# Patient Record
Sex: FEMALE | Race: WHITE | Employment: OTHER | URBAN - METROPOLITAN AREA
[De-identification: names, ages, dates, MRNs, and addresses within clinical notes are randomized per-mention and may not be internally consistent; named-entity substitution may affect disease eponyms.]

---

## 2019-03-21 ENCOUNTER — OFFICE VISIT (OUTPATIENT)
Dept: VASCULAR SURGERY | Age: 64
End: 2019-03-21
Payer: MEDICARE

## 2019-03-21 VITALS — HEART RATE: 88 BPM | SYSTOLIC BLOOD PRESSURE: 122 MMHG | DIASTOLIC BLOOD PRESSURE: 82 MMHG

## 2019-03-21 DIAGNOSIS — R09.89 DECREASED DORSALIS PEDIS PULSE: ICD-10-CM

## 2019-03-21 DIAGNOSIS — M79.89 LEG SWELLING: Primary | ICD-10-CM

## 2019-03-21 DIAGNOSIS — L81.9 DISCOLORATION OF SKIN OF LOWER LEG: ICD-10-CM

## 2019-03-21 DIAGNOSIS — F17.200 TOBACCO DEPENDENCE: ICD-10-CM

## 2019-03-21 DIAGNOSIS — I87.2 CHRONIC VENOUS INSUFFICIENCY: ICD-10-CM

## 2019-03-21 PROCEDURE — 3017F COLORECTAL CA SCREEN DOC REV: CPT | Performed by: SURGERY

## 2019-03-21 PROCEDURE — G8421 BMI NOT CALCULATED: HCPCS | Performed by: SURGERY

## 2019-03-21 PROCEDURE — 99204 OFFICE O/P NEW MOD 45 MIN: CPT | Performed by: SURGERY

## 2019-03-21 PROCEDURE — 4004F PT TOBACCO SCREEN RCVD TLK: CPT | Performed by: SURGERY

## 2019-03-21 PROCEDURE — G8427 DOCREV CUR MEDS BY ELIG CLIN: HCPCS | Performed by: SURGERY

## 2019-03-21 PROCEDURE — G8484 FLU IMMUNIZE NO ADMIN: HCPCS | Performed by: SURGERY

## 2019-03-26 ENCOUNTER — TELEPHONE (OUTPATIENT)
Dept: VASCULAR SURGERY | Age: 64
End: 2019-03-26

## 2019-07-10 ENCOUNTER — HOSPITAL ENCOUNTER (OUTPATIENT)
Dept: GENERAL RADIOLOGY | Age: 64
Discharge: HOME | End: 2019-07-12
Payer: MEDICARE

## 2019-07-10 ENCOUNTER — HOSPITAL ENCOUNTER (OUTPATIENT)
Age: 64
Discharge: HOME | End: 2019-07-12
Payer: MEDICARE

## 2019-07-10 DIAGNOSIS — M25.552 HIP PAIN, CHRONIC, LEFT: ICD-10-CM

## 2019-07-10 DIAGNOSIS — G89.29 HIP PAIN, CHRONIC, LEFT: ICD-10-CM

## 2019-07-10 PROCEDURE — 73502 X-RAY EXAM HIP UNI 2-3 VIEWS: CPT

## 2021-05-27 ENCOUNTER — TELEPHONE (OUTPATIENT)
Dept: CASE MANAGEMENT | Age: 66
End: 2021-05-27

## 2021-05-27 NOTE — TELEPHONE ENCOUNTER
I called the patient and I was unable to leave a message to remind her of the CT lung screening at [facility] on 5/28/2021 at 8:00 am with an 7:30 am arrival.  If unable to keep this appt call the office at [phone] to get rescheduled.           Electronically signed by Santos Starr on 5/27/21 at 11:00 AM EDT

## 2021-05-28 ENCOUNTER — HOSPITAL ENCOUNTER (OUTPATIENT)
Dept: CT IMAGING | Age: 66
Discharge: HOME | End: 2021-05-30
Payer: MEDICARE

## 2021-05-28 ENCOUNTER — HOSPITAL ENCOUNTER (OUTPATIENT)
Age: 66
Discharge: HOME | End: 2021-05-28
Payer: MEDICARE

## 2021-05-28 ENCOUNTER — HOSPITAL ENCOUNTER (OUTPATIENT)
Dept: MAMMOGRAPHY | Age: 66
Discharge: HOME | End: 2021-05-30
Payer: MEDICARE

## 2021-05-28 DIAGNOSIS — Z11.4 ENCOUNTER FOR SCREENING FOR HIV: ICD-10-CM

## 2021-05-28 DIAGNOSIS — E78.5 ELEVATED LIPIDS: ICD-10-CM

## 2021-05-28 DIAGNOSIS — G60.3 IDIOPATHIC PROGRESSIVE NEUROPATHY: ICD-10-CM

## 2021-05-28 DIAGNOSIS — R73.9 ELEVATED BLOOD SUGAR: ICD-10-CM

## 2021-05-28 DIAGNOSIS — Z11.59 ENCOUNTER FOR HEPATITIS C SCREENING TEST FOR LOW RISK PATIENT: ICD-10-CM

## 2021-05-28 DIAGNOSIS — E44.0 MODERATE PROTEIN-CALORIE MALNUTRITION (HCC): ICD-10-CM

## 2021-05-28 DIAGNOSIS — F17.200 TOBACCO DEPENDENCE: ICD-10-CM

## 2021-05-28 DIAGNOSIS — Z12.31 ENCOUNTER FOR SCREENING MAMMOGRAM FOR MALIGNANT NEOPLASM OF BREAST: ICD-10-CM

## 2021-05-28 LAB
ALBUMIN SERPL-MCNC: 4.6 G/DL (ref 3.5–5.2)
ALP BLD-CCNC: 47 U/L (ref 35–104)
ALT SERPL-CCNC: 8 U/L (ref 0–32)
ANION GAP SERPL CALCULATED.3IONS-SCNC: 10 MMOL/L (ref 7–16)
AST SERPL-CCNC: 10 U/L (ref 0–31)
BILIRUB SERPL-MCNC: 0.3 MG/DL (ref 0–1.2)
BUN BLDV-MCNC: 9 MG/DL (ref 6–23)
CALCIUM SERPL-MCNC: 9.6 MG/DL (ref 8.6–10.2)
CHLORIDE BLD-SCNC: 100 MMOL/L (ref 98–107)
CHOLESTEROL, TOTAL: 216 MG/DL (ref 0–199)
CO2: 29 MMOL/L (ref 22–29)
CREAT SERPL-MCNC: 0.6 MG/DL (ref 0.5–1)
FOLATE: 6.3 NG/ML (ref 4.8–24.2)
GFR AFRICAN AMERICAN: >60
GFR NON-AFRICAN AMERICAN: >60 ML/MIN/1.73
GLUCOSE BLD-MCNC: 94 MG/DL (ref 74–99)
HBA1C MFR BLD: 6.5 % (ref 4–5.6)
HCT VFR BLD CALC: 52.3 % (ref 34–48)
HDLC SERPL-MCNC: 36 MG/DL
HEMOGLOBIN: 17.4 G/DL (ref 11.5–15.5)
LDL CHOLESTEROL CALCULATED: 148 MG/DL (ref 0–99)
MCH RBC QN AUTO: 30.2 PG (ref 26–35)
MCHC RBC AUTO-ENTMCNC: 33.3 % (ref 32–34.5)
MCV RBC AUTO: 90.8 FL (ref 80–99.9)
PDW BLD-RTO: 14.5 FL (ref 11.5–15)
PLATELET # BLD: 290 E9/L (ref 130–450)
PMV BLD AUTO: 9.9 FL (ref 7–12)
POTASSIUM SERPL-SCNC: 3.7 MMOL/L (ref 3.5–5)
RBC # BLD: 5.76 E12/L (ref 3.5–5.5)
SODIUM BLD-SCNC: 139 MMOL/L (ref 132–146)
TRIGL SERPL-MCNC: 160 MG/DL (ref 0–149)
TSH SERPL DL<=0.05 MIU/L-ACNC: 1.64 UIU/ML (ref 0.27–4.2)
VITAMIN B-12: 540 PG/ML (ref 211–946)
VLDLC SERPL CALC-MCNC: 32 MG/DL
WBC # BLD: 9.4 E9/L (ref 4.5–11.5)

## 2021-05-28 PROCEDURE — 80053 COMPREHEN METABOLIC PANEL: CPT

## 2021-05-28 PROCEDURE — 87522 HEPATITIS C REVRS TRNSCRPJ: CPT

## 2021-05-28 PROCEDURE — 86703 HIV-1/HIV-2 1 RESULT ANTBDY: CPT

## 2021-05-28 PROCEDURE — 85027 COMPLETE CBC AUTOMATED: CPT

## 2021-05-28 PROCEDURE — 71271 CT THORAX LUNG CANCER SCR C-: CPT

## 2021-05-28 PROCEDURE — 84165 PROTEIN E-PHORESIS SERUM: CPT

## 2021-05-28 PROCEDURE — 83036 HEMOGLOBIN GLYCOSYLATED A1C: CPT

## 2021-05-28 PROCEDURE — 80061 LIPID PANEL: CPT

## 2021-05-28 PROCEDURE — 86803 HEPATITIS C AB TEST: CPT

## 2021-05-28 PROCEDURE — 82607 VITAMIN B-12: CPT

## 2021-05-28 PROCEDURE — 84443 ASSAY THYROID STIM HORMONE: CPT

## 2021-05-28 PROCEDURE — 77067 SCR MAMMO BI INCL CAD: CPT

## 2021-05-28 PROCEDURE — 36415 COLL VENOUS BLD VENIPUNCTURE: CPT

## 2021-05-28 PROCEDURE — 82746 ASSAY OF FOLIC ACID SERUM: CPT

## 2021-06-01 ENCOUNTER — TELEPHONE (OUTPATIENT)
Dept: CASE MANAGEMENT | Age: 66
End: 2021-06-01

## 2021-06-01 LAB
ALBUMIN SERPL-MCNC: 3.6 G/DL (ref 3.5–4.7)
ALPHA-1-GLOBULIN: 0.3 G/DL (ref 0.2–0.4)
ALPHA-2-GLOBULIN: 1 G/FL (ref 0.5–1)
BETA GLOBULIN: 1.1 G/DL (ref 0.8–1.3)
ELECTROPHORESIS: NORMAL
GAMMA GLOBULIN: 1.4 G/DL (ref 0.7–1.6)
HEPATITIS C ANTIBODY INTERPRETATION: NORMAL
HIV-1 AND HIV-2 ANTIBODIES: NORMAL
TOTAL PROTEIN: 7.4 G/DL (ref 6.4–8.3)

## 2021-06-01 NOTE — TELEPHONE ENCOUNTER
No call, encounter opened to process CT Lung Screening. CT Lung Screen: 5/28/2021    Impression   Multiple 2-4 mm bilateral pulmonary nodules predominantly in a subpleural   distribution.       Mosaic attenuation pattern throughout the mid and upper lung zones, a   nonspecific finding generally seen with chronic small vessel or small airways   disease.       2.9 cm fluid density collection in the anterior mediastinum, adjacent to the   upper margin of the pericardial recess.  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  No obvious solid components are appreciated.       Multiple top-normal mediastinal lymph nodes, of uncertain etiology or   clinical significance.       LUNG RADS:   Per ACR Lung-RADS Version 1.1       Category 2S, Benign appearance or behavior.       Management:  Continue annual lung screening with LDCT in 12 months.       Findings:  Modifier-may add on to category 0-4 coding.       Management:  As appropriate to specific finding.       RECOMMENDATIONS:   If you would like to register your patient with the [email or web address], please contact the Nurse Navigator at   [phone]. Pack years: 13    Social History     Tobacco Use  Smoking Status: Current Every Day Smoker    Start Date:    Quit Date:    Types: Cigarettes   Packs/Day: 0.5   Years: 30   Pack Years: 15   Smokeless Tobacco: Never used         Results letter sent to patient via my chart or mailed.      [facility]

## 2021-06-02 LAB
HCV QNT BY NAAT IU/ML: NOT DETECTED IU/ML
HCV QNT BY NAAT LOG IU/ML: NOT DETECTED LOG IU/ML
INTERPRETATION: NOT DETECTED

## 2021-06-07 ENCOUNTER — TELEPHONE (OUTPATIENT)
Dept: GENERAL RADIOLOGY | Age: 66
End: 2021-06-07

## 2021-06-07 NOTE — TELEPHONE ENCOUNTER
Call to patient in reference to her mammogram performed at [facility] on May 28, 2021. Instructed patient that the radiologist has recommended some additional breast imaging, in order to make a final determination/result. Verbalizes understanding and is agreeable to proceed. Patient wanted scheduled in 2 weeks as she is in the process of moving. Patient scheduled 6-21.       Em Blackman RN, BSN, [address]

## 2021-07-19 ENCOUNTER — TELEPHONE (OUTPATIENT)
Dept: GENERAL RADIOLOGY | Age: 66
End: 2021-07-19

## 2021-07-19 NOTE — TELEPHONE ENCOUNTER
Spoke with patient about rescheduling breast imaging appointment. Appointment made for 8-9-21.   Patient states she isn't available until the second week of August.

## 2021-10-05 ENCOUNTER — HOSPITAL ENCOUNTER (OUTPATIENT)
Dept: GENERAL RADIOLOGY | Age: 66
Discharge: HOME | End: 2021-10-07
Payer: MEDICARE

## 2021-10-05 ENCOUNTER — HOSPITAL ENCOUNTER (OUTPATIENT)
Dept: GENERAL RADIOLOGY | Age: 66
End: 2021-10-05
Payer: MEDICARE

## 2021-10-05 DIAGNOSIS — R92.8 ABNORMAL MAMMOGRAM: ICD-10-CM

## 2021-10-05 PROCEDURE — G0279 TOMOSYNTHESIS, MAMMO: HCPCS

## 2021-10-05 PROCEDURE — 76642 ULTRASOUND BREAST LIMITED: CPT

## 2021-10-05 PROCEDURE — 77065 DX MAMMO INCL CAD UNI: CPT

## 2022-03-01 ENCOUNTER — HOSPITAL ENCOUNTER (OUTPATIENT)
Dept: NEUROLOGY | Age: 67
Discharge: HOME | End: 2022-03-01
Payer: MEDICARE

## 2022-03-01 VITALS — BODY MASS INDEX: 18.42 KG/M2 | HEIGHT: 72 IN | WEIGHT: 136 LBS

## 2022-03-01 PROCEDURE — 95886 MUSC TEST DONE W/N TEST COMP: CPT

## 2022-03-01 PROCEDURE — 95911 NRV CNDJ TEST 9-10 STUDIES: CPT

## 2022-03-01 NOTE — PROCEDURES
[address] [facility]  [address] [facility], [address] [facility]  Phone: [phone]  Fax: [phone]      Referring Provider: FREDDY Hall CNP  Primary Care Physician: Amarilis Alvarez DO  Patient Name: Christi Onofre  Patient YOB: 1955  Gender: female  BMI: Body mass index is 18.44 kg/m². Height 6' (1.829 m), weight 136 lb (61.7 kg). 3/1/2022    Reason for referral: EMG    Description of clinical problem:   No chief complaint on file. Pain Yes   ; Numbness/tingling  Yes; Weakness  Yes       Brief physical exam:   Sensory deficit Yes; Weakness Yes; Atrophy  Yes; Reflex abnormality Yes      Study Limitations:  none    Summary of Findings:   Nerve conduction studies:   · The following nerve conduction studies were abnormal:   · Reduced peroneal motor amplitudes and absent sural/superficial sensory responses bilaterally. · All other nerve conduction studies listed in the table above were normal in latency, amplitude and conduction velocity. Lauren  22.  Electrodiagnostic Laboratory  [place]        Full Name: Harry Neal Gender: Female  MRN: [id number] YOB: 1955  Location[de-identified] Outpt. Visit Date: 3/1/2022 10:45  Age: 73 Years 11 Months Old  Examining Physician: Dr. Trav Garcia  Referring Physician: Steve Brenner. Esdras Caicedo NP  Technician: Yolanda Morocho   Height: 6 feet 0 inch  Weight: 136 lbs  Notes: Neuropathy G90.09      Motor NCS      Nerve / Sites Lat. Amplitude Amp. 1-2 Distance Lat Diff Velocity Temp.    ms mV % cm ms m/s °C   L Peroneal - EDB      Ankle 4.32 1.4 100 8   33.6      Pop fossa 15.10 0.9 63.3 39 10.78 36 33.8   R Peroneal - EDB      Ankle 5.21 2.7 100 8   33.5      Pop fossa 15.68 2.6 97.8 39 10.47 37 33.4   R Tibial - AH      Ankle 3.96 13.6 100 8   32      Pop fossa 15.99 9.1 67.3 43 12.03 36 32.6   L Tibial - AH      Ankle 4.69 6.8 100 8   32.5      Pop fossa 16.98 3.6 52.7 43 12.29 35 32.5 Sensory NCS      Nerve / Sites Onset Lat Peak Lat PP Amp Distance Velocity Temp.    ms ms µV cm m/s °C   L Superficial peroneal - Ankle      Lat leg NR NR NR 10 NR 33.6   R Superficial peroneal - Ankle      Lat leg NR NR NR 10 NR 32.7   R Sural - Ankle (Calf)      Calf NR NR NR 14 NR 32.9   L Sural - Ankle (Calf)      Calf NR NR NR 14 NR 32.6       F  Wave      Nerve F Lat M Lat F-M Lat    ms ms ms   L Peroneal - EDB 60.6 5.2 55.4   R Peroneal - EDB 61.4 6.6 54.8   R Tibial - AH 64.9 1.7 63.2   L Tibial - AH 66.6 5.6 61.0       H Reflex      Nerve Lat Hmax    ms   R Tibial - [place]us 34.6   L Tibial - [place]ole[place] 36.6       EMG         EMG Summary Table     Spontaneous MUAP Recruitment   Muscle IA Fib PSW Fasc H.F. Amp Dur. PPP Pattern   L. Extensor digitorum brevis N None 1+ None None N N N N   L. Gastrocnemius (Lateral head) N None None None None N N N N   L. Gastrocnemius (Medial head) N None None None None N N N N   L. Tibialis anterior N None None None None N N N N   L. Vastus lateralis N None None None None N N N N                 ·     Needle EMG:   · Needle EMG was performed using a monopolar needle. · The following abnormalities were seen on needle EMG: none   All other muscles tested, as listed in the table above demonstrated normal amplitude, duration, phases and recruitment and no active denervation signs were seen. 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 Clinical correlation advised. Technologist: Dana Thompson MD    Nerve conduction studies and electromyography were performed according to our laboratory policies and procedures which can be provided upon request. All abnormal values are identified in the table.  Laboratory normal values can also be provided upon request.       Cc: Josh Miller, APRN - CNP  Natalie Rodriguez DO

## 2022-08-03 ENCOUNTER — HOSPITAL ENCOUNTER (INPATIENT)
Age: 67
LOS: 6 days | Discharge: HOME HEALTH | DRG: 504 | End: 2022-08-10
Attending: EMERGENCY MEDICINE | Admitting: FAMILY MEDICINE
Payer: MEDICARE

## 2022-08-03 ENCOUNTER — APPOINTMENT (OUTPATIENT)
Dept: GENERAL RADIOLOGY | Age: 67
DRG: 504 | End: 2022-08-03
Payer: MEDICARE

## 2022-08-03 DIAGNOSIS — M86.172 ACUTE OSTEOMYELITIS OF TOE OF LEFT FOOT (HCC): Primary | ICD-10-CM

## 2022-08-03 DIAGNOSIS — L03.032 CELLULITIS OF LEFT TOE: ICD-10-CM

## 2022-08-03 DIAGNOSIS — M86.9 OSTEOMYELITIS OF LEFT FOOT, UNSPECIFIED TYPE (HCC): ICD-10-CM

## 2022-08-03 LAB
ALBUMIN SERPL-MCNC: 4.1 G/DL (ref 3.5–5.2)
ALP BLD-CCNC: 46 U/L (ref 35–104)
ALT SERPL-CCNC: 7 U/L (ref 0–32)
ANION GAP SERPL CALCULATED.3IONS-SCNC: 14 MMOL/L (ref 7–16)
AST SERPL-CCNC: 11 U/L (ref 0–31)
BASOPHILS ABSOLUTE: 0.06 E9/L (ref 0–0.2)
BASOPHILS RELATIVE PERCENT: 0.6 % (ref 0–2)
BILIRUB SERPL-MCNC: 0.3 MG/DL (ref 0–1.2)
BUN BLDV-MCNC: 9 MG/DL (ref 6–23)
CALCIUM SERPL-MCNC: 9.8 MG/DL (ref 8.6–10.2)
CHLORIDE BLD-SCNC: 98 MMOL/L (ref 98–107)
CO2: 27 MMOL/L (ref 22–29)
CREAT SERPL-MCNC: 0.6 MG/DL (ref 0.5–1)
EOSINOPHILS ABSOLUTE: 0.12 E9/L (ref 0.05–0.5)
EOSINOPHILS RELATIVE PERCENT: 1.1 % (ref 0–6)
GFR AFRICAN AMERICAN: >60
GFR NON-AFRICAN AMERICAN: >60 ML/MIN/1.73
GLUCOSE BLD-MCNC: 105 MG/DL (ref 74–99)
HCT VFR BLD CALC: 47.8 % (ref 34–48)
HEMOGLOBIN: 16.7 G/DL (ref 11.5–15.5)
IMMATURE GRANULOCYTES #: 0.02 E9/L
IMMATURE GRANULOCYTES %: 0.2 % (ref 0–5)
LACTIC ACID: 1.1 MMOL/L (ref 0.5–2.2)
LYMPHOCYTES ABSOLUTE: 3.12 E9/L (ref 1.5–4)
LYMPHOCYTES RELATIVE PERCENT: 28.9 % (ref 20–42)
MCH RBC QN AUTO: 31.2 PG (ref 26–35)
MCHC RBC AUTO-ENTMCNC: 34.9 % (ref 32–34.5)
MCV RBC AUTO: 89.2 FL (ref 80–99.9)
MONOCYTES ABSOLUTE: 0.66 E9/L (ref 0.1–0.95)
MONOCYTES RELATIVE PERCENT: 6.1 % (ref 2–12)
NEUTROPHILS ABSOLUTE: 6.8 E9/L (ref 1.8–7.3)
NEUTROPHILS RELATIVE PERCENT: 63.1 % (ref 43–80)
PDW BLD-RTO: 13 FL (ref 11.5–15)
PLATELET # BLD: 288 E9/L (ref 130–450)
PMV BLD AUTO: 9.9 FL (ref 7–12)
POTASSIUM SERPL-SCNC: 3.4 MMOL/L (ref 3.5–5)
RBC # BLD: 5.36 E12/L (ref 3.5–5.5)
SODIUM BLD-SCNC: 139 MMOL/L (ref 132–146)
TOTAL PROTEIN: 8.3 G/DL (ref 6.4–8.3)
WBC # BLD: 10.8 E9/L (ref 4.5–11.5)

## 2022-08-03 PROCEDURE — 87070 CULTURE OTHR SPECIMN AEROBIC: CPT

## 2022-08-03 PROCEDURE — 99285 EMERGENCY DEPT VISIT HI MDM: CPT

## 2022-08-03 PROCEDURE — 96366 THER/PROPH/DIAG IV INF ADDON: CPT

## 2022-08-03 PROCEDURE — 96375 TX/PRO/DX INJ NEW DRUG ADDON: CPT

## 2022-08-03 PROCEDURE — 6360000002 HC RX W HCPCS: Performed by: EMERGENCY MEDICINE

## 2022-08-03 PROCEDURE — 87186 SC STD MICRODIL/AGAR DIL: CPT

## 2022-08-03 PROCEDURE — 96365 THER/PROPH/DIAG IV INF INIT: CPT

## 2022-08-03 PROCEDURE — 6370000000 HC RX 637 (ALT 250 FOR IP): Performed by: EMERGENCY MEDICINE

## 2022-08-03 PROCEDURE — 73660 X-RAY EXAM OF TOE(S): CPT

## 2022-08-03 PROCEDURE — 36415 COLL VENOUS BLD VENIPUNCTURE: CPT

## 2022-08-03 PROCEDURE — 2580000003 HC RX 258: Performed by: EMERGENCY MEDICINE

## 2022-08-03 PROCEDURE — 85025 COMPLETE CBC W/AUTO DIFF WBC: CPT

## 2022-08-03 PROCEDURE — 87077 CULTURE AEROBIC IDENTIFY: CPT

## 2022-08-03 PROCEDURE — 87040 BLOOD CULTURE FOR BACTERIA: CPT

## 2022-08-03 PROCEDURE — 6370000000 HC RX 637 (ALT 250 FOR IP)

## 2022-08-03 PROCEDURE — 96367 TX/PROPH/DG ADDL SEQ IV INF: CPT

## 2022-08-03 PROCEDURE — 80053 COMPREHEN METABOLIC PANEL: CPT

## 2022-08-03 PROCEDURE — 83605 ASSAY OF LACTIC ACID: CPT

## 2022-08-03 RX ORDER — HYDROCODONE BITARTRATE AND ACETAMINOPHEN 5; 325 MG/1; MG/1
1 TABLET ORAL ONCE
Status: COMPLETED | OUTPATIENT
Start: 2022-08-03 | End: 2022-08-03

## 2022-08-03 RX ORDER — FENTANYL CITRATE 50 UG/ML
50 INJECTION, SOLUTION INTRAMUSCULAR; INTRAVENOUS ONCE
Status: COMPLETED | OUTPATIENT
Start: 2022-08-03 | End: 2022-08-03

## 2022-08-03 RX ORDER — 0.9 % SODIUM CHLORIDE 0.9 %
500 INTRAVENOUS SOLUTION INTRAVENOUS ONCE
Status: COMPLETED | OUTPATIENT
Start: 2022-08-03 | End: 2022-08-03

## 2022-08-03 RX ORDER — DIAPER,BRIEF,INFANT-TODD,DISP
EACH MISCELLANEOUS
Status: COMPLETED
Start: 2022-08-03 | End: 2022-08-03

## 2022-08-03 RX ADMIN — SODIUM CHLORIDE 500 ML: 9 INJECTION, SOLUTION INTRAVENOUS at 19:07

## 2022-08-03 RX ADMIN — VANCOMYCIN HYDROCHLORIDE 1000 MG: 1 INJECTION, POWDER, LYOPHILIZED, FOR SOLUTION INTRAVENOUS at 22:26

## 2022-08-03 RX ADMIN — FENTANYL CITRATE 50 MCG: 50 INJECTION, SOLUTION INTRAMUSCULAR; INTRAVENOUS at 23:04

## 2022-08-03 RX ADMIN — PIPERACILLIN AND TAZOBACTAM 3375 MG: 3; .375 INJECTION, POWDER, FOR SOLUTION INTRAVENOUS at 20:19

## 2022-08-03 RX ADMIN — HYDROCODONE BITARTRATE AND ACETAMINOPHEN 1 TABLET: 5; 325 TABLET ORAL at 18:49

## 2022-08-03 RX ADMIN — BACITRACIN ZINC 1 G: 500 OINTMENT TOPICAL at 22:34

## 2022-08-03 ASSESSMENT — PAIN SCALES - GENERAL
PAINLEVEL_OUTOF10: 8
PAINLEVEL_OUTOF10: 7

## 2022-08-03 ASSESSMENT — PAIN - FUNCTIONAL ASSESSMENT: PAIN_FUNCTIONAL_ASSESSMENT: NONE - DENIES PAIN
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
## 2022-08-03 NOTE — ED NOTES
Pt has skin ulcer the size of quarter and reddend swollen areas right foot and on top and bottom of area     Joyce Rodriguez RN  08/03/22 1925

## 2022-08-04 ENCOUNTER — APPOINTMENT (OUTPATIENT)
Dept: INTERVENTIONAL RADIOLOGY/VASCULAR | Age: 67
DRG: 504 | End: 2022-08-04
Payer: MEDICARE

## 2022-08-04 ENCOUNTER — ANESTHESIA EVENT (OUTPATIENT)
Dept: OPERATING ROOM | Age: 67
DRG: 504 | End: 2022-08-04
Payer: MEDICARE

## 2022-08-04 ENCOUNTER — APPOINTMENT (OUTPATIENT)
Dept: ULTRASOUND IMAGING | Age: 67
DRG: 504 | End: 2022-08-04
Payer: MEDICARE

## 2022-08-04 PROBLEM — M86.9 OSTEOMYELITIS (HCC): Status: ACTIVE | Noted: 2022-08-04

## 2022-08-04 LAB
ALBUMIN SERPL-MCNC: 3.6 G/DL (ref 3.5–5.2)
ALP BLD-CCNC: 42 U/L (ref 35–104)
ALT SERPL-CCNC: 9 U/L (ref 0–32)
ANION GAP SERPL CALCULATED.3IONS-SCNC: 9 MMOL/L (ref 7–16)
AST SERPL-CCNC: 11 U/L (ref 0–31)
BASOPHILS ABSOLUTE: 0.06 E9/L (ref 0–0.2)
BASOPHILS RELATIVE PERCENT: 0.6 % (ref 0–2)
BILIRUB SERPL-MCNC: 0.3 MG/DL (ref 0–1.2)
BUN BLDV-MCNC: 7 MG/DL (ref 6–23)
CALCIUM SERPL-MCNC: 9.3 MG/DL (ref 8.6–10.2)
CHLORIDE BLD-SCNC: 103 MMOL/L (ref 98–107)
CHOLESTEROL, TOTAL: 197 MG/DL (ref 0–199)
CO2: 27 MMOL/L (ref 22–29)
CREAT SERPL-MCNC: 0.5 MG/DL (ref 0.5–1)
EKG ATRIAL RATE: 73 BPM
EKG P AXIS: 83 DEGREES
EKG P-R INTERVAL: 162 MS
EKG Q-T INTERVAL: 420 MS
EKG QRS DURATION: 92 MS
EKG QTC CALCULATION (BAZETT): 462 MS
EKG R AXIS: 77 DEGREES
EKG T AXIS: 41 DEGREES
EKG VENTRICULAR RATE: 73 BPM
EOSINOPHILS ABSOLUTE: 0.08 E9/L (ref 0.05–0.5)
EOSINOPHILS RELATIVE PERCENT: 0.8 % (ref 0–6)
GFR AFRICAN AMERICAN: >60
GFR NON-AFRICAN AMERICAN: >60 ML/MIN/1.73
GLUCOSE BLD-MCNC: 112 MG/DL (ref 74–99)
HBA1C MFR BLD: 5.9 % (ref 4–5.6)
HCT VFR BLD CALC: 46.1 % (ref 34–48)
HDLC SERPL-MCNC: 33 MG/DL
HEMOGLOBIN: 15.5 G/DL (ref 11.5–15.5)
IMMATURE GRANULOCYTES #: 0.04 E9/L
IMMATURE GRANULOCYTES %: 0.4 % (ref 0–5)
LACTIC ACID: 0.9 MMOL/L (ref 0.5–2.2)
LDL CHOLESTEROL CALCULATED: 141 MG/DL (ref 0–99)
LYMPHOCYTES ABSOLUTE: 2.16 E9/L (ref 1.5–4)
LYMPHOCYTES RELATIVE PERCENT: 21.1 % (ref 20–42)
MCH RBC QN AUTO: 30.2 PG (ref 26–35)
MCHC RBC AUTO-ENTMCNC: 33.6 % (ref 32–34.5)
MCV RBC AUTO: 89.7 FL (ref 80–99.9)
MONOCYTES ABSOLUTE: 0.56 E9/L (ref 0.1–0.95)
MONOCYTES RELATIVE PERCENT: 5.5 % (ref 2–12)
NEUTROPHILS ABSOLUTE: 7.35 E9/L (ref 1.8–7.3)
NEUTROPHILS RELATIVE PERCENT: 71.6 % (ref 43–80)
PDW BLD-RTO: 12.9 FL (ref 11.5–15)
PLATELET # BLD: 280 E9/L (ref 130–450)
PMV BLD AUTO: 10.3 FL (ref 7–12)
POTASSIUM REFLEX MAGNESIUM: 3.7 MMOL/L (ref 3.5–5)
RBC # BLD: 5.14 E12/L (ref 3.5–5.5)
SODIUM BLD-SCNC: 139 MMOL/L (ref 132–146)
T4 FREE: 1.11 NG/DL (ref 0.93–1.7)
TOTAL PROTEIN: 7.1 G/DL (ref 6.4–8.3)
TRIGL SERPL-MCNC: 114 MG/DL (ref 0–149)
VLDLC SERPL CALC-MCNC: 23 MG/DL
WBC # BLD: 10.3 E9/L (ref 4.5–11.5)

## 2022-08-04 PROCEDURE — 83605 ASSAY OF LACTIC ACID: CPT

## 2022-08-04 PROCEDURE — 84439 ASSAY OF FREE THYROXINE: CPT

## 2022-08-04 PROCEDURE — 80061 LIPID PANEL: CPT

## 2022-08-04 PROCEDURE — 6360000002 HC RX W HCPCS: Performed by: INTERNAL MEDICINE

## 2022-08-04 PROCEDURE — 2580000003 HC RX 258

## 2022-08-04 PROCEDURE — 80053 COMPREHEN METABOLIC PANEL: CPT

## 2022-08-04 PROCEDURE — 1200000000 HC SEMI PRIVATE

## 2022-08-04 PROCEDURE — 85025 COMPLETE CBC W/AUTO DIFF WBC: CPT

## 2022-08-04 PROCEDURE — 2580000003 HC RX 258: Performed by: INTERNAL MEDICINE

## 2022-08-04 PROCEDURE — 83036 HEMOGLOBIN GLYCOSYLATED A1C: CPT

## 2022-08-04 PROCEDURE — 93923 UPR/LXTR ART STDY 3+ LVLS: CPT

## 2022-08-04 PROCEDURE — 6370000000 HC RX 637 (ALT 250 FOR IP)

## 2022-08-04 PROCEDURE — 93971 EXTREMITY STUDY: CPT | Performed by: RADIOLOGY

## 2022-08-04 PROCEDURE — 93970 EXTREMITY STUDY: CPT

## 2022-08-04 PROCEDURE — 93005 ELECTROCARDIOGRAM TRACING: CPT

## 2022-08-04 PROCEDURE — 36415 COLL VENOUS BLD VENIPUNCTURE: CPT

## 2022-08-04 RX ORDER — ACETAMINOPHEN 325 MG/1
650 TABLET ORAL EVERY 6 HOURS PRN
Status: DISCONTINUED | OUTPATIENT
Start: 2022-08-04 | End: 2022-08-10 | Stop reason: HOSPADM

## 2022-08-04 RX ORDER — POLYETHYLENE GLYCOL 3350 17 G/17G
17 POWDER, FOR SOLUTION ORAL DAILY PRN
Status: DISCONTINUED | OUTPATIENT
Start: 2022-08-04 | End: 2022-08-10 | Stop reason: HOSPADM

## 2022-08-04 RX ORDER — IBUPROFEN 200 MG
800 TABLET ORAL EVERY 8 HOURS PRN
COMMUNITY
End: 2022-08-19

## 2022-08-04 RX ORDER — ONDANSETRON 4 MG/1
4 TABLET, ORALLY DISINTEGRATING ORAL EVERY 8 HOURS PRN
Status: DISCONTINUED | OUTPATIENT
Start: 2022-08-04 | End: 2022-08-10 | Stop reason: HOSPADM

## 2022-08-04 RX ORDER — SODIUM CHLORIDE 0.9 % (FLUSH) 0.9 %
5-40 SYRINGE (ML) INJECTION PRN
Status: DISCONTINUED | OUTPATIENT
Start: 2022-08-04 | End: 2022-08-06 | Stop reason: SDUPTHER

## 2022-08-04 RX ORDER — LANOLIN ALCOHOL/MO/W.PET/CERES
3 CREAM (GRAM) TOPICAL NIGHTLY PRN
Status: DISCONTINUED | OUTPATIENT
Start: 2022-08-04 | End: 2022-08-10 | Stop reason: HOSPADM

## 2022-08-04 RX ORDER — SODIUM CHLORIDE 9 MG/ML
INJECTION, SOLUTION INTRAVENOUS PRN
Status: DISCONTINUED | OUTPATIENT
Start: 2022-08-04 | End: 2022-08-06 | Stop reason: SDUPTHER

## 2022-08-04 RX ORDER — ONDANSETRON 2 MG/ML
4 INJECTION INTRAMUSCULAR; INTRAVENOUS EVERY 6 HOURS PRN
Status: DISCONTINUED | OUTPATIENT
Start: 2022-08-04 | End: 2022-08-10 | Stop reason: HOSPADM

## 2022-08-04 RX ORDER — ENOXAPARIN SODIUM 100 MG/ML
40 INJECTION SUBCUTANEOUS DAILY
Status: DISCONTINUED | OUTPATIENT
Start: 2022-08-04 | End: 2022-08-10 | Stop reason: HOSPADM

## 2022-08-04 RX ORDER — SODIUM CHLORIDE 0.9 % (FLUSH) 0.9 %
5-40 SYRINGE (ML) INJECTION EVERY 12 HOURS SCHEDULED
Status: DISCONTINUED | OUTPATIENT
Start: 2022-08-04 | End: 2022-08-06 | Stop reason: SDUPTHER

## 2022-08-04 RX ORDER — ACETAMINOPHEN 650 MG/1
650 SUPPOSITORY RECTAL EVERY 6 HOURS PRN
Status: DISCONTINUED | OUTPATIENT
Start: 2022-08-04 | End: 2022-08-10 | Stop reason: HOSPADM

## 2022-08-04 RX ORDER — OXYCODONE HYDROCHLORIDE AND ACETAMINOPHEN 5; 325 MG/1; MG/1
1 TABLET ORAL EVERY 4 HOURS PRN
Status: DISCONTINUED | OUTPATIENT
Start: 2022-08-04 | End: 2022-08-10 | Stop reason: HOSPADM

## 2022-08-04 RX ADMIN — OXYCODONE AND ACETAMINOPHEN 1 TABLET: 5; 325 TABLET ORAL at 19:34

## 2022-08-04 RX ADMIN — OXYCODONE AND ACETAMINOPHEN 1 TABLET: 5; 325 TABLET ORAL at 15:29

## 2022-08-04 RX ADMIN — SODIUM CHLORIDE, PRESERVATIVE FREE 10 ML: 5 INJECTION INTRAVENOUS at 09:53

## 2022-08-04 RX ADMIN — OXYCODONE AND ACETAMINOPHEN 1 TABLET: 5; 325 TABLET ORAL at 09:50

## 2022-08-04 RX ADMIN — VANCOMYCIN HYDROCHLORIDE 1000 MG: 1 INJECTION, POWDER, LYOPHILIZED, FOR SOLUTION INTRAVENOUS at 13:19

## 2022-08-04 ASSESSMENT — PAIN DESCRIPTION - ONSET
ONSET: ON-GOING
ONSET: ON-GOING

## 2022-08-04 ASSESSMENT — PAIN - FUNCTIONAL ASSESSMENT: PAIN_FUNCTIONAL_ASSESSMENT: ACTIVITIES ARE NOT PREVENTED

## 2022-08-04 ASSESSMENT — PAIN SCALES - GENERAL
PAINLEVEL_OUTOF10: 3
PAINLEVEL_OUTOF10: 8

## 2022-08-04 ASSESSMENT — PAIN DESCRIPTION - DESCRIPTORS
DESCRIPTORS: PRESSURE;SORE;SHARP
DESCRIPTORS: PRESSURE;SHARP;SORE
DESCRIPTORS: ACHING;POUNDING;JABBING

## 2022-08-04 ASSESSMENT — PAIN DESCRIPTION - LOCATION
LOCATION: FOOT
LOCATION: TOE (COMMENT WHICH ONE)
LOCATION: TOE (COMMENT WHICH ONE)

## 2022-08-04 ASSESSMENT — PAIN DESCRIPTION - ORIENTATION
ORIENTATION: LEFT
ORIENTATION: RIGHT
ORIENTATION: RIGHT

## 2022-08-04 ASSESSMENT — PAIN DESCRIPTION - PAIN TYPE
TYPE: ACUTE PAIN
TYPE: ACUTE PAIN

## 2022-08-04 ASSESSMENT — PAIN DESCRIPTION - FREQUENCY
FREQUENCY: CONTINUOUS
FREQUENCY: CONTINUOUS

## 2022-08-04 ASSESSMENT — LIFESTYLE VARIABLES: SMOKING_STATUS: 1
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
## 2022-08-04 NOTE — ED NOTES
Access line called. I spoke with Ameya Garcia. Dr. Arsalan Henry requested a consult with med-surg. TAKika Santiago  08/03/22 2020

## 2022-08-04 NOTE — CARE COORDINATION
8/4/2022social work transition of care planning  Pt was out of room. Assessment completed with pt's great niece-Tracey. Pt is from home alone and had been independent. Pt reportedly does not have a pcp at this time, but does follow with a pain clinic. Pt pharmacy is [facility] in [place]. Pt has no current reported hx of snf or hhc. Explained sw role in transition of care planning. Pt plan is home. Sw will follow for transition of care needs. Sw left [facility] and hhc list with Lily Harvey. The Plan for Transition of Care is related to the following treatment goals: PT/wound care    The Patient and/or patient representative  was provided with a choice of provider and agrees   with the discharge plan. [x] Yes [] No    Freedom of choice list was provided with basic dialogue that supports the patient's individualized plan of care/goals, treatment preferences and shares the quality data associated with the providers.  [x] Yes [] No  Electronically signed by COLLIN Salinas on 8/4/2022 at 2:23 PM
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
## 2022-08-04 NOTE — ED NOTES
Radiology notified of discrepancy in foot XR.   XR states it is right foot XR when it is actually a left foot XR     Galina Xie RN  08/04/22 0467

## 2022-08-04 NOTE — PROGRESS NOTES
80-year-old female admitted overnight for foot wound. IV antibiotics ordered. Podiatry and ID consulted. Discussed case.
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
## 2022-08-05 ENCOUNTER — ANESTHESIA (OUTPATIENT)
Dept: OPERATING ROOM | Age: 67
DRG: 504 | End: 2022-08-05
Payer: MEDICARE

## 2022-08-05 PROBLEM — I73.9 PAD (PERIPHERAL ARTERY DISEASE) (HCC): Status: ACTIVE | Noted: 2022-08-05

## 2022-08-05 PROBLEM — Z72.0 TOBACCO ABUSE: Status: ACTIVE | Noted: 2022-08-05

## 2022-08-05 PROBLEM — E78.5 HLD (HYPERLIPIDEMIA): Status: ACTIVE | Noted: 2022-08-05

## 2022-08-05 LAB
ALBUMIN SERPL-MCNC: 3.4 G/DL (ref 3.5–5.2)
ALP BLD-CCNC: 37 U/L (ref 35–104)
ALT SERPL-CCNC: 8 U/L (ref 0–32)
ANION GAP SERPL CALCULATED.3IONS-SCNC: 11 MMOL/L (ref 7–16)
AST SERPL-CCNC: 11 U/L (ref 0–31)
BASOPHILS ABSOLUTE: 0.06 E9/L (ref 0–0.2)
BASOPHILS RELATIVE PERCENT: 0.8 % (ref 0–2)
BILIRUB SERPL-MCNC: 0.4 MG/DL (ref 0–1.2)
BUN BLDV-MCNC: 6 MG/DL (ref 6–23)
CALCIUM SERPL-MCNC: 9.2 MG/DL (ref 8.6–10.2)
CHLORIDE BLD-SCNC: 103 MMOL/L (ref 98–107)
CO2: 26 MMOL/L (ref 22–29)
CREAT SERPL-MCNC: 0.5 MG/DL (ref 0.5–1)
EOSINOPHILS ABSOLUTE: 0.11 E9/L (ref 0.05–0.5)
EOSINOPHILS RELATIVE PERCENT: 1.4 % (ref 0–6)
GFR AFRICAN AMERICAN: >60
GFR NON-AFRICAN AMERICAN: >60 ML/MIN/1.73
GLUCOSE BLD-MCNC: 101 MG/DL (ref 74–99)
HCT VFR BLD CALC: 43.7 % (ref 34–48)
HEMOGLOBIN: 14.9 G/DL (ref 11.5–15.5)
IMMATURE GRANULOCYTES #: 0.02 E9/L
IMMATURE GRANULOCYTES %: 0.3 % (ref 0–5)
LYMPHOCYTES ABSOLUTE: 2.7 E9/L (ref 1.5–4)
LYMPHOCYTES RELATIVE PERCENT: 35 % (ref 20–42)
MAGNESIUM: 2.2 MG/DL (ref 1.6–2.6)
MCH RBC QN AUTO: 31 PG (ref 26–35)
MCHC RBC AUTO-ENTMCNC: 34.1 % (ref 32–34.5)
MCV RBC AUTO: 91 FL (ref 80–99.9)
MONOCYTES ABSOLUTE: 0.51 E9/L (ref 0.1–0.95)
MONOCYTES RELATIVE PERCENT: 6.6 % (ref 2–12)
NEUTROPHILS ABSOLUTE: 4.32 E9/L (ref 1.8–7.3)
NEUTROPHILS RELATIVE PERCENT: 55.9 % (ref 43–80)
PDW BLD-RTO: 12.7 FL (ref 11.5–15)
PLATELET # BLD: 267 E9/L (ref 130–450)
PMV BLD AUTO: 10.2 FL (ref 7–12)
POTASSIUM REFLEX MAGNESIUM: 3.4 MMOL/L (ref 3.5–5)
RBC # BLD: 4.8 E12/L (ref 3.5–5.5)
SODIUM BLD-SCNC: 140 MMOL/L (ref 132–146)
TOTAL PROTEIN: 6.8 G/DL (ref 6.4–8.3)
TSH SERPL DL<=0.05 MIU/L-ACNC: 2.1 UIU/ML (ref 0.27–4.2)
WBC # BLD: 7.7 E9/L (ref 4.5–11.5)

## 2022-08-05 PROCEDURE — 80053 COMPREHEN METABOLIC PANEL: CPT

## 2022-08-05 PROCEDURE — 84443 ASSAY THYROID STIM HORMONE: CPT

## 2022-08-05 PROCEDURE — 6370000000 HC RX 637 (ALT 250 FOR IP)

## 2022-08-05 PROCEDURE — 36415 COLL VENOUS BLD VENIPUNCTURE: CPT

## 2022-08-05 PROCEDURE — 2580000003 HC RX 258

## 2022-08-05 PROCEDURE — 1200000000 HC SEMI PRIVATE

## 2022-08-05 PROCEDURE — 83735 ASSAY OF MAGNESIUM: CPT

## 2022-08-05 PROCEDURE — 85025 COMPLETE CBC W/AUTO DIFF WBC: CPT

## 2022-08-05 PROCEDURE — 6370000000 HC RX 637 (ALT 250 FOR IP): Performed by: INTERNAL MEDICINE

## 2022-08-05 RX ORDER — ATORVASTATIN CALCIUM 40 MG/1
40 TABLET, FILM COATED ORAL NIGHTLY
Status: DISCONTINUED | OUTPATIENT
Start: 2022-08-05 | End: 2022-08-10 | Stop reason: HOSPADM

## 2022-08-05 RX ADMIN — OXYCODONE AND ACETAMINOPHEN 1 TABLET: 5; 325 TABLET ORAL at 08:48

## 2022-08-05 RX ADMIN — ATORVASTATIN CALCIUM 40 MG: 40 TABLET, FILM COATED ORAL at 22:18

## 2022-08-05 RX ADMIN — OXYCODONE AND ACETAMINOPHEN 1 TABLET: 5; 325 TABLET ORAL at 12:47

## 2022-08-05 RX ADMIN — OXYCODONE AND ACETAMINOPHEN 1 TABLET: 5; 325 TABLET ORAL at 17:43

## 2022-08-05 RX ADMIN — OXYCODONE AND ACETAMINOPHEN 1 TABLET: 5; 325 TABLET ORAL at 22:18

## 2022-08-05 RX ADMIN — MELATONIN 3 MG ORAL TABLET 3 MG: 3 TABLET ORAL at 22:18

## 2022-08-05 RX ADMIN — SODIUM CHLORIDE, PRESERVATIVE FREE 10 ML: 5 INJECTION INTRAVENOUS at 22:20

## 2022-08-05 RX ADMIN — OXYCODONE AND ACETAMINOPHEN 1 TABLET: 5; 325 TABLET ORAL at 05:02

## 2022-08-05 RX ADMIN — SODIUM CHLORIDE, PRESERVATIVE FREE 10 ML: 5 INJECTION INTRAVENOUS at 08:45

## 2022-08-05 RX ADMIN — OXYCODONE AND ACETAMINOPHEN 1 TABLET: 5; 325 TABLET ORAL at 00:42

## 2022-08-05 RX ADMIN — SODIUM CHLORIDE, PRESERVATIVE FREE 10 ML: 5 INJECTION INTRAVENOUS at 08:50

## 2022-08-05 ASSESSMENT — PAIN DESCRIPTION - ORIENTATION
ORIENTATION: LEFT
ORIENTATION: RIGHT;LEFT
ORIENTATION: LEFT

## 2022-08-05 ASSESSMENT — PAIN - FUNCTIONAL ASSESSMENT: PAIN_FUNCTIONAL_ASSESSMENT: ACTIVITIES ARE NOT PREVENTED

## 2022-08-05 ASSESSMENT — PAIN SCALES - GENERAL
PAINLEVEL_OUTOF10: 9
PAINLEVEL_OUTOF10: 8
PAINLEVEL_OUTOF10: 0
PAINLEVEL_OUTOF10: 6
PAINLEVEL_OUTOF10: 7
PAINLEVEL_OUTOF10: 4
PAINLEVEL_OUTOF10: 3
PAINLEVEL_OUTOF10: 4
PAINLEVEL_OUTOF10: 9

## 2022-08-05 ASSESSMENT — PAIN DESCRIPTION - LOCATION
LOCATION: FOOT
LOCATION: LEG;FOOT
LOCATION: LEG;FOOT
LOCATION: FOOT
LOCATION: FOOT

## 2022-08-05 ASSESSMENT — PAIN DESCRIPTION - DESCRIPTORS
DESCRIPTORS: ACHING;BURNING
DESCRIPTORS: ACHING
DESCRIPTORS: ACHING;BURNING;SHARP
DESCRIPTORS: BURNING;ACHING

## 2022-08-05 ASSESSMENT — PAIN SCALES - WONG BAKER
WONGBAKER_NUMERICALRESPONSE: 0
WONGBAKER_NUMERICALRESPONSE: 2
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
## 2022-08-05 NOTE — PROGRESS NOTES
Infectious Disease  Progress Note  [facility]    Chief Complaint:left great toe pain    Subjective: left great toe amputation    Scheduled Meds:   sodium chloride flush  5-40 mL IntraVENous 2 times per day    enoxaparin  40 mg SubCUTAneous Daily     Continuous Infusions:   sodium chloride       PRN Meds:sodium chloride flush, sodium chloride, ondansetron **OR** ondansetron, polyethylene glycol, acetaminophen **OR** acetaminophen, melatonin, oxyCODONE-acetaminophen    Patient Vitals for the past 24 hrs:   BP Temp Temp src Pulse Resp SpO2   08/05/22 0748 (!) 146/66 97 °F (36.1 °C) Temporal 71 16 94 %   08/05/22 0531 130/68 96.8 °F (36 °C) Temporal 67 16 95 %   08/05/22 0112 -- -- -- -- 16 --   08/04/22 2330 (!) 152/72 97.5 °F (36.4 °C) Temporal 65 16 95 %   08/04/22 2030 -- -- -- -- 17 --   08/04/22 1952 (!) 142/79 (!) 95.2 °F (35.1 °C) Temporal 70 16 93 %   08/04/22 1559 -- -- -- -- 17 --   08/04/22 1529 -- -- -- -- 17 --   08/04/22 1020 -- -- -- -- 16 --       CBC with Differential:    Lab Results   Component Value Date/Time    WBC 7.7 08/05/2022 06:01 AM    RBC 4.80 08/05/2022 06:01 AM    HGB 14.9 08/05/2022 06:01 AM    HCT 43.7 08/05/2022 06:01 AM     08/05/2022 06:01 AM    MCV 91.0 08/05/2022 06:01 AM    MCH 31.0 08/05/2022 06:01 AM    MCHC 34.1 08/05/2022 06:01 AM    RDW 12.7 08/05/2022 06:01 AM    SEGSPCT 68 12/07/2010 02:36 PM    LYMPHOPCT 35.0 08/05/2022 06:01 AM    MONOPCT 6.6 08/05/2022 06:01 AM    BASOPCT 0.8 08/05/2022 06:01 AM    MONOSABS 0.51 08/05/2022 06:01 AM    LYMPHSABS 2.70 08/05/2022 06:01 AM    EOSABS 0.11 08/05/2022 06:01 AM    BASOSABS 0.06 08/05/2022 06:01 AM     CMP:    Lab Results   Component Value Date/Time     08/05/2022 06:01 AM    K 3.4 08/05/2022 06:01 AM     08/05/2022 06:01 AM    CO2 26 08/05/2022 06:01 AM    BUN 6 08/05/2022 06:01 AM    CREATININE 0.5 08/05/2022 06:01 AM    GFRAA >60 08/05/2022 06:01 AM    LABGLOM >60 08/05/2022 06:01 AM    GLUCOSE 101 08/05/2022 06:01 AM    GLUCOSE 105 12/07/2010 02:36 PM    PROT 6.8 08/05/2022 06:01 AM    LABALBU 3.4 08/05/2022 06:01 AM    LABALBU 4.7 12/07/2010 02:36 PM    CALCIUM 9.2 08/05/2022 06:01 AM    BILITOT 0.4 08/05/2022 06:01 AM    ALKPHOS 37 08/05/2022 06:01 AM    AST 11 08/05/2022 06:01 AM    ALT 8 08/05/2022 06:01 AM       BP (!) 146/66   Pulse 71   Temp 97 °F (36.1 °C) (Temporal)   Resp 16   Wt 131 lb (59.4 kg)   SpO2 94%   BMI 17.77 kg/m²     Physical Exam  Const/Neuro- unchanged, no signs of acute distress, Alert  ENMT- Within Normal Limits, Normocephalic, mucous membranes pink/moist, No thrush  Neck: Neck supple  Heart- Regular, Rate, Rhythm- no murmur appreciated. Lungs- clear to ascultation. Respirations even and nonlabored. Abdomen- Soft, bowel sounds positive, non tender  Musculo/Extremities-  Equal and symmetrical, no edema. No tenderness. Skin:  Warm and dry, free from rashes. Cultures reviewed    Radiology reviewed  VL LOWER EXTREMITY ARTERIAL SEGMENTAL PRESSURES W PPG BILATERAL   Final Result      US DUP LOWER EXTREMITIES BILATERAL VENOUS   Final Result   Within the visualized vessels there is no evidence for deep venous   thrombosis               XR TOE RIGHT (MIN 2 VIEWS)   Final Result   Addendum (preliminary) 1 of 1   ADDENDUM:   Correction to the examination type:      3 X-RAY VIEWS OF THE LEFT TOE      Clarification for the         Final   Great toe distal phalangeal tuft osteomyelitis. IMPRESSION:   Left great toe distal phalangeal tuft osteomyelitis.              Assessment    Left great toe osteo     Xray  great toe phalangeal tuft osteomyelitis    Peripheral vascular disease   Plan    Cont vanco and zosyn for now       Pharmacy consult              Check cultures      Baseline ESR, CRP      Monitor labs      Talked to podiatry this morning  left great toe amputation after 4pm today        Electronically signed by Alfredito Morales MD on 8/5/2022 at 8:29 AM

## 2022-08-05 NOTE — CARE COORDINATION
8/5/2022 social work transition of care planning  Sw followed up with pt at bedside to discuss transition of care planning. Pt plan is to return home,pt will call for a ride at discharge. Pt has no preference for [facility]-referral to [facility]. Pt for OR today. Sw will follow.   Electronically signed by COLLIN Smith on 8/5/2022 at 12:15 PM
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
## 2022-08-06 ENCOUNTER — APPOINTMENT (OUTPATIENT)
Dept: GENERAL RADIOLOGY | Age: 67
DRG: 504 | End: 2022-08-06
Payer: MEDICARE

## 2022-08-06 LAB
ALBUMIN SERPL-MCNC: 3.5 G/DL (ref 3.5–5.2)
ALP BLD-CCNC: 35 U/L (ref 35–104)
ALT SERPL-CCNC: <5 U/L (ref 0–32)
ANION GAP SERPL CALCULATED.3IONS-SCNC: 11 MMOL/L (ref 7–16)
AST SERPL-CCNC: 11 U/L (ref 0–31)
BASOPHILS ABSOLUTE: 0.06 E9/L (ref 0–0.2)
BASOPHILS RELATIVE PERCENT: 0.7 % (ref 0–2)
BILIRUB SERPL-MCNC: 0.3 MG/DL (ref 0–1.2)
BUN BLDV-MCNC: 7 MG/DL (ref 6–23)
CALCIUM SERPL-MCNC: 9.6 MG/DL (ref 8.6–10.2)
CHLORIDE BLD-SCNC: 104 MMOL/L (ref 98–107)
CO2: 24 MMOL/L (ref 22–29)
CREAT SERPL-MCNC: 0.5 MG/DL (ref 0.5–1)
EOSINOPHILS ABSOLUTE: 0.07 E9/L (ref 0.05–0.5)
EOSINOPHILS RELATIVE PERCENT: 0.8 % (ref 0–6)
GFR AFRICAN AMERICAN: >60
GFR NON-AFRICAN AMERICAN: >60 ML/MIN/1.73
GLUCOSE BLD-MCNC: 102 MG/DL (ref 74–99)
HCT VFR BLD CALC: 45.1 % (ref 34–48)
HEMOGLOBIN: 15.6 G/DL (ref 11.5–15.5)
IMMATURE GRANULOCYTES #: 0.03 E9/L
IMMATURE GRANULOCYTES %: 0.4 % (ref 0–5)
LYMPHOCYTES ABSOLUTE: 2.6 E9/L (ref 1.5–4)
LYMPHOCYTES RELATIVE PERCENT: 30.9 % (ref 20–42)
MCH RBC QN AUTO: 30.8 PG (ref 26–35)
MCHC RBC AUTO-ENTMCNC: 34.6 % (ref 32–34.5)
MCV RBC AUTO: 89.1 FL (ref 80–99.9)
MONOCYTES ABSOLUTE: 0.46 E9/L (ref 0.1–0.95)
MONOCYTES RELATIVE PERCENT: 5.5 % (ref 2–12)
NEUTROPHILS ABSOLUTE: 5.19 E9/L (ref 1.8–7.3)
NEUTROPHILS RELATIVE PERCENT: 61.7 % (ref 43–80)
ORGANISM: ABNORMAL
PDW BLD-RTO: 12.7 FL (ref 11.5–15)
PLATELET # BLD: 292 E9/L (ref 130–450)
PMV BLD AUTO: 9.8 FL (ref 7–12)
POTASSIUM REFLEX MAGNESIUM: 3.9 MMOL/L (ref 3.5–5)
RBC # BLD: 5.06 E12/L (ref 3.5–5.5)
SODIUM BLD-SCNC: 139 MMOL/L (ref 132–146)
TOTAL PROTEIN: 7 G/DL (ref 6.4–8.3)
WBC # BLD: 8.4 E9/L (ref 4.5–11.5)
WOUND/ABSCESS: ABNORMAL

## 2022-08-06 PROCEDURE — 87077 CULTURE AEROBIC IDENTIFY: CPT

## 2022-08-06 PROCEDURE — 80053 COMPREHEN METABOLIC PANEL: CPT

## 2022-08-06 PROCEDURE — 85025 COMPLETE CBC W/AUTO DIFF WBC: CPT

## 2022-08-06 PROCEDURE — 87075 CULTR BACTERIA EXCEPT BLOOD: CPT

## 2022-08-06 PROCEDURE — 87205 SMEAR GRAM STAIN: CPT

## 2022-08-06 PROCEDURE — 3600000002 HC SURGERY LEVEL 2 BASE: Performed by: PODIATRIST

## 2022-08-06 PROCEDURE — 2580000003 HC RX 258

## 2022-08-06 PROCEDURE — 88311 DECALCIFY TISSUE: CPT

## 2022-08-06 PROCEDURE — 7100000001 HC PACU RECOVERY - ADDTL 15 MIN: Performed by: PODIATRIST

## 2022-08-06 PROCEDURE — 6370000000 HC RX 637 (ALT 250 FOR IP)

## 2022-08-06 PROCEDURE — 2709999900 HC NON-CHARGEABLE SUPPLY: Performed by: PODIATRIST

## 2022-08-06 PROCEDURE — 2580000003 HC RX 258: Performed by: INTERNAL MEDICINE

## 2022-08-06 PROCEDURE — 3700000000 HC ANESTHESIA ATTENDED CARE: Performed by: PODIATRIST

## 2022-08-06 PROCEDURE — 88305 TISSUE EXAM BY PATHOLOGIST: CPT

## 2022-08-06 PROCEDURE — 36415 COLL VENOUS BLD VENIPUNCTURE: CPT

## 2022-08-06 PROCEDURE — 87186 SC STD MICRODIL/AGAR DIL: CPT

## 2022-08-06 PROCEDURE — 6360000002 HC RX W HCPCS

## 2022-08-06 PROCEDURE — 3600000012 HC SURGERY LEVEL 2 ADDTL 15MIN: Performed by: PODIATRIST

## 2022-08-06 PROCEDURE — 2500000003 HC RX 250 WO HCPCS: Performed by: PODIATRIST

## 2022-08-06 PROCEDURE — 73620 X-RAY EXAM OF FOOT: CPT

## 2022-08-06 PROCEDURE — 3700000001 HC ADD 15 MINUTES (ANESTHESIA): Performed by: PODIATRIST

## 2022-08-06 PROCEDURE — 0Y6Q0Z0 DETACHMENT AT LEFT 1ST TOE, COMPLETE, OPEN APPROACH: ICD-10-PCS | Performed by: PODIATRIST

## 2022-08-06 PROCEDURE — 87070 CULTURE OTHR SPECIMN AEROBIC: CPT

## 2022-08-06 PROCEDURE — 7100000000 HC PACU RECOVERY - FIRST 15 MIN: Performed by: PODIATRIST

## 2022-08-06 PROCEDURE — 6360000002 HC RX W HCPCS: Performed by: INTERNAL MEDICINE

## 2022-08-06 PROCEDURE — 1200000000 HC SEMI PRIVATE

## 2022-08-06 RX ORDER — DROPERIDOL 2.5 MG/ML
0.62 INJECTION, SOLUTION INTRAMUSCULAR; INTRAVENOUS
Status: DISCONTINUED | OUTPATIENT
Start: 2022-08-06 | End: 2022-08-06 | Stop reason: HOSPADM

## 2022-08-06 RX ORDER — SODIUM CHLORIDE 0.9 % (FLUSH) 0.9 %
5-40 SYRINGE (ML) INJECTION PRN
Status: DISCONTINUED | OUTPATIENT
Start: 2022-08-06 | End: 2022-08-10 | Stop reason: HOSPADM

## 2022-08-06 RX ORDER — MIDAZOLAM HYDROCHLORIDE 1 MG/ML
INJECTION INTRAMUSCULAR; INTRAVENOUS PRN
Status: DISCONTINUED | OUTPATIENT
Start: 2022-08-06 | End: 2022-08-06 | Stop reason: SDUPTHER

## 2022-08-06 RX ORDER — SODIUM CHLORIDE 0.9 % (FLUSH) 0.9 %
5-40 SYRINGE (ML) INJECTION EVERY 12 HOURS SCHEDULED
Status: DISCONTINUED | OUTPATIENT
Start: 2022-08-06 | End: 2022-08-06 | Stop reason: HOSPADM

## 2022-08-06 RX ORDER — LABETALOL HYDROCHLORIDE 5 MG/ML
5 INJECTION, SOLUTION INTRAVENOUS
Status: DISCONTINUED | OUTPATIENT
Start: 2022-08-06 | End: 2022-08-06 | Stop reason: HOSPADM

## 2022-08-06 RX ORDER — SODIUM CHLORIDE 0.9 % (FLUSH) 0.9 %
5-40 SYRINGE (ML) INJECTION PRN
Status: DISCONTINUED | OUTPATIENT
Start: 2022-08-06 | End: 2022-08-06 | Stop reason: HOSPADM

## 2022-08-06 RX ORDER — ONDANSETRON 2 MG/ML
4 INJECTION INTRAMUSCULAR; INTRAVENOUS
Status: DISCONTINUED | OUTPATIENT
Start: 2022-08-06 | End: 2022-08-06 | Stop reason: HOSPADM

## 2022-08-06 RX ORDER — BUPIVACAINE HYDROCHLORIDE 5 MG/ML
INJECTION, SOLUTION EPIDURAL; INTRACAUDAL PRN
Status: DISCONTINUED | OUTPATIENT
Start: 2022-08-06 | End: 2022-08-06 | Stop reason: ALTCHOICE

## 2022-08-06 RX ORDER — PROPOFOL 10 MG/ML
INJECTION, EMULSION INTRAVENOUS CONTINUOUS PRN
Status: DISCONTINUED | OUTPATIENT
Start: 2022-08-06 | End: 2022-08-06 | Stop reason: SDUPTHER

## 2022-08-06 RX ORDER — ACETAMINOPHEN 325 MG/1
650 TABLET ORAL
Status: DISCONTINUED | OUTPATIENT
Start: 2022-08-06 | End: 2022-08-06 | Stop reason: HOSPADM

## 2022-08-06 RX ORDER — IPRATROPIUM BROMIDE AND ALBUTEROL SULFATE 2.5; .5 MG/3ML; MG/3ML
1 SOLUTION RESPIRATORY (INHALATION)
Status: DISCONTINUED | OUTPATIENT
Start: 2022-08-06 | End: 2022-08-06 | Stop reason: HOSPADM

## 2022-08-06 RX ORDER — SODIUM CHLORIDE 9 MG/ML
25 INJECTION, SOLUTION INTRAVENOUS PRN
Status: DISCONTINUED | OUTPATIENT
Start: 2022-08-06 | End: 2022-08-06 | Stop reason: HOSPADM

## 2022-08-06 RX ORDER — SODIUM CHLORIDE 0.9 % (FLUSH) 0.9 %
5-40 SYRINGE (ML) INJECTION EVERY 12 HOURS SCHEDULED
Status: DISCONTINUED | OUTPATIENT
Start: 2022-08-06 | End: 2022-08-10 | Stop reason: HOSPADM

## 2022-08-06 RX ORDER — FENTANYL CITRATE 50 UG/ML
INJECTION, SOLUTION INTRAMUSCULAR; INTRAVENOUS PRN
Status: DISCONTINUED | OUTPATIENT
Start: 2022-08-06 | End: 2022-08-06 | Stop reason: SDUPTHER

## 2022-08-06 RX ORDER — DIPHENHYDRAMINE HYDROCHLORIDE 50 MG/ML
12.5 INJECTION INTRAMUSCULAR; INTRAVENOUS
Status: DISCONTINUED | OUTPATIENT
Start: 2022-08-06 | End: 2022-08-06 | Stop reason: HOSPADM

## 2022-08-06 RX ORDER — MIDAZOLAM HYDROCHLORIDE 2 MG/2ML
2 INJECTION, SOLUTION INTRAMUSCULAR; INTRAVENOUS
Status: DISCONTINUED | OUTPATIENT
Start: 2022-08-06 | End: 2022-08-06 | Stop reason: HOSPADM

## 2022-08-06 RX ORDER — SODIUM CHLORIDE 9 MG/ML
INJECTION, SOLUTION INTRAVENOUS CONTINUOUS PRN
Status: DISCONTINUED | OUTPATIENT
Start: 2022-08-06 | End: 2022-08-06 | Stop reason: SDUPTHER

## 2022-08-06 RX ORDER — SODIUM CHLORIDE 9 MG/ML
INJECTION, SOLUTION INTRAVENOUS PRN
Status: DISCONTINUED | OUTPATIENT
Start: 2022-08-06 | End: 2022-08-10 | Stop reason: HOSPADM

## 2022-08-06 RX ORDER — HYDRALAZINE HYDROCHLORIDE 20 MG/ML
5 INJECTION INTRAMUSCULAR; INTRAVENOUS
Status: DISCONTINUED | OUTPATIENT
Start: 2022-08-06 | End: 2022-08-06 | Stop reason: HOSPADM

## 2022-08-06 RX ADMIN — OXYCODONE AND ACETAMINOPHEN 1 TABLET: 5; 325 TABLET ORAL at 20:36

## 2022-08-06 RX ADMIN — ATORVASTATIN CALCIUM 40 MG: 40 TABLET, FILM COATED ORAL at 20:36

## 2022-08-06 RX ADMIN — OXYCODONE AND ACETAMINOPHEN 1 TABLET: 5; 325 TABLET ORAL at 04:48

## 2022-08-06 RX ADMIN — MIDAZOLAM 2 MG: 1 INJECTION INTRAMUSCULAR; INTRAVENOUS at 11:06

## 2022-08-06 RX ADMIN — SODIUM CHLORIDE: 9 INJECTION, SOLUTION INTRAVENOUS at 11:05

## 2022-08-06 RX ADMIN — OXYCODONE AND ACETAMINOPHEN 1 TABLET: 5; 325 TABLET ORAL at 15:36

## 2022-08-06 RX ADMIN — SODIUM CHLORIDE, PRESERVATIVE FREE 10 ML: 5 INJECTION INTRAVENOUS at 20:37

## 2022-08-06 RX ADMIN — PROPOFOL 75 MCG/KG/MIN: 10 INJECTION, EMULSION INTRAVENOUS at 11:09

## 2022-08-06 RX ADMIN — SODIUM CHLORIDE, PRESERVATIVE FREE 10 ML: 5 INJECTION INTRAVENOUS at 08:47

## 2022-08-06 RX ADMIN — MELATONIN 3 MG ORAL TABLET 3 MG: 3 TABLET ORAL at 20:36

## 2022-08-06 RX ADMIN — FENTANYL CITRATE 50 MCG: 50 INJECTION, SOLUTION INTRAMUSCULAR; INTRAVENOUS at 11:10

## 2022-08-06 RX ADMIN — VANCOMYCIN HYDROCHLORIDE 1000 MG: 1 INJECTION, POWDER, LYOPHILIZED, FOR SOLUTION INTRAVENOUS at 15:38

## 2022-08-06 RX ADMIN — OXYCODONE AND ACETAMINOPHEN 1 TABLET: 5; 325 TABLET ORAL at 08:47

## 2022-08-06 RX ADMIN — PIPERACILLIN AND TAZOBACTAM 3375 MG: 3; .375 INJECTION, POWDER, LYOPHILIZED, FOR SOLUTION INTRAVENOUS at 16:47

## 2022-08-06 RX ADMIN — FENTANYL CITRATE 50 MCG: 50 INJECTION, SOLUTION INTRAMUSCULAR; INTRAVENOUS at 11:15

## 2022-08-06 ASSESSMENT — PAIN DESCRIPTION - LOCATION
LOCATION: FOOT

## 2022-08-06 ASSESSMENT — PAIN - FUNCTIONAL ASSESSMENT
PAIN_FUNCTIONAL_ASSESSMENT: ACTIVITIES ARE NOT PREVENTED
PAIN_FUNCTIONAL_ASSESSMENT: ACTIVITIES ARE NOT PREVENTED

## 2022-08-06 ASSESSMENT — PAIN DESCRIPTION - ONSET
ONSET: ON-GOING
ONSET: ON-GOING

## 2022-08-06 ASSESSMENT — PAIN DESCRIPTION - FREQUENCY: FREQUENCY: CONTINUOUS

## 2022-08-06 ASSESSMENT — PAIN DESCRIPTION - DESCRIPTORS
DESCRIPTORS: ACHING;DULL;DISCOMFORT
DESCRIPTORS: ACHING;DULL;DISCOMFORT
DESCRIPTORS: BURNING;DISCOMFORT;TINGLING
DESCRIPTORS: ACHING;DISCOMFORT;BURNING

## 2022-08-06 ASSESSMENT — PAIN DESCRIPTION - PAIN TYPE
TYPE: ACUTE PAIN
TYPE: ACUTE PAIN

## 2022-08-06 ASSESSMENT — PAIN SCALES - GENERAL
PAINLEVEL_OUTOF10: 9
PAINLEVEL_OUTOF10: 8
PAINLEVEL_OUTOF10: 7
PAINLEVEL_OUTOF10: 5
PAINLEVEL_OUTOF10: 0
PAINLEVEL_OUTOF10: 0
PAINLEVEL_OUTOF10: 6
PAINLEVEL_OUTOF10: 8

## 2022-08-06 ASSESSMENT — PAIN DESCRIPTION - ORIENTATION
ORIENTATION: LEFT
ORIENTATION: LEFT
ORIENTATION: LEFT;RIGHT
ORIENTATION: LEFT

## 2022-08-06 NOTE — PROGRESS NOTES
Infectious Disease  Progress Note  [facility]    Chief Complaint:left great toe pain    Subjective: left great toe amputation    Scheduled Meds:   atorvastatin  40 mg Oral Nightly    sodium chloride flush  5-40 mL IntraVENous 2 times per day    enoxaparin  40 mg SubCUTAneous Daily     Continuous Infusions:   sodium chloride       PRN Meds:sodium chloride flush, sodium chloride, ondansetron **OR** ondansetron, polyethylene glycol, acetaminophen **OR** acetaminophen, melatonin, oxyCODONE-acetaminophen    Patient Vitals for the past 24 hrs:   BP Temp Temp src Pulse Resp SpO2   08/06/22 0815 (!) 165/80 98.1 °F (36.7 °C) Temporal 65 18 94 %   08/06/22 0448 -- -- -- -- 18 --   08/05/22 2248 -- -- -- -- 16 --   08/05/22 2218 -- -- -- -- 16 --   08/05/22 1945 (!) 145/72 98.1 °F (36.7 °C) Temporal 67 16 97 %   08/05/22 1813 -- -- -- -- 16 --   08/05/22 1317 -- -- -- -- 16 --   08/05/22 1247 -- -- -- -- 16 --       CBC with Differential:    Lab Results   Component Value Date/Time    WBC 8.4 08/06/2022 06:37 AM    RBC 5.06 08/06/2022 06:37 AM    HGB 15.6 08/06/2022 06:37 AM    HCT 45.1 08/06/2022 06:37 AM     08/06/2022 06:37 AM    MCV 89.1 08/06/2022 06:37 AM    MCH 30.8 08/06/2022 06:37 AM    MCHC 34.6 08/06/2022 06:37 AM    RDW 12.7 08/06/2022 06:37 AM    SEGSPCT 68 12/07/2010 02:36 PM    LYMPHOPCT 30.9 08/06/2022 06:37 AM    MONOPCT 5.5 08/06/2022 06:37 AM    BASOPCT 0.7 08/06/2022 06:37 AM    MONOSABS 0.46 08/06/2022 06:37 AM    LYMPHSABS 2.60 08/06/2022 06:37 AM    EOSABS 0.07 08/06/2022 06:37 AM    BASOSABS 0.06 08/06/2022 06:37 AM     CMP:    Lab Results   Component Value Date/Time     08/06/2022 06:37 AM    K 3.9 08/06/2022 06:37 AM     08/06/2022 06:37 AM    CO2 24 08/06/2022 06:37 AM    BUN 7 08/06/2022 06:37 AM    CREATININE 0.5 08/06/2022 06:37 AM    GFRAA >60 08/06/2022 06:37 AM    LABGLOM >60 08/06/2022 06:37 AM    GLUCOSE 102 08/06/2022 06:37 AM    GLUCOSE 105 12/07/2010 02:36 PM    PROT 7.0 08/06/2022 06:37 AM    LABALBU 3.5 08/06/2022 06:37 AM    LABALBU 4.7 12/07/2010 02:36 PM    CALCIUM 9.6 08/06/2022 06:37 AM    BILITOT 0.3 08/06/2022 06:37 AM    ALKPHOS 35 08/06/2022 06:37 AM    AST 11 08/06/2022 06:37 AM    ALT <5 08/06/2022 06:37 AM       BP (!) 165/80   Pulse 65   Temp 98.1 °F (36.7 °C) (Temporal)   Resp 18   Wt 131 lb (59.4 kg)   SpO2 94%   BMI 17.77 kg/m²     Physical Exam  Const/Neuro- unchanged, no signs of acute distress, Alert  ENMT- Within Normal Limits, Normocephalic, mucous membranes pink/moist, No thrush  Neck: Neck supple  Heart- Regular, Rate, Rhythm- no murmur appreciated. Lungs- clear to ascultation. Respirations even and nonlabored. Abdomen- Soft, bowel sounds positive, non tender  Musculo/Extremities-  Equal and symmetrical, no edema. No tenderness. Skin:  Warm and dry, free from rashes. Cultures reviewed    Radiology reviewed  VL LOWER EXTREMITY ARTERIAL SEGMENTAL PRESSURES W PPG BILATERAL   Final Result      US DUP LOWER EXTREMITIES BILATERAL VENOUS   Final Result   Within the visualized vessels there is no evidence for deep venous   thrombosis               XR TOE RIGHT (MIN 2 VIEWS)   Final Result   Addendum (preliminary) 1 of 1   ADDENDUM:   Correction to the examination type:      3 X-RAY VIEWS OF THE LEFT TOE      Clarification for the         Final   Great toe distal phalangeal tuft osteomyelitis. IMPRESSION:   Left great toe distal phalangeal tuft osteomyelitis.              Assessment    Left great toe osteo     Xray  great toe phalangeal tuft osteomyelitis    Peripheral vascular disease   Plan    Cont vanco and zosyn for now       Pharmacy consult              Check cultures      Baseline ESR, CRP      Monitor labs    OR AMPUTATION   of first digit today        Electronically signed by Alfredito Morales MD on 8/6/2022 at 11:17 AM
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
## 2022-08-06 NOTE — BRIEF OP NOTE
Brief Postoperative Note      Patient: Galileo Roth  YOB: 1955  MRN: [id number]    Date of Procedure: 8/6/2022    Pre-Op Diagnosis: Left great toe acute OM    Post-Op Diagnosis: Same       Procedure(s):  1ST DIGIT LEFT FOOT AMPUTATION    Surgeon(s):  Ramy Marylee Bow, DPM    Assistant:  Resident: Damaris Nye DPM    Anesthesia: Monitor Anesthesia Care    Estimated Blood Loss (mL): Minimal    Complications: None    Specimens:   ID Type Source Tests Collected by Time Destination   1 : first toe - left Tissue Tissue CULTURE, ANAEROBIC, CULTURE, FUNGUS, GRAM STAIN, CULTURE, SURGICAL, CULTURE WITH SMEAR, ACID FAST BACILLIUS Tal Fournier DPM 8/6/2022 1119    A : first toe - left Tissue Tissue SURGICAL PATHOLOGY Tal Fournier DPM 8/6/2022 1113        Implants:  * No implants in log *      Drains: * No LDAs found *    Findings: Necrosis of soft tissue and bone left great toe.     Electronically signed by Marisela Rm DPM on 8/6/2022 at 11:26 AM

## 2022-08-06 NOTE — OP NOTE
[address]                  [address] [facility], [address] [place] [address]                                OPERATIVE REPORT    PATIENT NAME: Fermin Woods                    :        1955  MED REC NO:   [id number]                            ROOM:  ACCOUNT NO:   [de-identified]                           ADMIT DATE: 2022  PROVIDER:     Emiliano Bernardo DPM    DATE OF PROCEDURE:  2022    SURGEON:  Emiliano Bernardo DPM    ASSISTANT:  Lanny Messina, PGY-2    PREOPERATIVE DIAGNOSES:  Left great toe acute osteomyelitis. POSTOPERATIVE DIAGNOSES:  Left great toe acute osteomyelitis. PROCEDURES:  Amputation, left great toe, at the level of  metatarsophalangeal joint. ANESTHESIA:  Monitored anesthesia care. INJECTABLES:  20 mL of 0.5% Marcaine plain. ESTIMATED BLOOD LOSS:  Minimal.    HEMOSTASIS:  On the field. COMPLICATIONS:  None. SPECIMEN OBTAINED:  Left great toe for pathological examination and for  microbiology. INTRAOPERATIVE FINDINGS:  Necrosis, soft tissue and bone of the left  great toe. MATERIALS USED:  3-0 nylon suture. INDICATIONS:  This is a pleasant 72-year-old female presents today for  left great toe amputation. Counseled the patient nature of the problem,  proposed course of procedure, potential benefits, risks, complications  and convalescence in detail. All questions answered to the patient's  apparent satisfaction. No guarantees have been given as to the outcome  of procedure. The patient is anxious to go forward with proposed  surgical plan. I did  her on the nature of the problem, proposed  course of the procedure, potential benefits, risks, complications, and  convalescence in detail. DESCRIPTION OF PROCEDURE:  Under mild sedation, the patient was  transferred to the operative room and placed on the operating table in  supine position. The left foot was scrubbed, prepped and draped in the  usual sterile fashion. At this time, using #10 blade, I performed a  linear incision dorsal aspect of the left great toe. Incision was taken  to the subcutaneous tissue. Bleeders were ligated as appropriate. Next, medial and lateral incisions were then made across the toe,  connecting all the way to the plantar aspect of the digit and extending  the incision over 2 cm proximal.  At this point, using sharp dissection,  was able to then disarticulate the toe from the metatarsophalangeal  joint area. I was able to disarticulate the toe from the  metatarsophalangeal joint. The toe was sent off for pathologic  examination and microscopic examination. Next,  I irrigated the area  using copious amounts of normal saline. Post irrigation, closed that  incision using 3-0 nylon suture. Excellent closure was noted without  any tension on the flap. Capillary refill was brisk to the flap. Postoperative bandage was then applied. The patient tolerated the  procedure and anesthesia well in apparent satisfactory addition. Vital  signs stable, vascular status intact to left lower extremity. The  patient was transferred to PACU for further monitoring prior to  readmission to the nursing floor.         Katie Means DPM    D: 08/06/2022 11:30:25       T: 08/06/2022 11:32:45     [email or web address]  Job#: [id number]     Doc#: [id number]    CC:

## 2022-08-07 LAB — GRAM STAIN ORDERABLE: NORMAL

## 2022-08-07 PROCEDURE — 6360000002 HC RX W HCPCS: Performed by: INTERNAL MEDICINE

## 2022-08-07 PROCEDURE — 1200000000 HC SEMI PRIVATE

## 2022-08-07 PROCEDURE — 6360000002 HC RX W HCPCS

## 2022-08-07 PROCEDURE — 6370000000 HC RX 637 (ALT 250 FOR IP)

## 2022-08-07 PROCEDURE — 2580000003 HC RX 258: Performed by: INTERNAL MEDICINE

## 2022-08-07 PROCEDURE — 97161 PT EVAL LOW COMPLEX 20 MIN: CPT

## 2022-08-07 PROCEDURE — 97530 THERAPEUTIC ACTIVITIES: CPT

## 2022-08-07 PROCEDURE — 97535 SELF CARE MNGMENT TRAINING: CPT

## 2022-08-07 PROCEDURE — 97165 OT EVAL LOW COMPLEX 30 MIN: CPT

## 2022-08-07 PROCEDURE — 2580000003 HC RX 258

## 2022-08-07 RX ADMIN — OXYCODONE AND ACETAMINOPHEN 1 TABLET: 5; 325 TABLET ORAL at 02:06

## 2022-08-07 RX ADMIN — PIPERACILLIN AND TAZOBACTAM 3375 MG: 3; .375 INJECTION, POWDER, LYOPHILIZED, FOR SOLUTION INTRAVENOUS at 08:39

## 2022-08-07 RX ADMIN — ATORVASTATIN CALCIUM 40 MG: 40 TABLET, FILM COATED ORAL at 19:40

## 2022-08-07 RX ADMIN — OXYCODONE AND ACETAMINOPHEN 1 TABLET: 5; 325 TABLET ORAL at 19:40

## 2022-08-07 RX ADMIN — VANCOMYCIN HYDROCHLORIDE 1000 MG: 1 INJECTION, POWDER, LYOPHILIZED, FOR SOLUTION INTRAVENOUS at 02:42

## 2022-08-07 RX ADMIN — OXYCODONE AND ACETAMINOPHEN 1 TABLET: 5; 325 TABLET ORAL at 11:36

## 2022-08-07 RX ADMIN — SODIUM CHLORIDE, PRESERVATIVE FREE 10 ML: 5 INJECTION INTRAVENOUS at 02:07

## 2022-08-07 RX ADMIN — ENOXAPARIN SODIUM 40 MG: 100 INJECTION SUBCUTANEOUS at 08:37

## 2022-08-07 RX ADMIN — MELATONIN 3 MG ORAL TABLET 3 MG: 3 TABLET ORAL at 19:50

## 2022-08-07 RX ADMIN — OXYCODONE AND ACETAMINOPHEN 1 TABLET: 5; 325 TABLET ORAL at 07:02

## 2022-08-07 RX ADMIN — VANCOMYCIN HYDROCHLORIDE 1000 MG: 1 INJECTION, POWDER, LYOPHILIZED, FOR SOLUTION INTRAVENOUS at 15:38

## 2022-08-07 RX ADMIN — OXYCODONE AND ACETAMINOPHEN 1 TABLET: 5; 325 TABLET ORAL at 15:38

## 2022-08-07 RX ADMIN — PIPERACILLIN AND TAZOBACTAM 3375 MG: 3; .375 INJECTION, POWDER, LYOPHILIZED, FOR SOLUTION INTRAVENOUS at 02:07

## 2022-08-07 RX ADMIN — PIPERACILLIN AND TAZOBACTAM 3375 MG: 3; .375 INJECTION, POWDER, LYOPHILIZED, FOR SOLUTION INTRAVENOUS at 16:54

## 2022-08-07 RX ADMIN — SODIUM CHLORIDE, PRESERVATIVE FREE 10 ML: 5 INJECTION INTRAVENOUS at 08:37

## 2022-08-07 RX ADMIN — SODIUM CHLORIDE, PRESERVATIVE FREE 10 ML: 5 INJECTION INTRAVENOUS at 19:40

## 2022-08-07 ASSESSMENT — PAIN SCALES - GENERAL
PAINLEVEL_OUTOF10: 8
PAINLEVEL_OUTOF10: 8
PAINLEVEL_OUTOF10: 0
PAINLEVEL_OUTOF10: 5
PAINLEVEL_OUTOF10: 0
PAINLEVEL_OUTOF10: 0
PAINLEVEL_OUTOF10: 8
PAINLEVEL_OUTOF10: 9
PAINLEVEL_OUTOF10: 7

## 2022-08-07 ASSESSMENT — PAIN DESCRIPTION - ORIENTATION
ORIENTATION: LEFT

## 2022-08-07 ASSESSMENT — PAIN DESCRIPTION - PAIN TYPE
TYPE: SURGICAL PAIN

## 2022-08-07 ASSESSMENT — PAIN DESCRIPTION - DESCRIPTORS
DESCRIPTORS: ACHING;DISCOMFORT;THROBBING
DESCRIPTORS: ACHING;DULL;DISCOMFORT
DESCRIPTORS: ACHING;DISCOMFORT;THROBBING
DESCRIPTORS: ACHING;DULL;DISCOMFORT
DESCRIPTORS: ACHING;DISCOMFORT;THROBBING

## 2022-08-07 ASSESSMENT — PAIN DESCRIPTION - ONSET
ONSET: ON-GOING

## 2022-08-07 ASSESSMENT — PAIN DESCRIPTION - FREQUENCY
FREQUENCY: CONTINUOUS

## 2022-08-07 ASSESSMENT — PAIN DESCRIPTION - LOCATION
LOCATION: FOOT
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
## 2022-08-07 NOTE — PROGRESS NOTES
Infectious Disease  Progress Note  [facility]    Chief Complaint:left great toe pain    Subjective: left great toe amputation    Scheduled Meds:   sodium chloride flush  5-40 mL IntraVENous 2 times per day    vancomycin  1,000 mg IntraVENous Q12H    piperacillin-tazobactam  3,375 mg IntraVENous Q8H    atorvastatin  40 mg Oral Nightly    enoxaparin  40 mg SubCUTAneous Daily     Continuous Infusions:   sodium chloride       PRN Meds:sodium chloride flush, sodium chloride, ondansetron **OR** ondansetron, polyethylene glycol, acetaminophen **OR** acetaminophen, melatonin, oxyCODONE-acetaminophen    Patient Vitals for the past 24 hrs:   BP Temp Temp src Pulse Resp SpO2 Weight   08/07/22 0714 (!) 166/61 97.9 °F (36.6 °C) Temporal 63 16 92 % --   08/07/22 0456 -- -- -- -- -- -- 131 lb (59.4 kg)   08/06/22 2106 -- -- -- -- 18 -- --   08/06/22 2036 -- -- -- -- 18 -- --   08/06/22 1945 (!) 165/73 98 °F (36.7 °C) Temporal 67 18 95 % --   08/06/22 1536 (!) 159/71 97 °F (36.1 °C) Temporal 68 17 95 % --   08/06/22 1249 (!) 169/99 97 °F (36.1 °C) Temporal 72 18 91 % --   08/06/22 1200 (!) 144/70 97.6 °F (36.4 °C) Temporal 66 18 95 % --   08/06/22 1145 130/80 -- -- 74 21 94 % --   08/06/22 1140 116/70 -- -- 80 18 94 % --   08/06/22 1136 119/63 97.5 °F (36.4 °C) Temporal 77 16 94 % --       CBC with Differential:    Lab Results   Component Value Date/Time    WBC 8.4 08/06/2022 06:37 AM    RBC 5.06 08/06/2022 06:37 AM    HGB 15.6 08/06/2022 06:37 AM    HCT 45.1 08/06/2022 06:37 AM     08/06/2022 06:37 AM    MCV 89.1 08/06/2022 06:37 AM    MCH 30.8 08/06/2022 06:37 AM    MCHC 34.6 08/06/2022 06:37 AM    RDW 12.7 08/06/2022 06:37 AM    SEGSPCT 68 12/07/2010 02:36 PM    LYMPHOPCT 30.9 08/06/2022 06:37 AM    MONOPCT 5.5 08/06/2022 06:37 AM    BASOPCT 0.7 08/06/2022 06:37 AM    MONOSABS 0.46 08/06/2022 06:37 AM    LYMPHSABS 2.60 08/06/2022 06:37 AM    EOSABS 0.07 08/06/2022 06:37 AM    BASOSABS 0.06 08/06/2022 06:37 AM     CMP:    Lab Results   Component Value Date/Time     08/06/2022 06:37 AM    K 3.9 08/06/2022 06:37 AM     08/06/2022 06:37 AM    CO2 24 08/06/2022 06:37 AM    BUN 7 08/06/2022 06:37 AM    CREATININE 0.5 08/06/2022 06:37 AM    GFRAA >60 08/06/2022 06:37 AM    LABGLOM >60 08/06/2022 06:37 AM    GLUCOSE 102 08/06/2022 06:37 AM    GLUCOSE 105 12/07/2010 02:36 PM    PROT 7.0 08/06/2022 06:37 AM    LABALBU 3.5 08/06/2022 06:37 AM    LABALBU 4.7 12/07/2010 02:36 PM    CALCIUM 9.6 08/06/2022 06:37 AM    BILITOT 0.3 08/06/2022 06:37 AM    ALKPHOS 35 08/06/2022 06:37 AM    AST 11 08/06/2022 06:37 AM    ALT <5 08/06/2022 06:37 AM       BP (!) 166/61   Pulse 63   Temp 97.9 °F (36.6 °C) (Temporal)   Resp 16   Wt 131 lb (59.4 kg)   SpO2 92%   BMI 17.77 kg/m²     Physical Exam  Const/Neuro- unchanged, no signs of acute distress, Alert  ENMT- Within Normal Limits, Normocephalic, mucous membranes pink/moist, No thrush  Neck: Neck supple  Heart- Regular, Rate, Rhythm- no murmur appreciated. Lungs- clear to ascultation. Respirations even and nonlabored. Abdomen- Soft, bowel sounds positive, non tender  Musculo/Extremities-  Equal and symmetrical, no edema. No tenderness. Skin:  Warm and dry, free from rashes. Cultures reviewed    Radiology reviewed  XR FOOT LEFT (2 VIEWS)   Final Result   Postsurgical changes related to amputation of the proximal and distal 1st   phalanges. VL LOWER EXTREMITY ARTERIAL SEGMENTAL PRESSURES W PPG BILATERAL   Final Result      US DUP LOWER EXTREMITIES BILATERAL VENOUS   Final Result   Within the visualized vessels there is no evidence for deep venous   thrombosis               XR TOE RIGHT (MIN 2 VIEWS)   Final Result   Addendum (preliminary) 1 of 1   ADDENDUM:   Correction to the examination type:      3 X-RAY VIEWS OF THE LEFT TOE      Clarification for the         Final   Great toe distal phalangeal tuft osteomyelitis.          IMPRESSION:   Left great toe distal phalangeal tuft osteomyelitis.              Assessment    Left great toe osteo     Xray  great toe phalangeal tuft osteomyelitis    Peripheral vascular disease   Plan    Cont vanco and zosyn for now       Pharmacy consult              Check cultures      Baseline ESR, CRP      Monitor labs    OR AMPUTATION   of first digit yesterday  Hopefully can narrow antibiotics          Electronically signed by Amina Knapp MD on 8/7/2022 at 9:54 AM

## 2022-08-07 NOTE — PROGRESS NOTES
Occupational Therapy  Facility/Department: [facility]  Occupational Therapy Initial Assessment    Name: Galileo Roth  : 1955  MRN: [id number]  Date of Service: 2022  Room: 5413A    Evaluating OT: Didier Lux OTR/L [id number]  Referring Provider:MD Mo  Specific Provider Orders: 22  Recommended Adaptive Equipment: tub seat      Diagnosis: osteomyelitis L great toe- s/p great toe amputation  Pertinent Medical History: HLD, PAD, peripheral neuropathy & eczema  Precautions:  Fall Risk, 50% weight bearing LLE with surgical shoe on    Assessment of current deficits   [x] Functional mobility  [x]ADLs  [] Strength               []Cognition   [x] Functional transfers   [x] IADLs         [] Safety Awareness   [x]Endurance   [] Fine Coordination              [x] Balance      [] Vision/perception   []Sensation    []Gross Motor Coordination  [] ROM  [] Delirium                   [] Motor Control     OT PLAN OF CARE   OT POC based on physician orders, patient diagnosis and results of clinical assessment    Frequency/Duration: 1-3 days/wk for 2 weeks PRN   Specific OT Treatment to include:   * Instruction/training on adapted ADL techniques and AE recommendations to increase functional independence within precautions       * Training on energy conservation strategies, correct breathing pattern and techniques to improve independence/tolerance for self-care routine  * Functional transfer/mobility training/DME recommendations for increased independence, safety, and fall prevention  * Patient/Family education to increase follow through with safety techniques and functional independence  * Recommendation of environmental modifications for increased safety with functional transfers/mobility and ADLs  * Therapeutic exercise to improve motor endurance, ROM, and functional strength for ADLs/functional transfers  * Therapeutic activities to facilitate/challenge dynamic balance, stand tolerance for increased safety and independence with ADLs    Modified Carla Scale (MRS)  Score     Description  0             No symptoms  1             No significant disability despite symptoms  2             Slight disability; able to look after own affairs  3             Moderate disability; able to ambulate without assist/ requires assist with ADLs  4             Moderate/Severe disability;requires assist to ambulate/assist with ADLs  5             Severe disability;bedridden/incontinent   6               Score:2    Home Living: Pt lives alone with her cat Vira Buitrago the man) in a 5th floor apartment that has a lsvel entry & elevator assess & laundry on  & uses elevator to assess  Bathroom setup: tub-curtain  Equipment owned: none  Prior Level of Function: independent with ADLs , independent with IADLs; using no device for functional mobility   Driving: yes  Occupation: retired on disability  Comments: Pt daughter & niece live close by     Pain Level: Pt did not c/o pain   Cognition: A&O: 4/4; Follows 2-3 step directions    Memory:  good-    Sequencing:  good -   Problem solving:  good-   Judgement/safety:  good-    Functional Assessment:  AM-PAC Daily Activity Raw Score:    Initial Eval Status  Date: 22 Treatment Status  Date: STGs=LTGs  Time Frame: 3 days   Feeding independent     Grooming SBA to stand & wash her hands  independent   UB Dressing S/u  independent   LB Dressing Min A- assist to don surgical shoe  Mod I    Bathing SBA-CGA- simulated  Mod I    Toileting Sup  Mod I    Bed Mobility  Supine <> sit: SBA      Supine <> sit: independent      Functional Transfers Sit <> stand:SBA & vc's for hand placement     Stand pivot: SBA- rw &vc's for 50% weight bearing   Commode: SBA ambulating with a rw to a standard commode & min vc's for weight bearing restrictions & walker management   Mod I    Functional Mobility SBA functional mobility in her room & vc's for sequencing & weight bearing restrictions  Mod I    Balance Sitting:     Static:  G-    Dynamic:F+  Standing: G-   Sitting:  WFL- static & dynamic   Standing;  Static G  Dynamic G   Activity Tolerance F+  Pt demo G- endurance for a 20 minute functional activity   Visual/  Perceptual WFL   pt able to read clock on wall    visual tracking appears WFL              Hand dominance: R  UE ROM: RUE: WFL  LUE: WFL   Strength: RUE: grossly 4/5 LUE: grossly 4/5   Strength: B WFL  Fine Motor Coordination:  WFL finger to nose assessment appears WFL    Hearing: WFL  Sensation:  No c/o numbness/tingling light touch assessment appears WFL  Tone:  WFL  Edema: none noted                            Comments:Cleared by RN to see pt. Upon arrival, patient and agreeable to OT session. At end of session, patient supine in bed & her brother was present with call light and phone within reach, all lines and tubes intact. Pt pleasant & cooperative throughout the OT session & responded well to education but needed reinforcement of the education throughout the OT session. Pt would benefit from continued OT to increase functional independence and quality of life. Treatment:  Pt required vc's for proper technique/safety with hand placement/body mechanics/posture for bed mobility/ADLs/functional tranfers/mobility/ww management. Pt required vc's for sequencing/initiation of ADLs/functional transfers. Pt able to sit EOB  ~5 mins to increase core strength/balance/activity tolerance for ease with ADLs & stood @ sink 3 minutes. Pt educated on safety, energy conservation strategies & dressing strategies to promote pt functional independence. Pt required increased time to complete ADLs/functional transfers. Pt required skilled monitoring of SpO2 during session and education on energy conservation techniques. Pt required rest breaks during session and educated on pursed lip breathing.  Pt appeared to have tolerated session well and appears motivated to engage in OT to improve pt functional abilities to return home. Pt instructed on use of call light for assistance and fall prevention. Pt demo'ing good understanding of education provided. Continue to educate. Eval Complexity: Low  History: Expanded chart review of medical records and additional review of physical, cognitive, or psychosocial history related to current functional performance  Exam: 3+ performance deficits  Assistance/Modification: Min/mod assistance or modifications required to perform tasks. May have comorbidities that affect occupational performance. Rehab Potential: Good for established goals, pt. assisted in establishment of goals. Pt goal- \"To do everything- wash clothes, run the sweeper, clean palomo litter. \"    LTG: maximize independence with ADLs to return to PLOF    Patient and/or family were instructed on diagnosis, prognosis/goals and plan of care. Demonstrated Fair understanding. [] Malnutrition indicators have been identified and nursing has been notified to ensure a dietitian consult is ordered. Evaluation time includes thorough review of current medical information, gathering information on past medical & social history & PLOF, completion of standardized testing, informal observation of tasks, consultation with other medical professions/disciplines, assessment of data & development of POC/goals.      Time In:940       Time Out: 1015     Total treatment time: 20 Min      Treatment Charges: Mins Units   OT Eval Low 75232 15    OT Eval Medium 07646     OT Eval High 62199     OT Re-Eval 16646     Ther Ex  92821       Manual Therapy 56801       Thera Activities 99892  10 1   ADL/Home Mgt 11462 10 1   Neuro Re-ed 57467       Group Therapy        Orthotic manage/training  12225       Non-Billable Time           Gogo Gurpreet OTR/L [id number]

## 2022-08-08 LAB — ANAEROBIC CULTURE: NORMAL

## 2022-08-08 PROCEDURE — 97535 SELF CARE MNGMENT TRAINING: CPT

## 2022-08-08 PROCEDURE — 6360000002 HC RX W HCPCS: Performed by: INTERNAL MEDICINE

## 2022-08-08 PROCEDURE — 6370000000 HC RX 637 (ALT 250 FOR IP)

## 2022-08-08 PROCEDURE — 2580000003 HC RX 258

## 2022-08-08 PROCEDURE — 1200000000 HC SEMI PRIVATE

## 2022-08-08 PROCEDURE — 6360000002 HC RX W HCPCS

## 2022-08-08 PROCEDURE — 2580000003 HC RX 258: Performed by: INTERNAL MEDICINE

## 2022-08-08 RX ADMIN — VANCOMYCIN HYDROCHLORIDE 1000 MG: 1 INJECTION, POWDER, LYOPHILIZED, FOR SOLUTION INTRAVENOUS at 02:04

## 2022-08-08 RX ADMIN — SODIUM CHLORIDE, PRESERVATIVE FREE 10 ML: 5 INJECTION INTRAVENOUS at 08:05

## 2022-08-08 RX ADMIN — PIPERACILLIN AND TAZOBACTAM 3375 MG: 3; .375 INJECTION, POWDER, LYOPHILIZED, FOR SOLUTION INTRAVENOUS at 09:01

## 2022-08-08 RX ADMIN — ATORVASTATIN CALCIUM 40 MG: 40 TABLET, FILM COATED ORAL at 21:42

## 2022-08-08 RX ADMIN — OXYCODONE AND ACETAMINOPHEN 1 TABLET: 5; 325 TABLET ORAL at 21:42

## 2022-08-08 RX ADMIN — SODIUM CHLORIDE, PRESERVATIVE FREE 10 ML: 5 INJECTION INTRAVENOUS at 21:42

## 2022-08-08 RX ADMIN — OXYCODONE AND ACETAMINOPHEN 1 TABLET: 5; 325 TABLET ORAL at 17:37

## 2022-08-08 RX ADMIN — OXYCODONE AND ACETAMINOPHEN 1 TABLET: 5; 325 TABLET ORAL at 04:45

## 2022-08-08 RX ADMIN — OXYCODONE AND ACETAMINOPHEN 1 TABLET: 5; 325 TABLET ORAL at 09:04

## 2022-08-08 RX ADMIN — OXYCODONE AND ACETAMINOPHEN 1 TABLET: 5; 325 TABLET ORAL at 00:16

## 2022-08-08 RX ADMIN — PIPERACILLIN AND TAZOBACTAM 3375 MG: 3; .375 INJECTION, POWDER, LYOPHILIZED, FOR SOLUTION INTRAVENOUS at 01:36

## 2022-08-08 RX ADMIN — OXYCODONE AND ACETAMINOPHEN 1 TABLET: 5; 325 TABLET ORAL at 13:27

## 2022-08-08 RX ADMIN — ENOXAPARIN SODIUM 40 MG: 100 INJECTION SUBCUTANEOUS at 08:05

## 2022-08-08 RX ADMIN — SODIUM CHLORIDE, PRESERVATIVE FREE 10 ML: 5 INJECTION INTRAVENOUS at 02:05

## 2022-08-08 RX ADMIN — VANCOMYCIN HYDROCHLORIDE 1000 MG: 1 INJECTION, POWDER, LYOPHILIZED, FOR SOLUTION INTRAVENOUS at 15:31

## 2022-08-08 RX ADMIN — SODIUM CHLORIDE, PRESERVATIVE FREE 10 ML: 5 INJECTION INTRAVENOUS at 01:37

## 2022-08-08 ASSESSMENT — PAIN DESCRIPTION - ORIENTATION
ORIENTATION: LEFT

## 2022-08-08 ASSESSMENT — PAIN SCALES - GENERAL
PAINLEVEL_OUTOF10: 7
PAINLEVEL_OUTOF10: 8
PAINLEVEL_OUTOF10: 7
PAINLEVEL_OUTOF10: 8
PAINLEVEL_OUTOF10: 0
PAINLEVEL_OUTOF10: 8
PAINLEVEL_OUTOF10: 8
PAINLEVEL_OUTOF10: 0
PAINLEVEL_OUTOF10: 8

## 2022-08-08 ASSESSMENT — PAIN DESCRIPTION - PAIN TYPE
TYPE: SURGICAL PAIN

## 2022-08-08 ASSESSMENT — PAIN DESCRIPTION - LOCATION
LOCATION: FOOT

## 2022-08-08 ASSESSMENT — PAIN DESCRIPTION - FREQUENCY
FREQUENCY: CONTINUOUS
FREQUENCY: CONTINUOUS
FREQUENCY: INTERMITTENT

## 2022-08-08 ASSESSMENT — PAIN DESCRIPTION - DESCRIPTORS
DESCRIPTORS: ACHING
DESCRIPTORS: THROBBING;SHOOTING
DESCRIPTORS: ACHING;DISCOMFORT;THROBBING
DESCRIPTORS: ACHING;DISCOMFORT;THROBBING
DESCRIPTORS: ACHING;THROBBING;TENDER
DESCRIPTORS: BURNING;DISCOMFORT;SORE;THROBBING

## 2022-08-08 ASSESSMENT — PAIN DESCRIPTION - ONSET
ONSET: GRADUAL
ONSET: ON-GOING
ONSET: ON-GOING

## 2022-08-08 ASSESSMENT — PAIN - FUNCTIONAL ASSESSMENT: PAIN_FUNCTIONAL_ASSESSMENT: ACTIVITIES ARE NOT PREVENTED

## 2022-08-08 NOTE — CARE COORDINATION
8/8/2022 social work transition of care planning  Sw followed up with pt at bedside. Plan remains for home. Pt will call for a ride at discharge. [facility] following.     Electronically signed by COLLIN Alejandro on 8/9/2022 at 9:33 AM

## 2022-08-08 NOTE — PROGRESS NOTES
Infectious Disease  Progress Note  [facility]    Chief Complaint:left great toe pain    Subjective: left great toe amputation    Scheduled Meds:   sodium chloride flush  5-40 mL IntraVENous 2 times per day    vancomycin  1,000 mg IntraVENous Q12H    atorvastatin  40 mg Oral Nightly    enoxaparin  40 mg SubCUTAneous Daily     Continuous Infusions:   sodium chloride       PRN Meds:sodium chloride flush, sodium chloride, ondansetron **OR** ondansetron, polyethylene glycol, acetaminophen **OR** acetaminophen, melatonin, oxyCODONE-acetaminophen    Patient Vitals for the past 24 hrs:   BP Temp Temp src Pulse Resp SpO2 Weight   08/08/22 0940 127/64 97.6 °F (36.4 °C) Temporal 62 16 96 % --   08/08/22 0313 -- -- -- -- -- -- 131 lb (59.4 kg)   08/07/22 1952 (!) 146/65 (!) 95.6 °F (35.3 °C) Temporal 64 16 95 % --   08/07/22 1521 (!) 153/70 98.2 °F (36.8 °C) Temporal 70 17 92 % --       CBC with Differential:    Lab Results   Component Value Date/Time    WBC 8.4 08/06/2022 06:37 AM    RBC 5.06 08/06/2022 06:37 AM    HGB 15.6 08/06/2022 06:37 AM    HCT 45.1 08/06/2022 06:37 AM     08/06/2022 06:37 AM    MCV 89.1 08/06/2022 06:37 AM    MCH 30.8 08/06/2022 06:37 AM    MCHC 34.6 08/06/2022 06:37 AM    RDW 12.7 08/06/2022 06:37 AM    SEGSPCT 68 12/07/2010 02:36 PM    LYMPHOPCT 30.9 08/06/2022 06:37 AM    MONOPCT 5.5 08/06/2022 06:37 AM    BASOPCT 0.7 08/06/2022 06:37 AM    MONOSABS 0.46 08/06/2022 06:37 AM    LYMPHSABS 2.60 08/06/2022 06:37 AM    EOSABS 0.07 08/06/2022 06:37 AM    BASOSABS 0.06 08/06/2022 06:37 AM     CMP:    Lab Results   Component Value Date/Time     08/06/2022 06:37 AM    K 3.9 08/06/2022 06:37 AM     08/06/2022 06:37 AM    CO2 24 08/06/2022 06:37 AM    BUN 7 08/06/2022 06:37 AM    CREATININE 0.5 08/06/2022 06:37 AM    GFRAA >60 08/06/2022 06:37 AM    LABGLOM >60 08/06/2022 06:37 AM    GLUCOSE 102 08/06/2022 06:37 AM    GLUCOSE 105 12/07/2010 02:36 PM    PROT 7.0 08/06/2022 06:37 AM    LABALBU 3.5 08/06/2022 06:37 AM    LABALBU 4.7 12/07/2010 02:36 PM    CALCIUM 9.6 08/06/2022 06:37 AM    BILITOT 0.3 08/06/2022 06:37 AM    ALKPHOS 35 08/06/2022 06:37 AM    AST 11 08/06/2022 06:37 AM    ALT <5 08/06/2022 06:37 AM       /64   Pulse 62   Temp 97.6 °F (36.4 °C) (Temporal)   Resp 16   Wt 131 lb (59.4 kg)   SpO2 96%   BMI 17.77 kg/m²     Physical Exam  Const/Neuro- unchanged, no signs of acute distress, Alert  ENMT- Within Normal Limits, Normocephalic, mucous membranes pink/moist, No thrush  Neck: Neck supple  Heart- Regular, Rate, Rhythm- no murmur appreciated. Lungs- clear to ascultation. Respirations even and nonlabored. Abdomen- Soft, bowel sounds positive, non tender  Musculo/Extremities-  Equal and symmetrical, no edema. No tenderness. Skin:  Warm and dry, free from rashes. Cultures reviewed    Radiology reviewed  XR FOOT LEFT (2 VIEWS)   Final Result   Postsurgical changes related to amputation of the proximal and distal 1st   phalanges. VL LOWER EXTREMITY ARTERIAL SEGMENTAL PRESSURES W PPG BILATERAL   Final Result      US DUP LOWER EXTREMITIES BILATERAL VENOUS   Final Result   Within the visualized vessels there is no evidence for deep venous   thrombosis               XR TOE RIGHT (MIN 2 VIEWS)   Final Result   Addendum (preliminary) 1 of 1   ADDENDUM:   Correction to the examination type:      3 X-RAY VIEWS OF THE LEFT TOE      Clarification for the         Final   Great toe distal phalangeal tuft osteomyelitis. IMPRESSION:   Left great toe distal phalangeal tuft osteomyelitis.              Assessment    Left great toe osteo     Xray  great toe phalangeal tuft osteomyelitis    Peripheral vascular disease   Plan            Baseline ESR, CRP      Monitor labs    OR AMPUTATION   of first digit  saturday  Stop zosyn   heavy staph aureus  need sensitivities  Talked to          Electronically signed by Adithya Snyder MD on 8/8/2022 at 11:44 AM
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
## 2022-08-08 NOTE — PROGRESS NOTES
Occupational Therapy  OT BEDSIDE TREATMENT NOTE   [address] [facility] Radha Raienysherman [address]  [address] [address] [address]   Patient Name: Reena Mata  MRN: [id number]  : 1955  Room: [address] [place] [address]     Evaluating OT: Lucius Santillan OTR/L [id number]  Referring Provider:MD Mo  Specific Provider Orders: 22  Recommended Adaptive Equipment: tub seat       Diagnosis: osteomyelitis L great toe- s/p great toe amputation  Pertinent Medical History: HLD, PAD, peripheral neuropathy & eczema  Precautions:  Fall Risk, 50% weight bearing LLE with surgical shoe on     Assessment of current deficits  [x] Functional mobility           [x]ADLs           [] Strength                  []Cognition  [x] Functional transfers         [x] IADLs         [] Safety Awareness   [x]Endurance  [] Fine Coordination                         [x] Balance      [] Vision/perception   []Sensation     []Gross Motor Coordination             [] ROM           [] Delirium                   [] Motor Control     OT PLAN OF CARE   OT POC based on physician orders, patient diagnosis and results of clinical assessment     Frequency/Duration: 1-3 days/wk for 2 weeks PRN  Specific OT Treatment to include:   * Instruction/training on adapted ADL techniques and AE recommendations to increase functional independence within precautions       * Training on energy conservation strategies, correct breathing pattern and techniques to improve independence/tolerance for self-care routine  * Functional transfer/mobility training/DME recommendations for increased independence, safety, and fall prevention  * Patient/Family education to increase follow through with safety techniques and functional independence  * Recommendation of environmental modifications for increased safety with functional transfers/mobility and ADLs  * Therapeutic exercise to improve motor endurance, ROM, and functional strength for ADLs/functional transfers  * Therapeutic activities to facilitate/challenge dynamic balance, stand tolerance for increased safety and independence with ADLs     Modified [place] Scale (MRS)  Score     Description  0             No symptoms  1             No significant disability despite symptoms  2             Slight disability; able to look after own affairs  3             Moderate disability; able to ambulate without assist/ requires assist with ADLs  4             Moderate/Severe disability;requires assist to ambulate/assist with ADLs  5             Severe disability;bedridden/incontinent  6               Score:2     Home Living: Pt lives alone with her cat Dena Sample the man) in a 5th floor apartment that has a lsvel entry & elevator assess & laundry on  & uses elevator to assess  Bathroom setup: tub-curtain  Equipment owned: none  Prior Level of Function: independent with ADLs , independent with IADLs; using no device for functional mobility  Driving: yes  Occupation: retired on disability  Comments: Pt daughter & niece live close by      Pain Level: Pt did not c/o pain  Cognition: A&O: 4/4; Follows 2-3 step directions             Memory:  good-             Sequencing:  good -             Problem solving:  good-             Judgement/safety:  good-     Functional Assessment:  AM-PAC Daily Activity Raw Score:     Initial Eval Status  Date: 22 Treatment Status  Date: 22 STGs=LTGs  Time Frame: 3 days   Feeding independent  Ind     Grooming SBA to stand & wash her hands Sup  To wash hands standing at sink  independent   UB Dressing S/u Set up  independent   LB Dressing Min A- assist to don surgical shoe SBA  To don underwear seated EOB and standing to pull over hips. SBA to don surgical shoe with instructions on proper positioning.   Mod I   Bathing SBA-CGA- simulated SBA  Simulated seated and standing for posterior  Mod I   Toileting Sup Sup- clothing management  Mod I    Bed Mobility Supine <> sit: SBA Mod I- supine<->sit     Supine <> sit: independent      Functional Transfers Sit <> stand:SBA & vc's for hand placement     Stand pivot: SBA- rw &vc's for 50% weight bearing   Commode: SBA ambulating with a rw to a standard commode & min vc's for weight bearing restrictions & walker management Sup- sit<->stand  Cuing for hand placement  Sup- toilet transfer using grab bar  Mod I    Functional Mobility SBA functional mobility in her room & vc's for sequencing & weight bearing restrictions Sup  To and from bathroom using w/w  Mod I    Balance Sitting:    Static:  G-    Dynamic:F+  Standing: G- Sitting:    Static:  Ind    Dynamic: Ind  Standing: Sup  Sitting:  WFL- static & dynamic  Standing;  Static G  Dynamic G   Activity Tolerance F+ Good-  Pt demo G- endurance for a 20 minute functional activity   Visual/  Perceptual WFL   pt able to read clock on wall     visual tracking appears WFL                     Comments: Upon arrival pt supine in bed. Pt educated on techniques to increase independence and safety during ADL's, and functional transfers. Discussed home set up with pt, giving suggestions to increase safety at discharge. At end of session pt left seated EOB, call light within reach. Pt has made good progress towards set goals.      Continue with current plan of care    Treatment Time In: 11:07            Treatment Time Out: 11:18             Treatment Charges: Mins Units   Ther Ex  10179     Manual Therapy [id number]     Thera Activities [id number]     ADL/Home Mgt 83707 11 1   Neuro Re-ed [id number]     Group Therapy      Orthotic manage/training  [id number]     Non-Billable Time     Total Timed Treatment 11 1     Nilsa 162, Radha 86
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
## 2022-08-09 LAB
BLOOD CULTURE, ROUTINE: NORMAL
CULTURE SURGICAL: ABNORMAL
CULTURE SURGICAL: ABNORMAL
CULTURE, BLOOD 2: NORMAL
ORGANISM: ABNORMAL
ORGANISM: ABNORMAL

## 2022-08-09 PROCEDURE — 2580000003 HC RX 258

## 2022-08-09 PROCEDURE — 6370000000 HC RX 637 (ALT 250 FOR IP)

## 2022-08-09 PROCEDURE — 6360000002 HC RX W HCPCS: Performed by: INTERNAL MEDICINE

## 2022-08-09 PROCEDURE — 6360000002 HC RX W HCPCS

## 2022-08-09 PROCEDURE — 97530 THERAPEUTIC ACTIVITIES: CPT

## 2022-08-09 PROCEDURE — 1200000000 HC SEMI PRIVATE

## 2022-08-09 PROCEDURE — 2580000003 HC RX 258: Performed by: INTERNAL MEDICINE

## 2022-08-09 RX ORDER — SODIUM CHLORIDE 9 MG/ML
INJECTION, SOLUTION INTRAVENOUS PRN
Status: DISCONTINUED | OUTPATIENT
Start: 2022-08-09 | End: 2022-08-10 | Stop reason: HOSPADM

## 2022-08-09 RX ORDER — SODIUM CHLORIDE 0.9 % (FLUSH) 0.9 %
5-40 SYRINGE (ML) INJECTION EVERY 12 HOURS SCHEDULED
Status: DISCONTINUED | OUTPATIENT
Start: 2022-08-09 | End: 2022-08-10 | Stop reason: HOSPADM

## 2022-08-09 RX ORDER — HEPARIN SODIUM (PORCINE) LOCK FLUSH IV SOLN 100 UNIT/ML 100 UNIT/ML
3 SOLUTION INTRAVENOUS EVERY 12 HOURS SCHEDULED
Status: DISCONTINUED | OUTPATIENT
Start: 2022-08-09 | End: 2022-08-10 | Stop reason: HOSPADM

## 2022-08-09 RX ORDER — CEFAZOLIN SODIUM 1 G/3ML
1000 INJECTION, POWDER, FOR SOLUTION INTRAMUSCULAR; INTRAVENOUS EVERY 8 HOURS
Qty: 63 EACH | Refills: 0 | Status: SHIPPED | OUTPATIENT
Start: 2022-08-09 | End: 2022-08-19 | Stop reason: SDUPTHER

## 2022-08-09 RX ORDER — SODIUM CHLORIDE 0.9 % (FLUSH) 0.9 %
5-40 SYRINGE (ML) INJECTION PRN
Status: DISCONTINUED | OUTPATIENT
Start: 2022-08-09 | End: 2022-08-10 | Stop reason: HOSPADM

## 2022-08-09 RX ORDER — HEPARIN SODIUM (PORCINE) LOCK FLUSH IV SOLN 100 UNIT/ML 100 UNIT/ML
3 SOLUTION INTRAVENOUS PRN
Status: DISCONTINUED | OUTPATIENT
Start: 2022-08-09 | End: 2022-08-10 | Stop reason: HOSPADM

## 2022-08-09 RX ORDER — CEFAZOLIN SODIUM 1 G/3ML
1000 INJECTION, POWDER, FOR SOLUTION INTRAMUSCULAR; INTRAVENOUS EVERY 8 HOURS
Qty: 21 EACH | Refills: 0 | Status: SHIPPED | OUTPATIENT
Start: 2022-08-09 | End: 2022-08-30

## 2022-08-09 RX ORDER — LIDOCAINE HYDROCHLORIDE 10 MG/ML
5 INJECTION, SOLUTION EPIDURAL; INFILTRATION; INTRACAUDAL; PERINEURAL ONCE
Status: DISCONTINUED | OUTPATIENT
Start: 2022-08-09 | End: 2022-08-10 | Stop reason: HOSPADM

## 2022-08-09 RX ADMIN — ATORVASTATIN CALCIUM 40 MG: 40 TABLET, FILM COATED ORAL at 20:59

## 2022-08-09 RX ADMIN — OXYCODONE AND ACETAMINOPHEN 1 TABLET: 5; 325 TABLET ORAL at 13:18

## 2022-08-09 RX ADMIN — OXYCODONE AND ACETAMINOPHEN 1 TABLET: 5; 325 TABLET ORAL at 08:51

## 2022-08-09 RX ADMIN — CEFAZOLIN 1000 MG: 1 INJECTION, POWDER, FOR SOLUTION INTRAMUSCULAR; INTRAVENOUS at 14:48

## 2022-08-09 RX ADMIN — SODIUM CHLORIDE, PRESERVATIVE FREE 10 ML: 5 INJECTION INTRAVENOUS at 07:59

## 2022-08-09 RX ADMIN — ENOXAPARIN SODIUM 40 MG: 100 INJECTION SUBCUTANEOUS at 07:59

## 2022-08-09 RX ADMIN — OXYCODONE AND ACETAMINOPHEN 1 TABLET: 5; 325 TABLET ORAL at 17:12

## 2022-08-09 RX ADMIN — OXYCODONE AND ACETAMINOPHEN 1 TABLET: 5; 325 TABLET ORAL at 20:59

## 2022-08-09 RX ADMIN — VANCOMYCIN HYDROCHLORIDE 1000 MG: 1 INJECTION, POWDER, LYOPHILIZED, FOR SOLUTION INTRAVENOUS at 04:08

## 2022-08-09 RX ADMIN — MELATONIN 3 MG ORAL TABLET 3 MG: 3 TABLET ORAL at 20:58

## 2022-08-09 RX ADMIN — SODIUM CHLORIDE, PRESERVATIVE FREE 10 ML: 5 INJECTION INTRAVENOUS at 20:59

## 2022-08-09 RX ADMIN — CEFAZOLIN 1000 MG: 1 INJECTION, POWDER, FOR SOLUTION INTRAMUSCULAR; INTRAVENOUS at 23:59

## 2022-08-09 RX ADMIN — OXYCODONE AND ACETAMINOPHEN 1 TABLET: 5; 325 TABLET ORAL at 02:35

## 2022-08-09 RX ADMIN — SODIUM CHLORIDE, PRESERVATIVE FREE 10 ML: 5 INJECTION INTRAVENOUS at 00:00

## 2022-08-09 ASSESSMENT — PAIN DESCRIPTION - FREQUENCY
FREQUENCY: INTERMITTENT

## 2022-08-09 ASSESSMENT — PAIN DESCRIPTION - DESCRIPTORS
DESCRIPTORS: ACHING;DISCOMFORT;THROBBING
DESCRIPTORS: SHOOTING;THROBBING;STABBING
DESCRIPTORS: ACHING;DISCOMFORT;THROBBING
DESCRIPTORS: ACHING;DISCOMFORT;SORE

## 2022-08-09 ASSESSMENT — PAIN DESCRIPTION - ONSET
ONSET: GRADUAL
ONSET: GRADUAL
ONSET: ON-GOING

## 2022-08-09 ASSESSMENT — PAIN SCALES - GENERAL
PAINLEVEL_OUTOF10: 5
PAINLEVEL_OUTOF10: 8
PAINLEVEL_OUTOF10: 7
PAINLEVEL_OUTOF10: 8
PAINLEVEL_OUTOF10: 6
PAINLEVEL_OUTOF10: 4

## 2022-08-09 ASSESSMENT — PAIN DESCRIPTION - ORIENTATION
ORIENTATION: LEFT

## 2022-08-09 ASSESSMENT — PAIN DESCRIPTION - LOCATION
LOCATION: FOOT

## 2022-08-09 ASSESSMENT — PAIN DESCRIPTION - PAIN TYPE
TYPE: SURGICAL PAIN

## 2022-08-09 NOTE — DISCHARGE INSTRUCTIONS
[address] [place] [address] [facility]  ([address])  [address] [facility] [address]  [place], [address]  Phone [phone]   Fax [phone]        Jeison Ortez. Star Broderick MD, MD Betty Boo MD Oda Nice, MD José Joaquin, MD Florence Santana, RN, CNP      Mikayla Ga, RN, CNS      MD Agnieszka Antonio MD     STANDING ORDERS (ID Protocol)     Visiting nurses are to write the Primary Care Physician and their own call back number on all laboratory requisition forms. Abnormal lab values are called to the physician by the nurse and NOT by the laboratory. Fax all labs to the office in a timely manner, during office hours. All faxes should include nurses name and call back number. Vascular Access Devices or VADs (TLC, PICC, Midline, etc) will be replaced as necessary. Draw all blood work from VADs, except for drug levels. If unable to access a VAD, insert a peripheral catheter temporarily. Contact the Primary Care Physician or [facility] office for surgical referral.  Use tPA (Cayetano Shelton) as per agency protocol to restore patency of VAD. Remove VAD upon completion of IV antibiotics, unless otherwise specified by the ordering physician. If VAD cannot be removed, schedule appointment at office for removal.  Notify ordering physician or office if patient requires admission to the hospital with reason for admission. Discontinue all blood work upon completion of IV antibiotics, unless otherwise specified by ordering physician. Notify ordering physician if the patient does not receive the scheduled antibiotic for 24 hours or more. The Pharmacy and [address] [place] [address] may adjust the timing of the infusion and blood work to accommodate the patients home care conditions. When PICC or VAD is to be removed, documentation of length of inserted PICC.  PICC or VAD length is to correlate with inserted length and sent to physician at the time of removal.  Give the patient a list of antibiotics being administered with:  Drug name  Route  Frequency  Start/Stop date      ROUTINE LABS TO BE DRAWN/ORDERED:  Twice weekly (preferably every Monday & Thursday):  BUN Creatinine and liver panel  Complete Blood Count with differential (CBC with dif)  Once weekly:  C-Reactive Protein (not high sensitivity CRP)  Erythrocyte/Westergren Sedimentation Rate (WSR or ESR)  Total CPK for patients on Daptomycin (Cubicin®). Obtain CPK more often if the patient is experiencing muscle weakness or myalgias. Clinical Pharmacist is to adjust Vancomycin and Aminoglycosides unless otherwise indicated. Draw Vancomycin trough 30 minutes before the third dose  After starting drug, or   After the dosing or interval is changed. If the trough level is between 5 and 20 continue dose as ordered. Draw troughs twice weekly thereafter until troughs are stable (i.e. until trough is between 5 and 20 mcg/ml for two consecutive laboratory values). Once stable check troughs once weekly or every third dose. Please do not call physician unless the trough is < 5 or >20. If the trough is <20 continue dosing as ordered. If the trough is >20 call the office for further orders. Do not hold the dose while waiting for the trough result. Amingoglycosides (e.g. Gentamicin, Tobramycin and Amikacin) peaks and troughs should be drawn twice weekly (preferably on Mondays and Thursdays) or every third dose. Aminoglycoside peaks are not to be drawn if patient on Once-Daily Dosing (ODD). Call physician or office if the trough is:  >1 for gentamicin,   >2 for tobramycin, or   >5 for amikacin  When clinically indicated obtain:  Urine culture. If the patient has a fever with purulent drainage from Oglesby or suprapubic catheter, or foul smelling urine. Do not irrigate a clogged Oglesby catheter. Replace it.   Blood cultures and Wound Gram stain with culture & sensitivity. If the patient has a fever or increasing drainage or foul odor from a wound. Notify the treating physician in a timely manner  Stool specimen. If diarrhea occurs while on antibiotics, send stools for C. difficile and WBCs. When a drug is discontinued due to a low white blood cell count (WBCs) draw two consecutive CBC with differential and CMP. ALLERGIC OR ADVERSE REACTIONS TO MEDICATIONS  Mild reaction: (itching, with or without rash):  Administer Benadryl 50mg po x 1, then 25mg po q6h prn. Notify office or physician in a timely matter. Moderate reaction (itching with or without rash and/or wheezing, dyspnea, itchy throat):  Administer Benadryl 50 mg IV push x 1. Notify office or physician in a timely manner. Severe reaction i.e. Anaphylaxis (wheezing or stidor, sudden rash, lightheadedness, hypotension):  Administer epinephrine subcutaneous 0.3mg (1:1000) x 1 dose. May repeat twice every 5 minutes if needed. Call EMS and notify office or physician immediately. For all above reactions: administer Solu-Cortef 100mg slow IV push x 1. VASCULAR ACCESS DRESSING CHANGE PROTOCOL  Cleanse insertion site with Shur-Clens® or equivalent three times. Secure catheter with Steri-Strips®, Bone®, or equivalent securing device. Apply Opsite® 3000 or equivalent transparent dressing. Change dressing twice weekly, maintaining sterile technique. If there is a BioPatch®, SilverSite® or equivalent, change once weekly only or as needed. FOLLOW-UP VISITS  Nursing staff should call the office during business hours to schedule a follow-up appointment once the patient is admitted to the service or facility. Every effort should be made to have patient follow-up within 2 weeks of discharge. Continue IV antibiotic therapy until patient is seen in the office or unless specific stop date is noted on the original order or unless otherwise ordered by physician.   Call office to ensure stop date is correct before stopping antibiotics. LEFT FOOT DRESSING CHANGE. APPLY ADAPITC 4X4 KERLIX THEN WRAP WITH ACE DRESSING CHANGE EVERY OTHER DAY    Tess Galvan. MD Chaz Velasquez MD Walden Molt, MD Evelyn Boos, MD Munir P. Page Bump, MD  Two weeks to see ID
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
## 2022-08-09 NOTE — PROGRESS NOTES
4.7 12/07/2010 02:36 PM    CALCIUM 9.6 08/06/2022 06:37 AM    BILITOT 0.3 08/06/2022 06:37 AM    ALKPHOS 35 08/06/2022 06:37 AM    AST 11 08/06/2022 06:37 AM    ALT <5 08/06/2022 06:37 AM       BP (!) 156/75   Pulse 63   Temp 97.2 °F (36.2 °C) (Temporal)   Resp 16   Wt 131 lb (59.4 kg)   SpO2 98%   BMI 17.77 kg/m²     Physical Exam  Const/Neuro- unchanged, no signs of acute distress, Alert  ENMT- Within Normal Limits, Normocephalic, mucous membranes pink/moist, No thrush  Neck: Neck supple  Heart- Regular, Rate, Rhythm- no murmur appreciated. Lungs- clear to ascultation. Respirations even and nonlabored. Abdomen- Soft, bowel sounds positive, non tender  Musculo/Extremities-  Equal and symmetrical, no edema. No tenderness. Skin:  Warm and dry, free from rashes. Cultures reviewed    Radiology reviewed  XR FOOT LEFT (2 VIEWS)   Final Result   Postsurgical changes related to amputation of the proximal and distal 1st   phalanges. VL LOWER EXTREMITY ARTERIAL SEGMENTAL PRESSURES W PPG BILATERAL   Final Result      US DUP LOWER EXTREMITIES BILATERAL VENOUS   Final Result   Within the visualized vessels there is no evidence for deep venous   thrombosis               XR TOE RIGHT (MIN 2 VIEWS)   Final Result   Addendum (preliminary) 1 of 1   ADDENDUM:   Correction to the examination type:      3 X-RAY VIEWS OF THE LEFT TOE      Clarification for the         Final   Great toe distal phalangeal tuft osteomyelitis. IMPRESSION:   Left great toe distal phalangeal tuft osteomyelitis.              Assessment    Left great toe osteo     Xray  great toe phalangeal tuft osteomyelitis    Peripheral vascular disease   Plan            Baseline ESR, CRP      Monitor labs    OR AMPUTATION   of first digit  saturday  Talk[facility]    [facility]    followup with ID  Cultures staph aureus MSSA   alpha hemolytic strep      Electronically signed by Fran Salmon MD on 8/9/2022 at 12:19 PM
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
## 2022-08-09 NOTE — PROGRESS NOTES
Occupational Therapy  OT BEDSIDE TREATMENT NOTE   [address] [address] [facility]  [address] [place] [address]  Patient Name: Tanika Dalton  MRN: [id number]  : 1955  Room: A     Evaluating OT: Tony Castano OTR/L [id number]  Referring Provider:MD Mo  Specific Provider Orders: 22  Recommended Adaptive Equipment: tub seat       Diagnosis: osteomyelitis L great toe- s/p great toe amputation  Pertinent Medical History: HLD, PAD, peripheral neuropathy & eczema  Precautions:  Fall Risk, 50% weight bearing LLE with surgical shoe on     Assessment of current deficits  [x] Functional mobility           [x]ADLs           [] Strength                  []Cognition  [x] Functional transfers         [x] IADLs         [] Safety Awareness   [x]Endurance  [] Fine Coordination                         [x] Balance      [] Vision/perception   []Sensation     []Gross Motor Coordination             [] ROM           [] Delirium                   [] Motor Control     OT PLAN OF CARE   OT POC based on physician orders, patient diagnosis and results of clinical assessment     Frequency/Duration: 1-3 days/wk for 2 weeks PRN  Specific OT Treatment to include:   * Instruction/training on adapted ADL techniques and AE recommendations to increase functional independence within precautions       * Training on energy conservation strategies, correct breathing pattern and techniques to improve independence/tolerance for self-care routine  * Functional transfer/mobility training/DME recommendations for increased independence, safety, and fall prevention  * Patient/Family education to increase follow through with safety techniques and functional independence  * Recommendation of environmental modifications for increased safety with functional transfers/mobility and ADLs  * Therapeutic exercise to improve motor endurance, ROM, and functional strength for ADLs/functional transfers  * Therapeutic activities to facilitate/challenge dynamic balance, stand tolerance for increased safety and independence with ADLs     Modified Cochran Scale (MRS)  Score     Description  0             No symptoms  1             No significant disability despite symptoms  2             Slight disability; able to look after own affairs  3             Moderate disability; able to ambulate without assist/ requires assist with ADLs  4             Moderate/Severe disability;requires assist to ambulate/assist with ADLs  5             Severe disability;bedridden/incontinent  6               Score:2     Home Living: Pt lives alone with her cat Aristeo Bhandari the man) in a 5th floor apartment that has a lsvel entry & elevator assess & laundry on  & uses elevator to assess  Bathroom setup: tub-curtain  Equipment owned: none  Prior Level of Function: independent with ADLs , independent with IADLs; using no device for functional mobility  Driving: yes  Occupation: retired on disability  Comments: Pt daughter & niece live close by      Pain Level: Pt did not c/o pain  Cognition: A&O: 4/4; Follows 2-3 step directions             Memory:  good             Sequencing:  good              Problem solving:  good             Judgement/safety:  good     Functional Assessment:  AM-PAC Daily Activity Raw Score: 23/24    Initial Eval Status  Date: 22 Treatment Status  Date: 22 STGs=LTGs  Time Frame: 3 days   Feeding independent  Ind     Grooming SBA to stand & wash her hands Ind  To wash hands standing at sink  independent   UB Dressing S/u Set up  independent   LB Dressing Min A- assist to don surgical shoe Mod I   to don surgical shoe seated EOB  Mod I   Bathing SBA-CGA- simulated Sup  Simulated seated and standing for posterior  Mod I   Toileting Sup Mod I- clothing management  Mod I    Bed Mobility Supine <> sit: SBA    Mod I- supine<->sit     Supine <> sit: independent      Functional Transfers Sit <> stand:SBA & vc's for hand placement     Stand pivot: SBA- rw &vc's for 50% weight bearing   Commode: SBA ambulating with a rw to a standard commode & min vc's for weight bearing restrictions & walker management Mod I- sit<->stand  Cuing for hand placement  Mod I- toilet transfer using grab bar  Mod I    Functional Mobility SBA functional mobility in her room & vc's for sequencing & weight bearing restrictions Sup  To and from bathroom using w/w  Mod I    Balance Sitting:    Static:  G-    Dynamic:F+  Standing: G- Sitting:    Static:  Ind    Dynamic: Ind  Standing: Ind Sitting:  WFL- static & dynamic  Standing;  Static G  Dynamic G   Activity Tolerance F+ Good Pt demo G- endurance for a 20 minute functional activity   Visual/  Perceptual WFL   pt able to read clock on wall     visual tracking appears WFL                     Comments: Upon arrival pt supine in bed. Pt educated on techniques to increase independence and safety during ADL's. Discussed home set up with pt, giving suggestions to increase safety at discharge. At end of session pt left seated EOB, call light within reach. Pt has made good progress towards set goals.      Continue with current plan of care    Treatment Time In: 10:41            Treatment Time Out: 10:51             Treatment Charges: Mins Units   Ther Ex  58635     Manual Therapy 45475     Thera Activities 60687     ADL/Home Mgt 67849 10 1   Neuro Re-ed 91812     Group Therapy      Orthotic manage/training  76322     Non-Billable Time     Total Timed Treatment 10 [address] [address]

## 2022-08-09 NOTE — CARE COORDINATION
8/9/2022 social work transition of care planning  Pt plan is home with [employer]. Awaiting ID final plan for iv atb. Electronically signed by COLLIN Coulter on 8/9/2022 at 9:34 AM    Addendum: Awaiting updated rx to fax to [facility] [phone] for pricing. [facility] can see tomorrow afternoon. Pt will need morning dose(if pt agrees to pricing for iv atb). Pt will need picc line. Sw will update pt's niece via phone.   Electronically signed by COLLIN Coulter on 8/9/2022 at 3:06 PM
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
## 2022-08-10 ENCOUNTER — APPOINTMENT (OUTPATIENT)
Dept: GENERAL RADIOLOGY | Age: 67
DRG: 504 | End: 2022-08-10
Payer: MEDICARE

## 2022-08-10 VITALS
HEART RATE: 66 BPM | RESPIRATION RATE: 18 BRPM | DIASTOLIC BLOOD PRESSURE: 75 MMHG | TEMPERATURE: 97.4 F | WEIGHT: 131 LBS | OXYGEN SATURATION: 99 % | SYSTOLIC BLOOD PRESSURE: 138 MMHG | BODY MASS INDEX: 17.77 KG/M2

## 2022-08-10 PROCEDURE — 6360000002 HC RX W HCPCS: Performed by: INTERNAL MEDICINE

## 2022-08-10 PROCEDURE — 71045 X-RAY EXAM CHEST 1 VIEW: CPT

## 2022-08-10 PROCEDURE — 2580000003 HC RX 258: Performed by: INTERNAL MEDICINE

## 2022-08-10 PROCEDURE — C1751 CATH, INF, PER/CENT/MIDLINE: HCPCS

## 2022-08-10 PROCEDURE — 6370000000 HC RX 637 (ALT 250 FOR IP)

## 2022-08-10 PROCEDURE — 02HV33Z INSERTION OF INFUSION DEVICE INTO SUPERIOR VENA CAVA, PERCUTANEOUS APPROACH: ICD-10-PCS | Performed by: FAMILY MEDICINE

## 2022-08-10 PROCEDURE — 36569 INSJ PICC 5 YR+ W/O IMAGING: CPT

## 2022-08-10 PROCEDURE — 76937 US GUIDE VASCULAR ACCESS: CPT

## 2022-08-10 PROCEDURE — 6360000002 HC RX W HCPCS

## 2022-08-10 RX ORDER — ATORVASTATIN CALCIUM 40 MG/1
40 TABLET, FILM COATED ORAL NIGHTLY
Qty: 30 TABLET | Refills: 3 | Status: SHIPPED | OUTPATIENT
Start: 2022-08-10 | End: 2022-08-19 | Stop reason: SDUPTHER

## 2022-08-10 RX ORDER — OXYCODONE HYDROCHLORIDE AND ACETAMINOPHEN 5; 325 MG/1; MG/1
1 TABLET ORAL EVERY 4 HOURS PRN
Qty: 15 TABLET | Refills: 0 | Status: SHIPPED | OUTPATIENT
Start: 2022-08-10 | End: 2022-08-13

## 2022-08-10 RX ORDER — POLYETHYLENE GLYCOL 3350 17 G/17G
17 POWDER, FOR SOLUTION ORAL DAILY PRN
Qty: 30 EACH | Refills: 0 | Status: SHIPPED | OUTPATIENT
Start: 2022-08-10 | End: 2022-08-19

## 2022-08-10 RX ADMIN — OXYCODONE AND ACETAMINOPHEN 1 TABLET: 5; 325 TABLET ORAL at 13:34

## 2022-08-10 RX ADMIN — CEFAZOLIN 1000 MG: 1 INJECTION, POWDER, FOR SOLUTION INTRAMUSCULAR; INTRAVENOUS at 08:07

## 2022-08-10 RX ADMIN — OXYCODONE AND ACETAMINOPHEN 1 TABLET: 5; 325 TABLET ORAL at 09:51

## 2022-08-10 RX ADMIN — ENOXAPARIN SODIUM 40 MG: 100 INJECTION SUBCUTANEOUS at 08:07

## 2022-08-10 RX ADMIN — HEPARIN 300 UNITS: 100 SYRINGE at 13:34

## 2022-08-10 RX ADMIN — SODIUM CHLORIDE, PRESERVATIVE FREE 10 ML: 5 INJECTION INTRAVENOUS at 00:12

## 2022-08-10 RX ADMIN — OXYCODONE AND ACETAMINOPHEN 1 TABLET: 5; 325 TABLET ORAL at 01:21

## 2022-08-10 RX ADMIN — SODIUM CHLORIDE, PRESERVATIVE FREE 10 ML: 5 INJECTION INTRAVENOUS at 08:07

## 2022-08-10 RX ADMIN — CEFAZOLIN 1000 MG: 1 INJECTION, POWDER, FOR SOLUTION INTRAMUSCULAR; INTRAVENOUS at 16:14

## 2022-08-10 RX ADMIN — OXYCODONE AND ACETAMINOPHEN 1 TABLET: 5; 325 TABLET ORAL at 05:34

## 2022-08-10 ASSESSMENT — PAIN DESCRIPTION - LOCATION
LOCATION: FOOT

## 2022-08-10 ASSESSMENT — PAIN DESCRIPTION - DESCRIPTORS
DESCRIPTORS: ACHING;DISCOMFORT;SORE
DESCRIPTORS: ACHING;DISCOMFORT;SHARP
DESCRIPTORS: ACHING;DISCOMFORT;SHOOTING;THROBBING
DESCRIPTORS: ACHING;DISCOMFORT;SORE

## 2022-08-10 ASSESSMENT — PAIN DESCRIPTION - ONSET
ONSET: GRADUAL
ONSET: GRADUAL
ONSET: ON-GOING
ONSET: ON-GOING

## 2022-08-10 ASSESSMENT — PAIN SCALES - GENERAL
PAINLEVEL_OUTOF10: 6
PAINLEVEL_OUTOF10: 7
PAINLEVEL_OUTOF10: 7
PAINLEVEL_OUTOF10: 4
PAINLEVEL_OUTOF10: 6
PAINLEVEL_OUTOF10: 3
PAINLEVEL_OUTOF10: 5

## 2022-08-10 ASSESSMENT — PAIN DESCRIPTION - PAIN TYPE
TYPE: SURGICAL PAIN

## 2022-08-10 ASSESSMENT — PAIN DESCRIPTION - FREQUENCY
FREQUENCY: INTERMITTENT

## 2022-08-10 ASSESSMENT — PAIN DESCRIPTION - ORIENTATION
ORIENTATION: LEFT
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
## 2022-08-10 NOTE — CARE COORDINATION
8/10/2022 social work transition of care planning  Pt plan remains for home, family will transport. [place] [facility] to see for afternoon dose. Awaiting picc line. Sw updated option care.   Electronically signed by COLLIN Bradley on 8/10/2022 at 10:12 AM

## 2022-08-10 NOTE — PROGRESS NOTES
CLINICAL PHARMACY NOTE: MEDS TO BEDS    Total # of Prescriptions Filled: 3   The following medications were delivered to the patient:  Miralax 3350 powder  Lipitor 40mg  Percocet 5-325mg    Additional Documentation:  Delivered to patient 8/10/22 @2:35pm

## 2022-08-10 NOTE — PROGRESS NOTES
Infectious Disease  Progress Note  [facility]    Chief Complaint:left great toe pain    Subjective: left great toe amputation    Scheduled Meds:   ceFAZolin  1,000 mg IntraVENous Q8H    lidocaine PF  5 mL IntraDERmal Once    sodium chloride flush  5-40 mL IntraVENous 2 times per day    heparin flush  3 mL IntraVENous 2 times per day    sodium chloride flush  5-40 mL IntraVENous 2 times per day    atorvastatin  40 mg Oral Nightly    enoxaparin  40 mg SubCUTAneous Daily     Continuous Infusions:   sodium chloride      sodium chloride       PRN Meds:sodium chloride flush, sodium chloride, heparin flush, sodium chloride flush, sodium chloride, ondansetron **OR** ondansetron, polyethylene glycol, acetaminophen **OR** acetaminophen, melatonin, oxyCODONE-acetaminophen    Patient Vitals for the past 24 hrs:   BP Temp Temp src Pulse Resp SpO2   08/10/22 0753 138/75 97.4 °F (36.3 °C) Temporal 66 18 99 %   08/10/22 0534 -- -- -- -- 16 --   08/10/22 0121 -- -- -- -- 15 --   08/09/22 2059 -- -- -- -- 16 --   08/09/22 2037 (!) 168/80 98 °F (36.7 °C) Temporal 63 17 94 %   08/09/22 1712 -- -- -- -- 16 --   08/09/22 1600 (!) 150/72 97.5 °F (36.4 °C) Temporal 60 17 98 %       CBC with Differential:    Lab Results   Component Value Date/Time    WBC 8.4 08/06/2022 06:37 AM    RBC 5.06 08/06/2022 06:37 AM    HGB 15.6 08/06/2022 06:37 AM    HCT 45.1 08/06/2022 06:37 AM     08/06/2022 06:37 AM    MCV 89.1 08/06/2022 06:37 AM    MCH 30.8 08/06/2022 06:37 AM    MCHC 34.6 08/06/2022 06:37 AM    RDW 12.7 08/06/2022 06:37 AM    SEGSPCT 68 12/07/2010 02:36 PM    LYMPHOPCT 30.9 08/06/2022 06:37 AM    MONOPCT 5.5 08/06/2022 06:37 AM    BASOPCT 0.7 08/06/2022 06:37 AM    MONOSABS 0.46 08/06/2022 06:37 AM    LYMPHSABS 2.60 08/06/2022 06:37 AM    EOSABS 0.07 08/06/2022 06:37 AM    BASOSABS 0.06 08/06/2022 06:37 AM     CMP:    Lab Results   Component Value Date/Time     08/06/2022 06:37 AM    K 3.9 08/06/2022 06:37 AM     08/06/2022 06:37 AM    CO2 24 08/06/2022 06:37 AM    BUN 7 08/06/2022 06:37 AM    CREATININE 0.5 08/06/2022 06:37 AM    GFRAA >60 08/06/2022 06:37 AM    LABGLOM >60 08/06/2022 06:37 AM    GLUCOSE 102 08/06/2022 06:37 AM    GLUCOSE 105 12/07/2010 02:36 PM    PROT 7.0 08/06/2022 06:37 AM    LABALBU 3.5 08/06/2022 06:37 AM    LABALBU 4.7 12/07/2010 02:36 PM    CALCIUM 9.6 08/06/2022 06:37 AM    BILITOT 0.3 08/06/2022 06:37 AM    ALKPHOS 35 08/06/2022 06:37 AM    AST 11 08/06/2022 06:37 AM    ALT <5 08/06/2022 06:37 AM       /75   Pulse 66   Temp 97.4 °F (36.3 °C) (Temporal)   Resp 18   Wt 131 lb (59.4 kg)   SpO2 99%   BMI 17.77 kg/m²     Physical Exam  Const/Neuro- unchanged, no signs of acute distress, Alert  ENMT- Within Normal Limits, Normocephalic, mucous membranes pink/moist, No thrush  Neck: Neck supple  Heart- Regular, Rate, Rhythm- no murmur appreciated. Lungs- clear to ascultation. Respirations even and nonlabored. Abdomen- Soft, bowel sounds positive, non tender  Musculo/Extremities-  Equal and symmetrical, no edema. No tenderness. Skin:  Warm and dry, free from rashes. Cultures reviewed    Radiology reviewed  XR FOOT LEFT (2 VIEWS)   Final Result   Postsurgical changes related to amputation of the proximal and distal 1st   phalanges. VL LOWER EXTREMITY ARTERIAL SEGMENTAL PRESSURES W PPG BILATERAL   Final Result      US DUP LOWER EXTREMITIES BILATERAL VENOUS   Final Result   Within the visualized vessels there is no evidence for deep venous   thrombosis               XR TOE RIGHT (MIN 2 VIEWS)   Final Result   Addendum (preliminary) 1 of 1   ADDENDUM:   Correction to the examination type:      3 X-RAY VIEWS OF THE LEFT TOE      Clarification for the         Final   Great toe distal phalangeal tuft osteomyelitis. IMPRESSION:   Left great toe distal phalangeal tuft osteomyelitis.          XR CHEST PORTABLE    (Results Pending)       Assessment    Left great toe osteo     Xray  great toe phalangeal tuft osteomyelitis    Peripheral vascular disease   Plan            Baseline ESR, CRP      Monitor labs    OR AMPUTATION   of first digit  saturday  Talked to    Home on 21 days of ancef    followup with ID  Cultures staph aureus MSSA   alpha hemolytic strep  Hopefully home today on ancef   I have written the scripts twice to the dissatisfaction of home health  they will call the office      Electronically signed by Isaias Martinez MD on 8/10/2022 at 10:44 AM

## 2022-08-10 NOTE — PROGRESS NOTES
Power picc line  Placement 8/10/2022    Product number: [id number]   Lot Number: [id number]      Ultrasound: yes   Left Brachial vein:                Upper Arm Circumference: 24cm    Size: 5.5frdl    Exposed Length: 1cm    Internal Length: 37cm   Cut: 17cm   Vein Measurement: 0.58cm    Crow Gomez RN  8/10/2022  10:32 AM      Power picc line placed with vps tip conformation.  Tip in lower 1/3 svc/caj
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
## 2022-08-19 PROBLEM — I87.2 CHRONIC VENOUS INSUFFICIENCY: Status: RESOLVED | Noted: 2019-03-21 | Resolved: 2022-08-19

## 2022-08-19 PROBLEM — M43.06 LUMBAR SPONDYLOLYSIS: Status: ACTIVE | Noted: 2022-08-19

## 2022-08-19 PROBLEM — E44.0 MODERATE PROTEIN-CALORIE MALNUTRITION (HCC): Status: ACTIVE | Noted: 2022-08-19

## 2022-08-19 PROBLEM — S98.112A AMPUTATION OF LEFT GREAT TOE (HCC): Status: ACTIVE | Noted: 2022-08-19

## 2022-08-19 PROBLEM — F11.90 CHRONIC, CONTINUOUS USE OF OPIOIDS: Status: ACTIVE | Noted: 2022-08-19

## 2022-08-19 PROBLEM — R09.89 DECREASED DORSALIS PEDIS PULSE: Status: RESOLVED | Noted: 2019-03-21 | Resolved: 2022-08-19

## 2022-08-19 PROBLEM — M86.172 OSTEOMYELITIS OF ANKLE OR FOOT, ACUTE, LEFT (HCC): Status: ACTIVE | Noted: 2022-08-19

## 2022-11-15 ENCOUNTER — OFFICE VISIT (OUTPATIENT)
Dept: PODIATRY | Age: 67
End: 2022-11-15
Payer: MEDICARE

## 2022-11-15 ENCOUNTER — TELEPHONE (OUTPATIENT)
Dept: PODIATRY | Age: 67
End: 2022-11-15

## 2022-11-15 VITALS
WEIGHT: 133 LBS | SYSTOLIC BLOOD PRESSURE: 118 MMHG | DIASTOLIC BLOOD PRESSURE: 78 MMHG | TEMPERATURE: 97.4 F | BODY MASS INDEX: 18.04 KG/M2

## 2022-11-15 DIAGNOSIS — M79.672 PAIN IN LEFT FOOT: ICD-10-CM

## 2022-11-15 DIAGNOSIS — L40.9 PSORIASIS (A TYPE OF SKIN INFLAMMATION): ICD-10-CM

## 2022-11-15 DIAGNOSIS — E11.42 DIABETIC POLYNEUROPATHY ASSOCIATED WITH TYPE 2 DIABETES MELLITUS (HCC): ICD-10-CM

## 2022-11-15 DIAGNOSIS — S98.112A AMPUTATION OF LEFT GREAT TOE (HCC): ICD-10-CM

## 2022-11-15 DIAGNOSIS — M86.072 ACUTE HEMATOGENOUS OSTEOMYELITIS OF LEFT FOOT (HCC): ICD-10-CM

## 2022-11-15 DIAGNOSIS — M86.172 OSTEOMYELITIS OF ANKLE OR FOOT, ACUTE, LEFT (HCC): ICD-10-CM

## 2022-11-15 DIAGNOSIS — M86.072 ACUTE HEMATOGENOUS OSTEOMYELITIS OF LEFT FOOT (HCC): Primary | ICD-10-CM

## 2022-11-15 LAB
ANION GAP SERPL CALCULATED.3IONS-SCNC: 14 MMOL/L (ref 7–16)
BASOPHILS ABSOLUTE: 0.07 E9/L (ref 0–0.2)
BASOPHILS RELATIVE PERCENT: 0.5 % (ref 0–2)
BUN BLDV-MCNC: 7 MG/DL (ref 6–23)
CALCIUM SERPL-MCNC: 10.3 MG/DL (ref 8.6–10.2)
CHLORIDE BLD-SCNC: 103 MMOL/L (ref 98–107)
CO2: 27 MMOL/L (ref 22–29)
CREAT SERPL-MCNC: 0.5 MG/DL (ref 0.5–1)
EOSINOPHILS ABSOLUTE: 0.08 E9/L (ref 0.05–0.5)
EOSINOPHILS RELATIVE PERCENT: 0.6 % (ref 0–6)
GFR SERPL CREATININE-BSD FRML MDRD: >60 ML/MIN/1.73
GLUCOSE BLD-MCNC: 93 MG/DL (ref 74–99)
HCT VFR BLD CALC: 48.7 % (ref 34–48)
HEMOGLOBIN: 16.2 G/DL (ref 11.5–15.5)
IMMATURE GRANULOCYTES #: 0.04 E9/L
IMMATURE GRANULOCYTES %: 0.3 % (ref 0–5)
LYMPHOCYTES ABSOLUTE: 3.95 E9/L (ref 1.5–4)
LYMPHOCYTES RELATIVE PERCENT: 28.9 % (ref 20–42)
MCH RBC QN AUTO: 29.9 PG (ref 26–35)
MCHC RBC AUTO-ENTMCNC: 33.3 % (ref 32–34.5)
MCV RBC AUTO: 90 FL (ref 80–99.9)
MONOCYTES ABSOLUTE: 0.78 E9/L (ref 0.1–0.95)
MONOCYTES RELATIVE PERCENT: 5.7 % (ref 2–12)
NEUTROPHILS ABSOLUTE: 8.73 E9/L (ref 1.8–7.3)
NEUTROPHILS RELATIVE PERCENT: 64 % (ref 43–80)
PDW BLD-RTO: 14.4 FL (ref 11.5–15)
PLATELET # BLD: 347 E9/L (ref 130–450)
PMV BLD AUTO: 10.8 FL (ref 7–12)
POTASSIUM SERPL-SCNC: 3.5 MMOL/L (ref 3.5–5)
RBC # BLD: 5.41 E12/L (ref 3.5–5.5)
SEDIMENTATION RATE, ERYTHROCYTE: 19 MM/HR (ref 0–20)
SODIUM BLD-SCNC: 144 MMOL/L (ref 132–146)
WBC # BLD: 13.7 E9/L (ref 4.5–11.5)

## 2022-11-15 PROCEDURE — 3044F HG A1C LEVEL LT 7.0%: CPT | Performed by: PODIATRIST

## 2022-11-15 PROCEDURE — 1123F ACP DISCUSS/DSCN MKR DOCD: CPT | Performed by: PODIATRIST

## 2022-11-15 PROCEDURE — 99204 OFFICE O/P NEW MOD 45 MIN: CPT | Performed by: PODIATRIST

## 2022-11-15 RX ORDER — PREGABALIN 75 MG/1
75 CAPSULE ORAL 3 TIMES DAILY
Qty: 180 CAPSULE | Refills: 0 | Status: SHIPPED | OUTPATIENT
Start: 2022-11-15 | End: 2023-01-14

## 2022-11-15 RX ORDER — DULOXETIN HYDROCHLORIDE 30 MG/1
30 CAPSULE, DELAYED RELEASE ORAL DAILY
Qty: 30 CAPSULE | Refills: 3 | Status: SHIPPED | OUTPATIENT
Start: 2022-11-15

## 2022-11-15 RX ORDER — AMOXICILLIN AND CLAVULANATE POTASSIUM 500; 125 MG/1; MG/1
1 TABLET, FILM COATED ORAL 2 TIMES DAILY
Qty: 28 TABLET | Refills: 0 | Status: ON HOLD
Start: 2022-11-15 | End: 2022-11-18 | Stop reason: HOSPADM
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
## 2022-11-15 NOTE — PATIENT INSTRUCTIONS
[facility] 11/25/22 at [facility] [address] [place] [place] [address]   They will contact you to give you all your preop instructions  Do not eat or drink anything after midnight on 11/24/2022 nothing morning of surgery  Call Mago Salgado if any issues [phone]  Call Dr. Landon Han and set up a preop clearance history and physical appointment ASAP  He needs to see you and clear you in his office note no special paperwork needs filled out  If EKG and labs are done we leave that up to Dr. Landon Han

## 2022-11-15 NOTE — TELEPHONE ENCOUNTER
Prior Authorization Form:      DEMOGRAPHICS:                     Patient Name:  Alejandro Gee  Patient :  1955            Insurance:  Payor: Valentin Anchors / Plan: Chelsey Breaker / Product Type: *No Product type* /   Insurance ID Number:    Payer/Plan Subscr  Sex Relation Sub. Ins. ID Effective Group Num   1. [id number] 1955 Female Self [id number] [facility]/15 [facility]                                    [id number]   2.  [place] -* Cynthia [facility] 1955 Female Self [id number] 22                                    [address]         DIAGNOSIS & PROCEDURE:                       Procedure/Operation: amputation second digit left foot          CPT Code:99484    Diagnosis:  osteomyelitis second digit left foot     ICD10 Code: m86.072    Location:  second digit left foot    Surgeon:  dr Marianna zamudio    SCHEDULING INFORMATION:                          Date: 22  Time: 1pm             Anesthesia:  lmac                                                       Status:  Outpatient        Special Comments:      Electronically signed by Kathie Hamm LPN on  at 3:46 PM

## 2022-11-16 ENCOUNTER — PREP FOR PROCEDURE (OUTPATIENT)
Dept: PODIATRY | Age: 67
End: 2022-11-16

## 2022-11-16 ENCOUNTER — HOSPITAL ENCOUNTER (INPATIENT)
Age: 67
LOS: 2 days | Discharge: HOME | DRG: 617 | End: 2022-11-18
Attending: EMERGENCY MEDICINE | Admitting: INTERNAL MEDICINE
Payer: MEDICARE

## 2022-11-16 ENCOUNTER — ANESTHESIA EVENT (OUTPATIENT)
Dept: OPERATING ROOM | Age: 67
DRG: 617 | End: 2022-11-16
Payer: MEDICARE

## 2022-11-16 DIAGNOSIS — R52 PAIN: ICD-10-CM

## 2022-11-16 DIAGNOSIS — S98.132A: ICD-10-CM

## 2022-11-16 DIAGNOSIS — L08.9 LEFT FOOT INFECTION: Primary | ICD-10-CM

## 2022-11-16 PROBLEM — E11.628 DIABETIC FOOT INFECTION (HCC): Status: ACTIVE | Noted: 2022-11-16

## 2022-11-16 LAB
ALBUMIN SERPL-MCNC: 3.6 G/DL (ref 3.5–5.2)
ALP BLD-CCNC: 42 U/L (ref 35–104)
ALT SERPL-CCNC: 5 U/L (ref 0–32)
ANION GAP SERPL CALCULATED.3IONS-SCNC: 11 MMOL/L (ref 7–16)
AST SERPL-CCNC: 10 U/L (ref 0–31)
BASOPHILS ABSOLUTE: 0.07 E9/L (ref 0–0.2)
BASOPHILS RELATIVE PERCENT: 0.7 % (ref 0–2)
BILIRUB SERPL-MCNC: 0.4 MG/DL (ref 0–1.2)
BUN BLDV-MCNC: 8 MG/DL (ref 6–23)
C-REACTIVE PROTEIN: 3.6 MG/DL (ref 0–0.4)
CALCIUM SERPL-MCNC: 9.4 MG/DL (ref 8.6–10.2)
CHLORIDE BLD-SCNC: 103 MMOL/L (ref 98–107)
CO2: 27 MMOL/L (ref 22–29)
CREAT SERPL-MCNC: 0.5 MG/DL (ref 0.5–1)
EOSINOPHILS ABSOLUTE: 0.06 E9/L (ref 0.05–0.5)
EOSINOPHILS RELATIVE PERCENT: 0.6 % (ref 0–6)
GFR SERPL CREATININE-BSD FRML MDRD: >60 ML/MIN/1.73
GLUCOSE BLD-MCNC: 111 MG/DL (ref 74–99)
HBA1C MFR BLD: 6 % (ref 4–5.6)
HCT VFR BLD CALC: 46.6 % (ref 34–48)
HEMOGLOBIN: 15.3 G/DL (ref 11.5–15.5)
IMMATURE GRANULOCYTES #: 0.03 E9/L
IMMATURE GRANULOCYTES %: 0.3 % (ref 0–5)
LYMPHOCYTES ABSOLUTE: 2.07 E9/L (ref 1.5–4)
LYMPHOCYTES RELATIVE PERCENT: 20.1 % (ref 20–42)
MAGNESIUM: 1.9 MG/DL (ref 1.6–2.6)
MCH RBC QN AUTO: 28.8 PG (ref 26–35)
MCHC RBC AUTO-ENTMCNC: 32.8 % (ref 32–34.5)
MCV RBC AUTO: 87.6 FL (ref 80–99.9)
MONOCYTES ABSOLUTE: 0.66 E9/L (ref 0.1–0.95)
MONOCYTES RELATIVE PERCENT: 6.4 % (ref 2–12)
NEUTROPHILS ABSOLUTE: 7.42 E9/L (ref 1.8–7.3)
NEUTROPHILS RELATIVE PERCENT: 71.9 % (ref 43–80)
PDW BLD-RTO: 14.3 FL (ref 11.5–15)
PLATELET # BLD: 333 E9/L (ref 130–450)
PMV BLD AUTO: 10 FL (ref 7–12)
POTASSIUM REFLEX MAGNESIUM: 3.4 MMOL/L (ref 3.5–5)
RBC # BLD: 5.32 E12/L (ref 3.5–5.5)
SEDIMENTATION RATE, ERYTHROCYTE: 12 MM/HR (ref 0–20)
SODIUM BLD-SCNC: 141 MMOL/L (ref 132–146)
TOTAL PROTEIN: 7.1 G/DL (ref 6.4–8.3)
WBC # BLD: 10.3 E9/L (ref 4.5–11.5)

## 2022-11-16 PROCEDURE — 87040 BLOOD CULTURE FOR BACTERIA: CPT

## 2022-11-16 PROCEDURE — 80053 COMPREHEN METABOLIC PANEL: CPT

## 2022-11-16 PROCEDURE — 6370000000 HC RX 637 (ALT 250 FOR IP): Performed by: NURSE PRACTITIONER

## 2022-11-16 PROCEDURE — 2580000003 HC RX 258: Performed by: PODIATRIST

## 2022-11-16 PROCEDURE — 83036 HEMOGLOBIN GLYCOSYLATED A1C: CPT

## 2022-11-16 PROCEDURE — 86140 C-REACTIVE PROTEIN: CPT

## 2022-11-16 PROCEDURE — 1200000000 HC SEMI PRIVATE

## 2022-11-16 PROCEDURE — 87075 CULTR BACTERIA EXCEPT BLOOD: CPT

## 2022-11-16 PROCEDURE — 99285 EMERGENCY DEPT VISIT HI MDM: CPT

## 2022-11-16 PROCEDURE — 83735 ASSAY OF MAGNESIUM: CPT

## 2022-11-16 PROCEDURE — 85651 RBC SED RATE NONAUTOMATED: CPT

## 2022-11-16 PROCEDURE — 87186 SC STD MICRODIL/AGAR DIL: CPT

## 2022-11-16 PROCEDURE — 87070 CULTURE OTHR SPECIMN AEROBIC: CPT

## 2022-11-16 PROCEDURE — 87077 CULTURE AEROBIC IDENTIFY: CPT

## 2022-11-16 PROCEDURE — 36415 COLL VENOUS BLD VENIPUNCTURE: CPT

## 2022-11-16 PROCEDURE — 85025 COMPLETE CBC W/AUTO DIFF WBC: CPT

## 2022-11-16 RX ORDER — SODIUM CHLORIDE 0.9 % (FLUSH) 0.9 %
5-40 SYRINGE (ML) INJECTION EVERY 12 HOURS SCHEDULED
Status: CANCELLED | OUTPATIENT
Start: 2022-11-16

## 2022-11-16 RX ORDER — ACETAMINOPHEN 325 MG/1
650 TABLET ORAL EVERY 6 HOURS PRN
Status: DISCONTINUED | OUTPATIENT
Start: 2022-11-16 | End: 2022-11-18 | Stop reason: HOSPADM

## 2022-11-16 RX ORDER — PREGABALIN 75 MG/1
75 CAPSULE ORAL 3 TIMES DAILY
Status: DISCONTINUED | OUTPATIENT
Start: 2022-11-16 | End: 2022-11-18 | Stop reason: HOSPADM

## 2022-11-16 RX ORDER — SODIUM CHLORIDE 9 MG/ML
INJECTION, SOLUTION INTRAVENOUS PRN
Status: CANCELLED | OUTPATIENT
Start: 2022-11-16

## 2022-11-16 RX ORDER — ATORVASTATIN CALCIUM 40 MG/1
40 TABLET, FILM COATED ORAL NIGHTLY
Status: DISCONTINUED | OUTPATIENT
Start: 2022-11-16 | End: 2022-11-18 | Stop reason: HOSPADM

## 2022-11-16 RX ORDER — ONDANSETRON 2 MG/ML
4 INJECTION INTRAMUSCULAR; INTRAVENOUS EVERY 6 HOURS PRN
Status: DISCONTINUED | OUTPATIENT
Start: 2022-11-16 | End: 2022-11-18 | Stop reason: HOSPADM

## 2022-11-16 RX ORDER — SODIUM CHLORIDE 0.9 % (FLUSH) 0.9 %
5-40 SYRINGE (ML) INJECTION EVERY 12 HOURS SCHEDULED
Status: DISCONTINUED | OUTPATIENT
Start: 2022-11-16 | End: 2022-11-17 | Stop reason: HOSPADM

## 2022-11-16 RX ORDER — DIPHENHYDRAMINE HCL 25 MG
25 TABLET ORAL EVERY 6 HOURS PRN
COMMUNITY

## 2022-11-16 RX ORDER — SODIUM CHLORIDE 0.9 % (FLUSH) 0.9 %
10 SYRINGE (ML) INJECTION PRN
Status: DISCONTINUED | OUTPATIENT
Start: 2022-11-16 | End: 2022-11-18 | Stop reason: HOSPADM

## 2022-11-16 RX ORDER — SODIUM CHLORIDE 9 MG/ML
INJECTION, SOLUTION INTRAVENOUS PRN
Status: DISCONTINUED | OUTPATIENT
Start: 2022-11-16 | End: 2022-11-17 | Stop reason: HOSPADM

## 2022-11-16 RX ORDER — ACETAMINOPHEN 650 MG/1
650 SUPPOSITORY RECTAL EVERY 6 HOURS PRN
Status: DISCONTINUED | OUTPATIENT
Start: 2022-11-16 | End: 2022-11-18 | Stop reason: HOSPADM

## 2022-11-16 RX ORDER — POLYETHYLENE GLYCOL 3350 17 G/17G
17 POWDER, FOR SOLUTION ORAL DAILY PRN
Status: DISCONTINUED | OUTPATIENT
Start: 2022-11-16 | End: 2022-11-18 | Stop reason: HOSPADM

## 2022-11-16 RX ORDER — SODIUM CHLORIDE 0.9 % (FLUSH) 0.9 %
5-40 SYRINGE (ML) INJECTION EVERY 12 HOURS SCHEDULED
Status: DISCONTINUED | OUTPATIENT
Start: 2022-11-16 | End: 2022-11-18 | Stop reason: HOSPADM

## 2022-11-16 RX ORDER — ONDANSETRON 4 MG/1
4 TABLET, ORALLY DISINTEGRATING ORAL EVERY 8 HOURS PRN
Status: DISCONTINUED | OUTPATIENT
Start: 2022-11-16 | End: 2022-11-18 | Stop reason: HOSPADM

## 2022-11-16 RX ORDER — SODIUM CHLORIDE 0.9 % (FLUSH) 0.9 %
5-40 SYRINGE (ML) INJECTION PRN
Status: DISCONTINUED | OUTPATIENT
Start: 2022-11-16 | End: 2022-11-17 | Stop reason: HOSPADM

## 2022-11-16 RX ORDER — SODIUM CHLORIDE 9 MG/ML
INJECTION, SOLUTION INTRAVENOUS PRN
Status: DISCONTINUED | OUTPATIENT
Start: 2022-11-16 | End: 2022-11-18 | Stop reason: HOSPADM

## 2022-11-16 RX ORDER — HYDROCODONE BITARTRATE AND ACETAMINOPHEN 5; 325 MG/1; MG/1
1 TABLET ORAL EVERY 6 HOURS PRN
Status: DISCONTINUED | OUTPATIENT
Start: 2022-11-16 | End: 2022-11-18 | Stop reason: HOSPADM

## 2022-11-16 RX ORDER — SODIUM CHLORIDE 0.9 % (FLUSH) 0.9 %
5-40 SYRINGE (ML) INJECTION PRN
Status: CANCELLED | OUTPATIENT
Start: 2022-11-16

## 2022-11-16 RX ADMIN — ATORVASTATIN CALCIUM 40 MG: 40 TABLET, FILM COATED ORAL at 19:50

## 2022-11-16 RX ADMIN — SODIUM CHLORIDE, PRESERVATIVE FREE 10 ML: 5 INJECTION INTRAVENOUS at 19:50

## 2022-11-16 RX ADMIN — HYDROCODONE BITARTRATE AND ACETAMINOPHEN 1 TABLET: 5; 325 TABLET ORAL at 18:21

## 2022-11-16 RX ADMIN — PREGABALIN 75 MG: 75 CAPSULE ORAL at 14:38

## 2022-11-16 RX ADMIN — ACETAMINOPHEN 650 MG: 325 TABLET ORAL at 14:38

## 2022-11-16 RX ADMIN — PREGABALIN 75 MG: 75 CAPSULE ORAL at 19:50

## 2022-11-16 ASSESSMENT — PAIN DESCRIPTION - LOCATION
LOCATION: FOOT
LOCATION: FOOT

## 2022-11-16 ASSESSMENT — PAIN DESCRIPTION - ORIENTATION
ORIENTATION: LEFT
ORIENTATION: RIGHT;LEFT

## 2022-11-16 ASSESSMENT — ENCOUNTER SYMPTOMS
VOMITING: 0
DIARRHEA: 0
ABDOMINAL PAIN: 0
COLOR CHANGE: 0
BACK PAIN: 0
NAUSEA: 0
COUGH: 0
SHORTNESS OF BREATH: 0

## 2022-11-16 ASSESSMENT — PAIN DESCRIPTION - DESCRIPTORS: DESCRIPTORS: ACHING;SORE

## 2022-11-16 ASSESSMENT — PAIN SCALES - GENERAL
PAINLEVEL_OUTOF10: 8
PAINLEVEL_OUTOF10: 6
PAINLEVEL_OUTOF10: 6

## 2022-11-16 ASSESSMENT — LIFESTYLE VARIABLES: SMOKING_STATUS: 1

## 2022-11-16 NOTE — PROGRESS NOTES
Database initiated pharmacy and medications verified with the patient. She is A&O from home alone. States she has a walker but doesn't need it.
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
## 2022-11-16 NOTE — PROGRESS NOTES
Pt requested to not have dressing on her left foot since she is having surgery tomorrow.        Electronically signed by Lucretia Bee RN on 11/16/2022 at 3:44 PM

## 2022-11-17 ENCOUNTER — ANESTHESIA (OUTPATIENT)
Dept: OPERATING ROOM | Age: 67
DRG: 617 | End: 2022-11-17
Payer: MEDICARE

## 2022-11-17 LAB
ANION GAP SERPL CALCULATED.3IONS-SCNC: 8 MMOL/L (ref 7–16)
BASOPHILS ABSOLUTE: 0.05 E9/L (ref 0–0.2)
BASOPHILS RELATIVE PERCENT: 0.6 % (ref 0–2)
BUN BLDV-MCNC: 9 MG/DL (ref 6–23)
CALCIUM SERPL-MCNC: 9.2 MG/DL (ref 8.6–10.2)
CHLORIDE BLD-SCNC: 102 MMOL/L (ref 98–107)
CO2: 29 MMOL/L (ref 22–29)
CREAT SERPL-MCNC: 0.5 MG/DL (ref 0.5–1)
EOSINOPHILS ABSOLUTE: 0.12 E9/L (ref 0.05–0.5)
EOSINOPHILS RELATIVE PERCENT: 1.3 % (ref 0–6)
GFR SERPL CREATININE-BSD FRML MDRD: >60 ML/MIN/1.73
GLUCOSE BLD-MCNC: 137 MG/DL (ref 74–99)
HCT VFR BLD CALC: 44.4 % (ref 34–48)
HEMOGLOBIN: 14.6 G/DL (ref 11.5–15.5)
IMMATURE GRANULOCYTES #: 0.02 E9/L
IMMATURE GRANULOCYTES %: 0.2 % (ref 0–5)
LYMPHOCYTES ABSOLUTE: 3.11 E9/L (ref 1.5–4)
LYMPHOCYTES RELATIVE PERCENT: 34.7 % (ref 20–42)
MAGNESIUM: 2.1 MG/DL (ref 1.6–2.6)
MCH RBC QN AUTO: 29.2 PG (ref 26–35)
MCHC RBC AUTO-ENTMCNC: 32.9 % (ref 32–34.5)
MCV RBC AUTO: 88.8 FL (ref 80–99.9)
MONOCYTES ABSOLUTE: 0.69 E9/L (ref 0.1–0.95)
MONOCYTES RELATIVE PERCENT: 7.7 % (ref 2–12)
NEUTROPHILS ABSOLUTE: 4.97 E9/L (ref 1.8–7.3)
NEUTROPHILS RELATIVE PERCENT: 55.5 % (ref 43–80)
PDW BLD-RTO: 14.6 FL (ref 11.5–15)
PLATELET # BLD: 300 E9/L (ref 130–450)
PMV BLD AUTO: 9.6 FL (ref 7–12)
POTASSIUM REFLEX MAGNESIUM: 3.5 MMOL/L (ref 3.5–5)
RBC # BLD: 5 E12/L (ref 3.5–5.5)
SODIUM BLD-SCNC: 139 MMOL/L (ref 132–146)
WBC # BLD: 9 E9/L (ref 4.5–11.5)

## 2022-11-17 PROCEDURE — 87116 MYCOBACTERIA CULTURE: CPT

## 2022-11-17 PROCEDURE — 3600000012 HC SURGERY LEVEL 2 ADDTL 15MIN: Performed by: PODIATRIST

## 2022-11-17 PROCEDURE — 85025 COMPLETE CBC W/AUTO DIFF WBC: CPT

## 2022-11-17 PROCEDURE — 3600000002 HC SURGERY LEVEL 2 BASE: Performed by: PODIATRIST

## 2022-11-17 PROCEDURE — 2500000003 HC RX 250 WO HCPCS: Performed by: PODIATRIST

## 2022-11-17 PROCEDURE — 87070 CULTURE OTHR SPECIMN AEROBIC: CPT

## 2022-11-17 PROCEDURE — 3700000000 HC ANESTHESIA ATTENDED CARE: Performed by: PODIATRIST

## 2022-11-17 PROCEDURE — 6370000000 HC RX 637 (ALT 250 FOR IP): Performed by: NURSE PRACTITIONER

## 2022-11-17 PROCEDURE — 88305 TISSUE EXAM BY PATHOLOGIST: CPT

## 2022-11-17 PROCEDURE — 36415 COLL VENOUS BLD VENIPUNCTURE: CPT

## 2022-11-17 PROCEDURE — 2580000003 HC RX 258: Performed by: NURSE ANESTHETIST, CERTIFIED REGISTERED

## 2022-11-17 PROCEDURE — 87206 SMEAR FLUORESCENT/ACID STAI: CPT

## 2022-11-17 PROCEDURE — 7100000010 HC PHASE II RECOVERY - FIRST 15 MIN: Performed by: PODIATRIST

## 2022-11-17 PROCEDURE — 87075 CULTR BACTERIA EXCEPT BLOOD: CPT

## 2022-11-17 PROCEDURE — 2709999900 HC NON-CHARGEABLE SUPPLY: Performed by: PODIATRIST

## 2022-11-17 PROCEDURE — 7100000011 HC PHASE II RECOVERY - ADDTL 15 MIN: Performed by: PODIATRIST

## 2022-11-17 PROCEDURE — 87205 SMEAR GRAM STAIN: CPT

## 2022-11-17 PROCEDURE — 6360000002 HC RX W HCPCS: Performed by: NURSE ANESTHETIST, CERTIFIED REGISTERED

## 2022-11-17 PROCEDURE — 1200000000 HC SEMI PRIVATE

## 2022-11-17 PROCEDURE — 2580000003 HC RX 258: Performed by: NURSE PRACTITIONER

## 2022-11-17 PROCEDURE — 80048 BASIC METABOLIC PNL TOTAL CA: CPT

## 2022-11-17 PROCEDURE — 3700000001 HC ADD 15 MINUTES (ANESTHESIA): Performed by: PODIATRIST

## 2022-11-17 PROCEDURE — 88311 DECALCIFY TISSUE: CPT

## 2022-11-17 PROCEDURE — 83735 ASSAY OF MAGNESIUM: CPT

## 2022-11-17 PROCEDURE — 0Y6S0Z0 DETACHMENT AT LEFT 2ND TOE, COMPLETE, OPEN APPROACH: ICD-10-PCS

## 2022-11-17 PROCEDURE — 97161 PT EVAL LOW COMPLEX 20 MIN: CPT

## 2022-11-17 PROCEDURE — 6370000000 HC RX 637 (ALT 250 FOR IP): Performed by: PODIATRIST

## 2022-11-17 PROCEDURE — 87102 FUNGUS ISOLATION CULTURE: CPT

## 2022-11-17 RX ORDER — BUPIVACAINE HYDROCHLORIDE 5 MG/ML
INJECTION, SOLUTION EPIDURAL; INTRACAUDAL PRN
Status: DISCONTINUED | OUTPATIENT
Start: 2022-11-17 | End: 2022-11-17 | Stop reason: ALTCHOICE

## 2022-11-17 RX ORDER — MEPERIDINE HYDROCHLORIDE 25 MG/ML
12.5 INJECTION INTRAMUSCULAR; INTRAVENOUS; SUBCUTANEOUS ONCE
Status: DISCONTINUED | OUTPATIENT
Start: 2022-11-17 | End: 2022-11-17 | Stop reason: HOSPADM

## 2022-11-17 RX ORDER — CEFAZOLIN SODIUM 1 G/3ML
INJECTION, POWDER, FOR SOLUTION INTRAMUSCULAR; INTRAVENOUS PRN
Status: DISCONTINUED | OUTPATIENT
Start: 2022-11-17 | End: 2022-11-17 | Stop reason: SDUPTHER

## 2022-11-17 RX ORDER — SODIUM CHLORIDE 9 MG/ML
INJECTION, SOLUTION INTRAVENOUS PRN
Status: DISCONTINUED | OUTPATIENT
Start: 2022-11-17 | End: 2022-11-17 | Stop reason: HOSPADM

## 2022-11-17 RX ORDER — PROCHLORPERAZINE EDISYLATE 5 MG/ML
5 INJECTION INTRAMUSCULAR; INTRAVENOUS
Status: DISCONTINUED | OUTPATIENT
Start: 2022-11-17 | End: 2022-11-17 | Stop reason: HOSPADM

## 2022-11-17 RX ORDER — DIPHENHYDRAMINE HYDROCHLORIDE 50 MG/ML
12.5 INJECTION INTRAMUSCULAR; INTRAVENOUS
Status: DISCONTINUED | OUTPATIENT
Start: 2022-11-17 | End: 2022-11-17 | Stop reason: HOSPADM

## 2022-11-17 RX ORDER — PROPOFOL 10 MG/ML
INJECTION, EMULSION INTRAVENOUS PRN
Status: DISCONTINUED | OUTPATIENT
Start: 2022-11-17 | End: 2022-11-17 | Stop reason: SDUPTHER

## 2022-11-17 RX ORDER — FENTANYL CITRATE 50 UG/ML
50 INJECTION, SOLUTION INTRAMUSCULAR; INTRAVENOUS EVERY 5 MIN PRN
Status: DISCONTINUED | OUTPATIENT
Start: 2022-11-17 | End: 2022-11-17 | Stop reason: HOSPADM

## 2022-11-17 RX ORDER — SODIUM CHLORIDE 0.9 % (FLUSH) 0.9 %
5-40 SYRINGE (ML) INJECTION EVERY 12 HOURS SCHEDULED
Status: DISCONTINUED | OUTPATIENT
Start: 2022-11-17 | End: 2022-11-17 | Stop reason: HOSPADM

## 2022-11-17 RX ORDER — SODIUM CHLORIDE 0.9 % (FLUSH) 0.9 %
5-40 SYRINGE (ML) INJECTION PRN
Status: DISCONTINUED | OUTPATIENT
Start: 2022-11-17 | End: 2022-11-17 | Stop reason: HOSPADM

## 2022-11-17 RX ORDER — SODIUM CHLORIDE 9 MG/ML
INJECTION, SOLUTION INTRAVENOUS CONTINUOUS PRN
Status: DISCONTINUED | OUTPATIENT
Start: 2022-11-17 | End: 2022-11-17 | Stop reason: SDUPTHER

## 2022-11-17 RX ADMIN — HYDROCODONE BITARTRATE AND ACETAMINOPHEN 1 TABLET: 5; 325 TABLET ORAL at 14:02

## 2022-11-17 RX ADMIN — PROPOFOL 30 MG: 10 INJECTION, EMULSION INTRAVENOUS at 17:34

## 2022-11-17 RX ADMIN — PROPOFOL 15 MG: 10 INJECTION, EMULSION INTRAVENOUS at 17:35

## 2022-11-17 RX ADMIN — PROPOFOL 125 MCG/KG/MIN: 10 INJECTION, EMULSION INTRAVENOUS at 17:33

## 2022-11-17 RX ADMIN — PREGABALIN 75 MG: 75 CAPSULE ORAL at 12:57

## 2022-11-17 RX ADMIN — HYDROCODONE BITARTRATE AND ACETAMINOPHEN 1 TABLET: 5; 325 TABLET ORAL at 08:01

## 2022-11-17 RX ADMIN — CEFAZOLIN 2 G: 1 INJECTION, POWDER, FOR SOLUTION INTRAMUSCULAR; INTRAVENOUS at 17:48

## 2022-11-17 RX ADMIN — HYDROCODONE BITARTRATE AND ACETAMINOPHEN 1 TABLET: 5; 325 TABLET ORAL at 20:17

## 2022-11-17 RX ADMIN — PREGABALIN 75 MG: 75 CAPSULE ORAL at 20:17

## 2022-11-17 RX ADMIN — SODIUM CHLORIDE, PRESERVATIVE FREE 10 ML: 5 INJECTION INTRAVENOUS at 08:02

## 2022-11-17 RX ADMIN — ATORVASTATIN CALCIUM 40 MG: 40 TABLET, FILM COATED ORAL at 20:17

## 2022-11-17 RX ADMIN — SODIUM CHLORIDE: 9 INJECTION, SOLUTION INTRAVENOUS at 17:25

## 2022-11-17 ASSESSMENT — PAIN DESCRIPTION - LOCATION
LOCATION: FOOT

## 2022-11-17 ASSESSMENT — PAIN DESCRIPTION - ORIENTATION
ORIENTATION: RIGHT;LEFT

## 2022-11-17 ASSESSMENT — PAIN SCALES - GENERAL
PAINLEVEL_OUTOF10: 8
PAINLEVEL_OUTOF10: 9
PAINLEVEL_OUTOF10: 9
PAINLEVEL_OUTOF10: 0
PAINLEVEL_OUTOF10: 5

## 2022-11-17 ASSESSMENT — PAIN - FUNCTIONAL ASSESSMENT: PAIN_FUNCTIONAL_ASSESSMENT: ACTIVITIES ARE NOT PREVENTED

## 2022-11-17 ASSESSMENT — PAIN DESCRIPTION - DESCRIPTORS
DESCRIPTORS: ACHING;BURNING;CRUSHING
DESCRIPTORS: ACHING

## 2022-11-17 ASSESSMENT — PAIN SCALES - WONG BAKER: WONGBAKER_NUMERICALRESPONSE: 2

## 2022-11-17 NOTE — CARE COORDINATION
Introduced my self and provided explanation of CM role to patient. Patient is awake, alert, and aware of current diagnosis and treatment plan including podiatry consult, plans for OR - Left 2nd toe amp  She voices she resides at home alone and completes her adl's with independence. She uses a walker as necessary. She is exploring purchase of a knee scooter. She has nieces and sisters who assist her with transport, etc.  Patient is established with a pcp and denies any issue with retail pharmaceutical coverage. She has [facility] but indicates she completed care with them. She acknowledged [facility] will most likely again be indicated. She defers review of provider list and asks for resumption of care with [facility]. A call is placed to Juhi with [facility] and referral initiated. Will follow along with  and assist with discharge planning as necessary. Explained ELOS of 48-72 hours; patient voiced understanding and agreement. The Plan for Transition of Care is related to the following treatment goals: Anne Chris    The Patient was provided with a choice of provider and agrees   with the discharge plan. [x] Yes [] No    Freedom of choice list was provided with basic dialogue that supports the patient's individualized plan of care/goals, treatment preferences and shares the quality data associated with the providers. [] Yes [x] No - patient deferred, spontaneously named Juan Leon, MSN RN  [facility] Case Management  [phone]
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
## 2022-11-17 NOTE — PATIENT CARE CONFERENCE
P Quality Flow/Interdisciplinary Rounds Progress Note        Quality Flow Rounds held on November 17, 2022    Disciplines Attending:  Bedside Nurse, , , and Nursing Unit Leadership    Sindy Gao was admitted on 11/16/2022 10:39 AM    Anticipated Discharge Date:       Disposition:    Andrea Score:  Andrea Scale Score: 19    Readmission Risk              Risk of Unplanned Readmission:  13           Discussed patient goal for the day, patient clinical progression, and barriers to discharge.   The following Goal(s) of the Day/Commitment(s) have been identified:  Diagnostics - Report Results      Doretha Kumar RN  November 17, 2022
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
## 2022-11-17 NOTE — PROGRESS NOTES
Physical Therapy  Facility/Department: [facility] SURG  Physical Therapy Initial Assessment    Name: Saul Cedeno  : 1955  MRN: [id number]  Date of Service: 2022    Attending Provider:  Jhon Dawn MD    Evaluating PT:  Marianna Clark, P.T. Room #:  4569/3229-U  Diagnosis:  Diabetic foot infection ([facility] [address] [E11.628, L08.9]  Left foot infection [L08.9]  Pertinent PMHx/PSHx:  L great toe amp 22  Procedure/Surgery:  pt is expected to have surgery today 22 for amputation of L 2nd toe  Precautions:  none    SUBJECTIVE:    Pt lives alone in an apt with no stairs to enter. Pt ambulated with no AD PTA and states she has a ww if needed. OBJECTIVE:   Initial Evaluation  Date: 22 Treatment Short Term/ Long Term   Goals   Was pt agreeable to Eval/treatment? yes     Does pt have pain? C/o pain L foot     Bed Mobility  Rolling: Independent  Supine to sit: Independent  Sit to supine: Independent  Scooting: Independent  Independent   Transfers Sit to stand: Independent  Stand to sit: Independent  Stand pivot: Independent  Independent   Ambulation   50 feet with no AD Independent   150 feet with AAD if needed after surgery Independent    Stair negotiation: ascended and descended NA  NA   AM-PAC 6 Clicks 14/75       BLE ROM is WFL. BLE strength is grossly WFL. Sensation:  Pt c/o numbness and tingling to B feet  Balance: sitting is Independent and standing with no AD is Independent   Endurance: fair+    ASSESSMENT:  Comments:  Pt is Independent with functional mobility at this time, but is expected to have L 2nd toe amp later today. Will keep pt on PT list and re-assess pt post-op to ensure she is still able to function Independently. Pt was left in bed with call light left by patient. Pt's/ family goals   1. To go home. Patient and or family understand(s) diagnosis, prognosis, and plan of care.     PHYSICAL THERAPY PLAN OF CARE:    PT POC is established based on physician order and patient diagnosis     Referring provider/PT Order:  PT eval and treat  Diagnosis:  Diabetic foot infection ([facility] [address] [Y78.333, L08.9]  Left foot infection [L08.9]  Specific instructions for next treatment:  re-assess pt post-op     Current Treatment Recommendations will re-assess the following post-op:     [x] Strengthening to improve independence with functional mobility   [] ROM to improve ROM and decrease spasm and pain which will help promote independence with functional mobility   [x] Balance Training to improve static/dynamic balance and to reduce fall risk  [x] Endurance Training to improve activity tolerance during functional mobility   [x] Transfer Training to improve safety and independence with all functional transfers   [x] Gait Training to improve gait mechanics, endurance and assess need for appropriate assistive device  [] Stair Training in preparation for safe discharge home and/or into the community   [] Positioning to prevent skin breakdown and contractures  [] Safety and Education Training   [x] Patient/Caregiver Education   [] HEP  [] Other     PT long term treatment goals are located in above grid    Frequency of treatments: 2-5x/week x 1-2 weeks. Time in  12:45  Time out  12:55    Evaluation Time includes thorough review of current medical information, gathering information on past medical history/social history and prior level of function, completion of standardized testing/informal observation of tasks, assessment of data and education on plan of care and goals. CPT codes:  [x] Low Complexity PT evaluation 41575  [] Moderate Complexity PT evaluation 53590  [] High Complexity PT evaluation 14394  [] PT Re-evaluation 96997  [] Gait training 69575 ** minutes  [] Manual therapy 09268 ** minutes  [] Therapeutic activities 42707 ** minutes  [] Therapeutic exercises 45169 ** minutes  [] Neuromuscular reeducation 29213 ** minutes     Jake Price., P.T.   License Number: [id number]

## 2022-11-17 NOTE — BRIEF OP NOTE
Brief Postoperative Note      Patient: Rachel Glynn  YOB: 1955  MRN: [id number]    Date of Procedure: 11/17/2022    Pre-Op Diagnosis: Osteomyelitis 2nd digit, gangrene 2nd digit left foot     Post-Op Diagnosis: Same       Procedure(s):  AMPUTATION 2ND DIGIT LEFT FOOT    Surgeon(s):  Dakotah Morrell DPM    Assistant:  Resident: ANAYELI Dean [address] [place] [address] PGY-1    Anesthesia: General    Estimated Blood Loss (mL): Minimal, less than 5cc    Complications: None    Specimens:   ID Type Source Tests Collected by Time Destination   1 : LEFT FOOT SECOND DIGIT  Bone Bone CULTURE, ANAEROBIC, CULTURE, FUNGUS, GRAM STAIN, CULTURE, SURGICAL, CULTURE WITH SMEAR, ACID FAST BACILLIUS Dakotah Morrell DPM 11/17/2022 1742    A : LEFT FOOT SECOND DIGIT Specimen Toe SURGICAL PATHOLOGY Dakotah Morrell DPM 11/17/2022 1740        Implants:  * No implants in log *      Drains: * No LDAs found *    Findings: Consistent with diagnosis     Electronically signed by Suly Gudino DPM on 11/17/2022 at 6:06 PM
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
## 2022-11-17 NOTE — OP NOTE
Operative Note      Patient: Candido Martin  YOB: 1955  MRN: [id number]    Date of Procedure: 11/17/2022    Pre-Op Diagnosis: Osteomyelitis 2nd digit, gangrene 2nd digit left foot      Post-Op Diagnosis: Same       Procedure(s):  AMPUTATION 2ND DIGIT LEFT FOOT    Surgeon(s):  Hamzah Lala DPM    Assistant:   Resident: ANAYELI Barba [address] [facility] PGY-1   Anesthesia: General    Estimated Blood Loss (mL): Minimal, less than 5cc    Complications: None    Specimens:   ID Type Source Tests Collected by Time Destination   1 : LEFT FOOT SECOND DIGIT  Bone Bone CULTURE, ANAEROBIC, CULTURE, FUNGUS, GRAM STAIN, CULTURE, SURGICAL, CULTURE WITH SMEAR, ACID FAST BACILLIUS Hamzah Lala DPM 11/17/2022 1742    A : LEFT FOOT SECOND DIGIT Specimen Toe SURGICAL PATHOLOGY Hamzah Lala DPM 11/17/2022 1740        Implants:  * No implants in log *      Drains: * No LDAs found *    Findings: Consistent with diagnosis     Detailed Description of Procedure: On 11/17/22 Ms. Carrington Zhu was transported to the OR in patient's cart and in supine position. Following induction of intravenous sedation and local regional anesthesia, local anesthesia was given in a digital fashion, which consisted of 0.5% marcaine plain for a total of 10 cc. The pneumatic ankle tourniquet was applied to a well-padded site just proximal to the malleoli. The left foot was then prepped and draped in the usually sterile fashion. The pneumatic ankle was rapidly inflated to 225mmHg. The timeout was performed and the following procedure was performed. Attention was then directed towards the patient's second digit of the left foot where an ulceration was noted dorsally and osteomyelitis had previously been diagnosed on Xray. An approximately 3-cm curvilinear incision was made both dorsally and plantarly, center over the proximal phalanx, forming a fishmouth-type incision. This incision was deepened down to the level of the deep tissues. Dissection was performed along the periosteum of the proximal phalanx. The digit was freed from all remaining soft tissue. The left second digit was excised and sent to pathology. A wound culture was then obtained and sent to the lab. The incision was irrigated with copious amount of saline using the bulb. Final inspection by manually re-approximating medial and lateral skin edges revealed adequate soft tissue coverage. At this time, attention was directed towards closure. The incision site was re-approximated and maintained utilizing 3-0 vicryl and 3-0 nylon using a combination of simple and vertical type sutures. Skin edges were well approximated and no excess skin tension was noted to the incision line. The foot was was cleansed with normal saline and patted dry. Being satisfied with this, attention was then directed towards bandaging. Utilizing xeroform, 4x4s, ABDs, Kerlix and ace, the left foot was then dressed in a slightly compressive type fashion. The patient tolerated the procedure and anesthesia well. All specimens removed during surgery were sent to Pathology for both gross and microscopic analysis. The patient was transferred  to the recovery room with all vital signs stable and vascular status intact to all remaining toes on the left foot. Following a period of postoperative monitoring, the patient will be transferred back to her  room prior to surgery.      Electronically signed by Alvarez Williamson DPM on 11/17/2022 at 6:10 PM

## 2022-11-18 VITALS
DIASTOLIC BLOOD PRESSURE: 73 MMHG | OXYGEN SATURATION: 92 % | BODY MASS INDEX: 18.28 KG/M2 | TEMPERATURE: 98.6 F | RESPIRATION RATE: 18 BRPM | WEIGHT: 135 LBS | HEIGHT: 72 IN | HEART RATE: 62 BPM | SYSTOLIC BLOOD PRESSURE: 163 MMHG

## 2022-11-18 LAB
ANAEROBIC CULTURE: NORMAL
ANION GAP SERPL CALCULATED.3IONS-SCNC: 9 MMOL/L (ref 7–16)
BUN BLDV-MCNC: 10 MG/DL (ref 6–23)
CALCIUM SERPL-MCNC: 9.2 MG/DL (ref 8.6–10.2)
CHLORIDE BLD-SCNC: 101 MMOL/L (ref 98–107)
CO2: 28 MMOL/L (ref 22–29)
CREAT SERPL-MCNC: 0.5 MG/DL (ref 0.5–1)
GFR SERPL CREATININE-BSD FRML MDRD: >60 ML/MIN/1.73
GLUCOSE BLD-MCNC: 140 MG/DL (ref 74–99)
HCT VFR BLD CALC: 44.6 % (ref 34–48)
HEMOGLOBIN: 15.1 G/DL (ref 11.5–15.5)
MCH RBC QN AUTO: 30.3 PG (ref 26–35)
MCHC RBC AUTO-ENTMCNC: 33.9 % (ref 32–34.5)
MCV RBC AUTO: 89.4 FL (ref 80–99.9)
ORGANISM: ABNORMAL
ORGANISM: ABNORMAL
PDW BLD-RTO: 14.3 FL (ref 11.5–15)
PLATELET # BLD: 308 E9/L (ref 130–450)
PMV BLD AUTO: 9.8 FL (ref 7–12)
POTASSIUM SERPL-SCNC: 3.4 MMOL/L (ref 3.5–5)
RBC # BLD: 4.99 E12/L (ref 3.5–5.5)
SODIUM BLD-SCNC: 138 MMOL/L (ref 132–146)
WBC # BLD: 9.4 E9/L (ref 4.5–11.5)
WOUND/ABSCESS: ABNORMAL

## 2022-11-18 PROCEDURE — 97165 OT EVAL LOW COMPLEX 30 MIN: CPT

## 2022-11-18 PROCEDURE — 36415 COLL VENOUS BLD VENIPUNCTURE: CPT

## 2022-11-18 PROCEDURE — 85027 COMPLETE CBC AUTOMATED: CPT

## 2022-11-18 PROCEDURE — 80048 BASIC METABOLIC PNL TOTAL CA: CPT

## 2022-11-18 PROCEDURE — 6360000002 HC RX W HCPCS: Performed by: STUDENT IN AN ORGANIZED HEALTH CARE EDUCATION/TRAINING PROGRAM

## 2022-11-18 PROCEDURE — 6370000000 HC RX 637 (ALT 250 FOR IP): Performed by: PODIATRIST

## 2022-11-18 PROCEDURE — 2580000003 HC RX 258: Performed by: PODIATRIST

## 2022-11-18 PROCEDURE — 2580000003 HC RX 258: Performed by: STUDENT IN AN ORGANIZED HEALTH CARE EDUCATION/TRAINING PROGRAM

## 2022-11-18 RX ORDER — HYDROCODONE BITARTRATE AND ACETAMINOPHEN 5; 325 MG/1; MG/1
1 TABLET ORAL EVERY 8 HOURS PRN
Qty: 9 TABLET | Refills: 0 | Status: SHIPPED | OUTPATIENT
Start: 2022-11-18 | End: 2022-11-21

## 2022-11-18 RX ORDER — SULFAMETHOXAZOLE AND TRIMETHOPRIM 800; 160 MG/1; MG/1
1 TABLET ORAL 2 TIMES DAILY
Qty: 14 TABLET | Refills: 0 | Status: SHIPPED | OUTPATIENT
Start: 2022-11-18 | End: 2022-11-25

## 2022-11-18 RX ADMIN — VANCOMYCIN HYDROCHLORIDE 1000 MG: 1 INJECTION, POWDER, LYOPHILIZED, FOR SOLUTION INTRAVENOUS at 11:47

## 2022-11-18 RX ADMIN — HYDROCODONE BITARTRATE AND ACETAMINOPHEN 1 TABLET: 5; 325 TABLET ORAL at 03:16

## 2022-11-18 RX ADMIN — PREGABALIN 75 MG: 75 CAPSULE ORAL at 13:30

## 2022-11-18 RX ADMIN — HYDROCODONE BITARTRATE AND ACETAMINOPHEN 1 TABLET: 5; 325 TABLET ORAL at 09:24

## 2022-11-18 RX ADMIN — PREGABALIN 75 MG: 75 CAPSULE ORAL at 08:33

## 2022-11-18 RX ADMIN — HYDROCODONE BITARTRATE AND ACETAMINOPHEN 1 TABLET: 5; 325 TABLET ORAL at 15:59

## 2022-11-18 RX ADMIN — SODIUM CHLORIDE, PRESERVATIVE FREE 10 ML: 5 INJECTION INTRAVENOUS at 08:35

## 2022-11-18 ASSESSMENT — PAIN SCALES - GENERAL
PAINLEVEL_OUTOF10: 9
PAINLEVEL_OUTOF10: 8
PAINLEVEL_OUTOF10: 8

## 2022-11-18 ASSESSMENT — PAIN DESCRIPTION - DESCRIPTORS
DESCRIPTORS: BURNING;THROBBING
DESCRIPTORS: SHARP;STABBING;DISCOMFORT

## 2022-11-18 ASSESSMENT — PAIN DESCRIPTION - ORIENTATION: ORIENTATION: RIGHT;LEFT

## 2022-11-18 ASSESSMENT — PAIN - FUNCTIONAL ASSESSMENT: PAIN_FUNCTIONAL_ASSESSMENT: ACTIVITIES ARE NOT PREVENTED

## 2022-11-18 ASSESSMENT — PAIN DESCRIPTION - LOCATION: LOCATION: FOOT;TOE (COMMENT WHICH ONE)

## 2022-11-18 NOTE — PROGRESS NOTES
CLINICAL PHARMACY NOTE: MEDS TO BEDS    Total # of Prescriptions Filled: 2   The following medications were delivered to the patient:  Bactrim ds 800-160 mg   Norco 5/325 mg       Additional Documentation:
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
## 2022-11-18 NOTE — CARE COORDINATION
Verified with Juhi with [facility] that patient has been accepted by agency. With addition of ID consult and the initiation of IV atb therapy, call also placed to Augusto Sever with [facility]. He will perfomr benefit check and report findings. Will await final orders from id and podiatry, reflecting same in [place] [address] order(s) as necessary. NIEVES Méndez RN  [facility] Case Management  [phone]    Chart reviewed and orders noted. Spoke with patient who acknowledged plan for discharge on po atb therapy. She indicates her desire to self manage qod dressing changes, adding she will report any issues/difficulties to Dr. Gregory Whitehead. As such, referral to Kai and [facility] were cancelled. Lo Leon, NIEVES RN  [facility] Case Management  [phone]

## 2022-11-18 NOTE — PROGRESS NOTES
[facility]                                Progress note    Subjective: The patient is awake and alert. Resting comfortably     Objective:    BP (!) 154/65   Pulse 60   Temp 98.1 °F (36.7 °C) (Oral)   Resp 16   Ht 6' (1.829 m)   Wt 135 lb (61.2 kg)   SpO2 92%   BMI 18.31 kg/m²     In: 540 [P.O.:240; I.V.:300]  Out: -   In: 540   Out: -     General appearance: NAD, conversant  HEENT: AT/NC, MMM  Neck: FROM, supple  Lungs: Clear to auscultation  CV: RRR, no MRGs  Vasc: Radial pulses 2+  Abdomen: Soft, non-tender; no masses or HSM  Extremities: No peripheral edema or digital cyanosis s/p L 2nd digit amputation  Skin: no rash, lesions or ulcers  Psych: Alert and oriented to person, place and time  Neuro: Alert and interactive     Recent Labs     11/15/22  1539 11/16/22  1202 11/17/22  0532   WBC 13.7* 10.3 9.0   HGB 16.2* 15.3 14.6   HCT 48.7* 46.6 44.4    333 300       Recent Labs     11/15/22  1539 11/16/22  1202 11/17/22  0532    141 139   K 3.5 3.4* 3.5    103 102   CO2 27 27 29   BUN 7 8 9   CREATININE 0.5 0.5 0.5   CALCIUM 10.3* 9.4 9.2       Assessment:    Principal Problem:    Diabetic foot infection (HCC)  Resolved Problems:    * No resolved hospital problems.  *      Plan:  Patient is a 59-year-old female admitted to [place] [address] for  Diabetic foot infection  -Monitor labs  -Check CRP  -Podiatry following  -Plans for OR first left second digit amputation  -Wound cultures in OR  -PT OT  -Pain management  -A1c 6.0  -Lovenox when okay with podiatry  -Vanessa Bah to proceed to the OR from medicine standpoint    11/18/22  -s/p L 2nd digit amputation  -Wound cultures with moderate growth of staph aureus  -Await morning labs  -ID following   -IV vanc   -Await PT OT Recs     Code status: Full  Consultants: Podiatry and ID  DVT Prophylaxis PCD's  PT/OT  Discharge planning       FREDDY Patel - CNP  9:52 AM  11/18/2022     Above note edited to reflect my thoughts     I personally saw, examined and provided care for the patient. Radiographs, labs and medication list were reviewed by me independently. The case was discussed in detail and plans for care were established. Review of KAREN Bryson   , documentation was conducted and revisions were made as appropriate directly by me. I agree with the above documented exam, problem list, and plan of care.      Jhon Dawn MD  11/18/22

## 2022-11-18 NOTE — PROGRESS NOTES
Pharmacy Consultation Note  (Antibiotic Dosing and Monitoring)    Initial consult date: 11/18/22  Consulting physician/provider: Dr. Eli To  Drug: Vancomycin  Indication: Bone & Joint Infection    Age/  Gender Height Weight IBW  Allergy Information   67 y.o./female 6' (182.9 cm) 133 lb (60.3 kg)     Ideal body weight: 73.1 kg (161 lb 2.5 oz)   Codeine      Renal Function:  Recent Labs     11/15/22  1539 11/16/22  1202 11/17/22  0532   BUN 7 8 9   CREATININE 0.5 0.5 0.5       Intake/Output Summary (Last 24 hours) at 11/18/2022 1024  Last data filed at 11/18/2022 0743  Gross per 24 hour   Intake 540 ml   Output --   Net 540 ml       Vancomycin Monitoring:  Trough:  No results for input(s): VANCOTROUGH in the last 72 hours. Random:  No results for input(s): VANCORANDOM in the last 72 hours. No results for input(s): Kenyatta Almas in the last 72 hours. Historical Cultures:  Organism   Date Value Ref Range Status   11/16/2022 Staphylococcus aureus (A)  Final     Recent Labs     11/16/22  1500   BC 24 Hours no growth       Vancomycin Administration Times:  Recent vancomycin administrations        No vancomycin IV orders with administrations found. Orders not given:            vancomycin 1000 mg IVPB in 250 mL D5W addavial                  Assessment:  Patient is a 78 y.o. female who has been initiated on vancomycin  Estimated Creatinine Clearance: 107 mL/min (based on SCr of 0.5 mg/dL). To dose vancomycin, pharmacy will be utilizing [employer] calculation software for goal AUC/DEXTER 400-600 mg/L-hr    Plan:   Will continue vancomycin 1000 mg IV every 12 hours  Will check vancomycin levels when appropriate  Will continue to monitor renal function   Pharmacy to follow      Iram Brown [facility] 11/18/2022 10:24 AM

## 2022-11-18 NOTE — ANESTHESIA POSTPROCEDURE EVALUATION
Department of Anesthesiology  Postprocedure Note    Patient: Ken Whyte  MRN: [id number]  YOB: 1955  Date of evaluation: 11/17/2022      Procedure Summary     Date: 11/17/22 Room / Location: [facility] OR 01 / [facility]    Anesthesia Start: 151 [facility] Anesthesia Stop: 1809    Procedure: AMPUTATION 2ND DIGIT LEFT FOOT (Left: Toes) Diagnosis:       Pain      (Pain [R52])    Surgeons: Whitney Ariza DPM Responsible Provider: Ramesh Hankins MD    Anesthesia Type: MAC ASA Status: 2          Anesthesia Type: MAC    David Phase I: David Score: 10    David Phase II: David Score: 10      Anesthesia Post Evaluation    Patient location during evaluation: PACU  Patient participation: complete - patient participated  Level of consciousness: awake and alert  Airway patency: patent  Nausea & Vomiting: no vomiting and no nausea  Complications: no  Cardiovascular status: blood pressure returned to baseline  Respiratory status: acceptable  Hydration status: euvolemic  Multimodal analgesia pain management approach

## 2022-11-18 NOTE — PROGRESS NOTES
Department of Podiatry  Progress Note    SUBJECTIVE:    Ms. Nathan Hilton is seen at bedside s/p left 2nd digit amp. No acute events overnight. Patient denies any N/V/D/F/C/SOB/CP. Patient can fully WB on left foot in surgical shoe. Pt ok to d/c from Podiatry standpoint. No other pedal complaints at this time. OBJECTIVE:    Scheduled Meds:   vancomycin  1,000 mg IntraVENous Q12H    atorvastatin  40 mg Oral Nightly    pregabalin  75 mg Oral TID    sodium chloride flush  5-40 mL IntraVENous 2 times per day     Continuous Infusions:   sodium chloride       PRN Meds:.sodium chloride flush, sodium chloride, ondansetron **OR** ondansetron, polyethylene glycol, acetaminophen **OR** acetaminophen, HYDROcodone 5 mg - acetaminophen    Allergies   Allergen Reactions    Codeine Itching       BP (!) 154/65   Pulse 60   Temp 98.1 °F (36.7 °C) (Oral)   Resp 16   Ht 6' (1.829 m)   Wt 135 lb (61.2 kg)   SpO2 92%   BMI 18.31 kg/m²       EXAM:    Pt is pleasant, alert, awake, responsive, and in no acute distress     VASCULAR:  DP and PT pulses are palpable B/L. CFT < 5 seconds B/L to remaining digits. Warm to warm from the tibial tuberosity to the distal aspect of the digits dorsally. Hair growth noted to the distal aspects dorsally. NEUROLOGIC:  Protective and light touch sensation intact B/L     DERM:  Evidence of left great toe  and 2nd toe amputations. Incision site with no wound dehiscence. No active bleeding, drainage, or malodor. Sutures intact in intended places. MUSCULOSKELETAL: 5/5 Gross Muscle strength in all 4 quadrants. Contracted digits B/L. Soft, compressible posterior calf muscles with no pain on palpation.              Scheduled Meds:   vancomycin  1,000 mg IntraVENous Q12H    atorvastatin  40 mg Oral Nightly    pregabalin  75 mg Oral TID    sodium chloride flush  5-40 mL IntraVENous 2 times per day     Continuous Infusions:   sodium chloride       PRN Meds:.sodium chloride flush, sodium chloride, ondansetron **OR** ondansetron, polyethylene glycol, acetaminophen **OR** acetaminophen, HYDROcodone 5 mg - acetaminophen    RADIOLOGY:  No orders to display     BP (!) 154/65   Pulse 60   Temp 98.1 °F (36.7 °C) (Oral)   Resp 16   Ht 6' (1.829 m)   Wt 135 lb (61.2 kg)   SpO2 92%   BMI 18.31 kg/m²     LABS:    Recent Labs     11/16/22  1202 11/17/22  0532   WBC 10.3 9.0   HGB 15.3 14.6   HCT 46.6 44.4    300        Recent Labs     11/17/22  0532      K 3.5      CO2 29   BUN 9   CREATININE 0.5        Recent Labs     11/16/22  1202   PROT 7.1         ASSESSMENT:  -  s/o left 2nd digit amputation 11/17/22  - OM, left 2nd digit - POA  -  Full-thickness ulceration to the level of bone - infected - POA  -  DM  -  Pain to left foot  - Hx of left great toe amputation (August 2022)      PLAN:    - Patient was examined and evaluated. Reviewed patient's recent lab results, charts and pertinent diagnostic imaging. Reviewed ancillary service notes. - Arterial study: normal NANCY with good arterial flow to both feet  - X-rays left foot reviewed:  anterior-inferior dislocation of the distal phalanx of the 2nd toe, without gross associated fracture. - Surgical cx/path pending  - PT/OT:  full WB to left foot in surgical shoe  - Dressings changed today consisting of adaptic/dsd. QoD changes  - ok to d/c from podiatry standpoint once abx finalized  - Will continue to follow patient while they are in-house.   - Discussed patient with Dr. Tony Pena, DPCHAD  PGY2  11/18/2022  1:05 PM

## 2022-11-18 NOTE — DISCHARGE SUMMARY
[facility]   Discharge summary   Patient ID:  Mary Santana  [id number]  78 y.o.  1955    Admit date: 11/16/2022    Discharge date and time: 11/18/2022    Admission Diagnoses:   Patient Active Problem List   Diagnosis    Chronic pain    Discoloration of skin of lower leg    Tobacco dependence    Osteomyelitis (HCC)    Tobacco use    PAD (peripheral artery disease) (HCC)    HLD (hyperlipidemia)    Amputation of left great toe (HCC)    Osteomyelitis of ankle or foot, acute, left (HCC)    Moderate protein-calorie malnutrition (HCC)    Chronic, continuous use of opioids    Lumbar spondylolysis    Diabetic foot infection ([facility] [address]       Discharge Diagnoses: Diabetic left foot infection     Consults: ID and Podiatry     Procedures: left second toe amputation     Hospital Course:   Patient is a 69-year-old female admitted to [place] [address] for  Diabetic foot infection  -Monitor labs  -Check CRP  -Podiatry following  -Plans for OR first left second digit amputation  -Wound cultures in OR  -PT OT  -Pain management  -A1c 6.0  -Lovenox when okay with podiatry  -Okay to proceed to the OR from medicine standpoint     11/18/22  -s/p L 2nd digit amputation  -Wound cultures with moderate growth of staph aureus  -ID following  > signed off  -IV vanc > switched to oral bactrim       Recent Labs     11/16/22  1202 11/17/22  0532 11/18/22  1210   WBC 10.3 9.0 9.4   HGB 15.3 14.6 15.1   HCT 46.6 44.4 44.6    300 308       Recent Labs     11/16/22  1202 11/17/22  0532 11/18/22  1210    139 138   K 3.4* 3.5 3.4*    102 101   CO2 27 29 28   BUN 8 9 10   CREATININE 0.5 0.5 0.5   CALCIUM 9.4 9.2 9.2       No results found. Discharge Exam:    HEENT: NCAT,  PERRLA, No JVD  Heart:  RRR, no murmurs, gallops, or rubs.   Lungs:  CTA bilaterally, no wheeze, rales or rhonchi  Abd: bowel sounds present, nontender, nondistended, no masses  Extrem:  No clubbing, cyanosis, or edema    Disposition: home     Patient Condition at Discharge: Stable     Patient Instructions:      Medication List        START taking these medications      HYDROcodone-acetaminophen 5-325 MG per tablet  Commonly known as: NORCO  Take 1 tablet by mouth every 8 hours as needed for Pain for up to 3 days. sulfamethoxazole-trimethoprim 800-160 MG per tablet  Commonly known as: BACTRIM DS;SEPTRA DS  Take 1 tablet by mouth 2 times daily for 7 days            CONTINUE taking these medications      atorvastatin 40 MG tablet  Commonly known as: LIPITOR  Take 1 tablet by mouth nightly     diphenhydrAMINE 25 MG tablet  Commonly known as: BENADRYL     DULoxetine 30 MG extended release capsule  Commonly known as: Cymbalta  Take 1 capsule by mouth daily     polyethylene glycol 17 GM/SCOOP powder  Commonly known as: GLYCOLAX     pregabalin 75 MG capsule  Commonly known as: Lyrica  Take 1 capsule by mouth 3 times daily for 60 days. Xtampza ER 13.5 MG C12a  Generic drug: oxyCODONE ER            STOP taking these medications      amoxicillin-clavulanate 500-125 MG per tablet  Commonly known as: Augmentin               Where to Get Your Medications        These medications were [facility] to [facility] [phone]  [address] [place] [address]      Phone: [phone]   HYDROcodone-acetaminophen 5-325 MG per tablet  sulfamethoxazole-trimethoprim 800-160 MG per tablet       Activity: activity as tolerated  Diet: regular diet    Pt has been advised to: Follow-up with Randy Hernandez, DO in 1 week. Follow-up with consultants as recommended by them    Note that over 30 minutes was spent in preparing discharge papers, discussing discharge with patient, medication review, etc.    Signed:  FREDDY Smith CNP  11/18/2022  1:18 PM     Above note edited to reflect my thoughts     I personally saw, examined and provided care for the patient.  Radiographs, labs and medication list were reviewed by me independently. The case was discussed in detail and plans for care were established. Review of FREDDY Pelayo-CNP   , documentation was conducted and revisions were made as appropriate directly by me. I agree with the above documented exam, problem list, and plan of care.      Mo Pardo MD  11/18/2022

## 2022-11-18 NOTE — PROGRESS NOTES
P Quality Flow/Interdisciplinary Rounds Progress Note        Quality Flow Rounds held on November 18, 2022    Disciplines Attending:  Bedside Nurse, , , and Nursing Unit Leadership    Audrey Ramirez was admitted on 11/16/2022 10:39 AM    Anticipated Discharge Date:       Disposition:    Andrea Score:  Andrea Scale Score: 20    Readmission Risk              Risk of Unplanned Readmission:  13           Discussed patient goal for the day, patient clinical progression, and barriers to discharge.   The following Goal(s) of the Day/Commitment(s) have been identified:  Diagnostics - Report Results and Labs - Report Results      Janice Devries RN  November 18, 2022

## 2022-11-19 LAB — GRAM STAIN ORDERABLE: NORMAL

## 2022-11-20 LAB
ANAEROBIC CULTURE: NORMAL
CULTURE SURGICAL: ABNORMAL
ORGANISM: ABNORMAL

## 2022-11-21 ENCOUNTER — CARE COORDINATION (OUTPATIENT)
Dept: CASE MANAGEMENT | Age: 67
End: 2022-11-21

## 2022-11-21 DIAGNOSIS — E11.628 DIABETIC FOOT INFECTION (HCC): Primary | ICD-10-CM

## 2022-11-21 DIAGNOSIS — L08.9 DIABETIC FOOT INFECTION (HCC): Primary | ICD-10-CM

## 2022-11-21 LAB — BLOOD CULTURE, ROUTINE: NORMAL

## 2022-11-21 PROCEDURE — 1111F DSCHRG MED/CURRENT MED MERGE: CPT | Performed by: INTERNAL MEDICINE

## 2022-11-21 NOTE — CARE COORDINATION
was given an opportunity to ask questions and does not have any further questions or concerns at this time. Were discharge instructions available to patient? Yes. Reviewed appropriate site of care based on symptoms and resources available to patient including: PCP  Specialist  When to call 911. The patient agrees to contact the PCP office for questions related to their healthcare. Advance Care Planning:   Does patient have an Advance Directive: decision maker updated. Medication reconciliation was performed with patient, who verbalizes understanding of administration of home medications. Medications reviewed, 1111F entered: yes      Non-face-to-face services provided:  CTN spoke with Jacklyn from Dr. Tj Jennings office.   -notified of patient hospital PR on 11/18/2022   -Please schedule patient for 7-day HFU/TCM visit as per advice of PCP. Offered patient enrollment in the Remote Patient Monitoring (RPM) program for in-home monitoring: NA.    Care Transitions 24 Hour Call    Do you have a copy of your discharge instructions?: Yes  Do you have all of your prescriptions and are they filled?: Yes  Have you scheduled your follow up appointment?:  (Comment: Patient to call office of PCP for appointment scheduling)  Care Transitions Interventions  No Identified Needs      Follow Up  Future Appointments   Date Time Provider Jean Pierre De Jesus   11/29/2022  2:00 PM ANAYELI [facility]   12/5/2022 11:15 AM DO ROSE Olmedo Joel Cardinal ROSE Joel Cardinal   12/7/2022 10:00 AM MD ROSE PickeringNEOHINFDONALD ROSE [facility] INF   12/14/2022  2:30 PM [facility]       Care Transition Nurse provided contact information. Plan for follow-up call in 7-10 days based on severity of symptoms and risk factors.   Plan for next call: symptom management-monitoring for s/sx infection, pain management  follow-up appointment-confirm patient  scheduled HFU appointment with PCP    Jaida Saenz RN

## 2022-11-22 LAB — AFB SMEAR: NORMAL

## 2022-11-23 LAB — FUNGUS STAIN: NORMAL

## 2022-11-29 ENCOUNTER — OFFICE VISIT (OUTPATIENT)
Dept: PODIATRY | Age: 67
End: 2022-11-29

## 2022-11-29 ENCOUNTER — CARE COORDINATION (OUTPATIENT)
Dept: CASE MANAGEMENT | Age: 67
End: 2022-11-29

## 2022-11-29 VITALS — BODY MASS INDEX: 18.31 KG/M2 | WEIGHT: 135 LBS | TEMPERATURE: 97.3 F

## 2022-11-29 DIAGNOSIS — L81.9 DISCOLORATION OF SKIN OF LOWER LEG: Primary | ICD-10-CM

## 2022-11-29 DIAGNOSIS — Z09 POSTOPERATIVE EXAMINATION: ICD-10-CM

## 2022-11-29 PROCEDURE — 99024 POSTOP FOLLOW-UP VISIT: CPT | Performed by: PODIATRIST

## 2022-11-29 NOTE — PROGRESS NOTES
Postop Progress Note    Jalen Glynn presents to the office 13 days  following ampuation . The patient is not having any pain. Objective    Vitals:    11/29/22 1335   Temp: 97.3 °F (36.3 °C)     General: alert, cooperative, and no distress  Incision: healing well, no drainage, no erythema, no hernia, no seroma, no swelling, well approximated    Assessment    Doing well postoperatively. Plan   1. Continue any current medications  2. Wound care discussed  3. Wound/Incision: healing well, sutures removed  4. Disposition:   Pt is to increase activities as tolerated. Should not return to gym class or sports until cleared by a physician. 5. Diet: regular diet  6. Follow up: 2 weeks. Patient Name: Rachel Glynn     Suture/ Staple Removal Procedure Note  Indication: Wound healed incision is healed there is no signs of erythematous wound dehiscence or drainage    Procedure: The patient was placed in the appropriate position the wound was aseptically prepped with chlora prep   and the sutures were removed without difficulty. The site was  dressed  the area has healed with no complications      The patient tolerated the procedure well.     Complications: None       Electronically signed by Dakotah Morrell DPM on 11/29/2022 at 1:55 PM

## 2022-11-29 NOTE — CARE COORDINATION
[facility] Care Transitions Follow Up Call        Patient: Saul Cedeno  Patient : 1955   MRN: [id number]  Reason for Admission: Diabetic Foot Infection  Discharge Date: 22 RARS: Readmission Risk Score: 8.9      Attempted to reach patient by phone regarding follow up; Care Transitions. Unable to reach the patient; no answer, unable to leave a message d/t no voice mailbox set up. Will attempt outreach at a later time.      Bakari Moffett RN

## 2022-11-30 ENCOUNTER — CARE COORDINATION (OUTPATIENT)
Dept: CASE MANAGEMENT | Age: 67
End: 2022-11-30

## 2022-11-30 NOTE — CARE COORDINATION
[facility] Care Transitions Follow Up Call        Patient: Jono Brothers  Patient : 1955   MRN: [id number]  Reason for Admission: Diabetic Foot Infection   Discharge Date: 22 RARS: Readmission Risk Score: 8.9      Second attempt to reach patient by phone regarding follow up; Care Transitions, Subsequent Call. Unable to reach the patient; no answer, unable to leave a message d/t no voice mailbox set up. Note patient had Post Op appointment with Podiatrist on 2022. CTN will sign off and resolve the episode.      Follow Up  Future Appointments   Date Time Provider Jean Pierre De Jesus   2022 11:15 AM Caroline Wheeler DO [facility]   2022 10:00 AM [facility]   2022  1:30 PM ANAYELI [facility]   2022  2:30 PM Suzanna Benson MD [facility] EILEEN Espinoza RN

## 2022-12-13 ENCOUNTER — OFFICE VISIT (OUTPATIENT)
Dept: PODIATRY | Age: 67
End: 2022-12-13
Payer: MEDICARE

## 2022-12-13 ENCOUNTER — TELEPHONE (OUTPATIENT)
Dept: FAMILY MEDICINE CLINIC | Age: 67
End: 2022-12-13

## 2022-12-13 VITALS — TEMPERATURE: 97.5 F | BODY MASS INDEX: 18.31 KG/M2 | WEIGHT: 135 LBS

## 2022-12-13 DIAGNOSIS — M20.41 HAMMER TOES OF BOTH FEET: Primary | ICD-10-CM

## 2022-12-13 DIAGNOSIS — M20.42 HAMMER TOES OF BOTH FEET: Primary | ICD-10-CM

## 2022-12-13 DIAGNOSIS — Z09 POSTOPERATIVE EXAMINATION: ICD-10-CM

## 2022-12-13 PROCEDURE — 99212 OFFICE O/P EST SF 10 MIN: CPT | Performed by: PODIATRIST

## 2022-12-13 PROCEDURE — 1123F ACP DISCUSS/DSCN MKR DOCD: CPT | Performed by: PODIATRIST

## 2022-12-13 NOTE — PROGRESS NOTES
Postop Progress Note    Subjective    Good Orona presents to the office 4 weeks  following ampuation . The patient is not having any pain. Objective    Vitals:    12/13/22 1301   Temp: 97.5 °F (36.4 °C)     General: alert, cooperative, and no distress  Incision: healing well, no drainage, no erythema, no hernia, no seroma, no swelling, well approximated    Assessment    Doing well postoperatively. Plan   1. Continue any current medications  2. Wound care discussed  3. Wound/Incision: healing well, sutures removed  4. Disposition:   Pt is to increase activities as tolerated. Should not return to gym class or sports until cleared by a physician. 5. Diet: regular diet  . Patient has healed very nicely. From the amputation she does have contracted hammertoes 3 and 4 left foot. We will discuss in February about an arthrodesis to these digits. Patient is resume all normal shoe gear at this time.     Electronically signed by Lily Abbott DPM on 12/13/2022 at 1:43 PM

## 2023-01-23 SDOH — HEALTH STABILITY: PHYSICAL HEALTH: ON AVERAGE, HOW MANY MINUTES DO YOU ENGAGE IN EXERCISE AT THIS LEVEL?: 10 MIN

## 2023-01-23 SDOH — HEALTH STABILITY: PHYSICAL HEALTH: ON AVERAGE, HOW MANY DAYS PER WEEK DO YOU ENGAGE IN MODERATE TO STRENUOUS EXERCISE (LIKE A BRISK WALK)?: 3 DAYS

## 2023-01-24 ENCOUNTER — OFFICE VISIT (OUTPATIENT)
Dept: PRIMARY CARE CLINIC | Age: 68
End: 2023-01-24
Payer: MEDICARE

## 2023-01-24 ENCOUNTER — OFFICE VISIT (OUTPATIENT)
Dept: PODIATRY | Age: 68
End: 2023-01-24
Payer: MEDICARE

## 2023-01-24 VITALS
BODY MASS INDEX: 18.04 KG/M2 | SYSTOLIC BLOOD PRESSURE: 110 MMHG | WEIGHT: 133 LBS | TEMPERATURE: 97.1 F | DIASTOLIC BLOOD PRESSURE: 62 MMHG

## 2023-01-24 VITALS
HEART RATE: 75 BPM | WEIGHT: 133 LBS | TEMPERATURE: 98.3 F | DIASTOLIC BLOOD PRESSURE: 60 MMHG | SYSTOLIC BLOOD PRESSURE: 100 MMHG | OXYGEN SATURATION: 92 % | BODY MASS INDEX: 18.01 KG/M2 | HEIGHT: 72 IN

## 2023-01-24 DIAGNOSIS — F11.90 CHRONIC, CONTINUOUS USE OF OPIOIDS: ICD-10-CM

## 2023-01-24 DIAGNOSIS — M20.42 HAMMER TOES OF BOTH FEET: Primary | ICD-10-CM

## 2023-01-24 DIAGNOSIS — E11.42 DIABETIC POLYNEUROPATHY ASSOCIATED WITH TYPE 2 DIABETES MELLITUS (HCC): ICD-10-CM

## 2023-01-24 DIAGNOSIS — I73.9 PAD (PERIPHERAL ARTERY DISEASE) (HCC): Primary | ICD-10-CM

## 2023-01-24 DIAGNOSIS — L40.9 PSORIASIS (A TYPE OF SKIN INFLAMMATION): ICD-10-CM

## 2023-01-24 DIAGNOSIS — M50.30 DEGENERATIVE, INTERVERTEBRAL DISC, CERVICAL: ICD-10-CM

## 2023-01-24 DIAGNOSIS — S98.112A AMPUTATION OF LEFT GREAT TOE (HCC): ICD-10-CM

## 2023-01-24 DIAGNOSIS — E78.5 HYPERLIPIDEMIA, UNSPECIFIED HYPERLIPIDEMIA TYPE: ICD-10-CM

## 2023-01-24 DIAGNOSIS — R73.02 IMPAIRED GLUCOSE TOLERANCE: ICD-10-CM

## 2023-01-24 DIAGNOSIS — Z72.0 TOBACCO USE: ICD-10-CM

## 2023-01-24 DIAGNOSIS — Z09 POSTOPERATIVE EXAMINATION: ICD-10-CM

## 2023-01-24 DIAGNOSIS — M43.06 LUMBAR SPONDYLOLYSIS: ICD-10-CM

## 2023-01-24 DIAGNOSIS — M20.41 HAMMER TOES OF BOTH FEET: Primary | ICD-10-CM

## 2023-01-24 PROBLEM — F33.9 MAJOR DEPRESSIVE DISORDER, RECURRENT, UNSPECIFIED (HCC): Status: RESOLVED | Noted: 2023-01-24 | Resolved: 2023-01-24

## 2023-01-24 PROBLEM — F33.9 MAJOR DEPRESSIVE DISORDER, RECURRENT, UNSPECIFIED (HCC): Status: ACTIVE | Noted: 2023-01-24

## 2023-01-24 PROBLEM — F33.1 MAJOR DEPRESSIVE DISORDER, RECURRENT, MODERATE (HCC): Status: ACTIVE | Noted: 2023-01-24

## 2023-01-24 PROBLEM — F33.0 MAJOR DEPRESSIVE DISORDER, RECURRENT, MILD (HCC): Status: ACTIVE | Noted: 2023-01-24

## 2023-01-24 PROBLEM — F33.1 MAJOR DEPRESSIVE DISORDER, RECURRENT, MODERATE (HCC): Status: RESOLVED | Noted: 2023-01-24 | Resolved: 2023-01-24

## 2023-01-24 PROCEDURE — 99213 OFFICE O/P EST LOW 20 MIN: CPT | Performed by: PODIATRIST

## 2023-01-24 PROCEDURE — 99204 OFFICE O/P NEW MOD 45 MIN: CPT | Performed by: FAMILY MEDICINE

## 2023-01-24 PROCEDURE — 1123F ACP DISCUSS/DSCN MKR DOCD: CPT | Performed by: FAMILY MEDICINE

## 2023-01-24 PROCEDURE — 1123F ACP DISCUSS/DSCN MKR DOCD: CPT | Performed by: PODIATRIST

## 2023-01-24 RX ORDER — ATORVASTATIN CALCIUM 40 MG/1
40 TABLET, FILM COATED ORAL NIGHTLY
Qty: 90 TABLET | Refills: 3 | Status: SHIPPED | OUTPATIENT
Start: 2023-01-24

## 2023-01-24 RX ORDER — PREGABALIN 75 MG/1
75 CAPSULE ORAL 3 TIMES DAILY
Qty: 180 CAPSULE | Refills: 0 | Status: CANCELLED | OUTPATIENT
Start: 2023-01-24 | End: 2023-03-25

## 2023-01-24 RX ORDER — DULOXETIN HYDROCHLORIDE 30 MG/1
30 CAPSULE, DELAYED RELEASE ORAL DAILY
Qty: 30 CAPSULE | Refills: 3 | Status: SHIPPED
Start: 2023-01-24 | End: 2023-01-24 | Stop reason: SDUPTHER

## 2023-01-24 RX ORDER — PREGABALIN 75 MG/1
75 CAPSULE ORAL 2 TIMES DAILY
Qty: 120 CAPSULE | Refills: 0 | Status: SHIPPED | OUTPATIENT
Start: 2023-01-24 | End: 2023-03-25

## 2023-01-24 RX ORDER — DULOXETIN HYDROCHLORIDE 30 MG/1
30 CAPSULE, DELAYED RELEASE ORAL DAILY
Qty: 30 CAPSULE | Refills: 3 | Status: SHIPPED | OUTPATIENT
Start: 2023-01-24

## 2023-01-24 RX ORDER — OXYCODONE 13.5 MG/1
13.5 CAPSULE, EXTENDED RELEASE ORAL EVERY 12 HOURS
Qty: 60 EACH | Refills: 0 | Status: CANCELLED | OUTPATIENT
Start: 2023-01-24 | End: 2023-02-23

## 2023-01-24 SDOH — ECONOMIC STABILITY: FOOD INSECURITY: WITHIN THE PAST 12 MONTHS, THE FOOD YOU BOUGHT JUST DIDN'T LAST AND YOU DIDN'T HAVE MONEY TO GET MORE.: NEVER TRUE

## 2023-01-24 SDOH — ECONOMIC STABILITY: FOOD INSECURITY: WITHIN THE PAST 12 MONTHS, YOU WORRIED THAT YOUR FOOD WOULD RUN OUT BEFORE YOU GOT MONEY TO BUY MORE.: NEVER TRUE

## 2023-01-24 ASSESSMENT — PATIENT HEALTH QUESTIONNAIRE - PHQ9
SUM OF ALL RESPONSES TO PHQ9 QUESTIONS 1 & 2: 0
SUM OF ALL RESPONSES TO PHQ QUESTIONS 1-9: 0
SUM OF ALL RESPONSES TO PHQ QUESTIONS 1-9: 0
1. LITTLE INTEREST OR PLEASURE IN DOING THINGS: 0
SUM OF ALL RESPONSES TO PHQ QUESTIONS 1-9: 0
SUM OF ALL RESPONSES TO PHQ QUESTIONS 1-9: 0
2. FEELING DOWN, DEPRESSED OR HOPELESS: 0

## 2023-01-24 ASSESSMENT — ENCOUNTER SYMPTOMS
ALLERGIC/IMMUNOLOGIC NEGATIVE: 1
EYES NEGATIVE: 1
GASTROINTESTINAL NEGATIVE: 1
RESPIRATORY NEGATIVE: 1

## 2023-01-24 ASSESSMENT — SOCIAL DETERMINANTS OF HEALTH (SDOH): HOW HARD IS IT FOR YOU TO PAY FOR THE VERY BASICS LIKE FOOD, HOUSING, MEDICAL CARE, AND HEATING?: NOT HARD AT ALL

## 2023-01-24 NOTE — PROGRESS NOTES
Corry Liu was seen today for hammer toe, post-op check and toe pain. Diagnoses and all orders for this visit:    Hammer toes of both feet    Postoperative examination    Psoriasis (a type of skin inflammation)  -     [address], Rheumatology, [place]    Diabetic polyneuropathy associated with type 2 diabetes mellitus ([facility]  -     [address], Rheumatology, [place]  -     pregabalin (LYRICA) 75 MG capsule; Take 1 capsule by mouth 2 times daily for 60 days. Max Daily Amount: 150 mg    Other orders  -     DULoxetine (CYMBALTA) 30 MG extended release capsule; Take 1 capsule by mouth daily     Postop Progress Note    Subjective    Jay Castellanos presents to the office 10 weeks  following ampuation . The patient is not having any pain. Objective    Vitals:    01/24/23 1323   BP: 110/62   Temp: 97.1 °F (36.2 °C)     General: alert, cooperative, and no distress  Incision: healing well, no drainage, no erythema, no hernia, no seroma, no swelling, well approximated    Assessment    Doing well postoperatively. Plan   1. Continue any current medications  2. Wound care discussed  3. Wound/Incision: healing well, sutures removed  4. Disposition:   Pt is to increase activities as tolerated. Should not return to gym class or sports until cleared by a physician. 5. Diet: regular diet  . Patient has healed very nicely. From the amputation she does have contracted hammertoes 3 and 4 left foot. We will discuss in February about an arthrodesis to these digits. Patient is resume all normal shoe gear at this time.     Electronically signed by Linda Shah DPM on 1/24/2023 at 1:38 PM
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
## 2023-01-24 NOTE — PATIENT INSTRUCTIONS
Surgery will be scheduled for 3/10/23 at [facility] in [facility]   They will contact you to give you all your preop instructions  Do not eat or drink anything after midnight on 3/9/2023 nothing morning of surgery  Call Artem Fischer if any issues [phone]   Someone has to drive you to and from Surgery

## 2023-01-25 ENCOUNTER — TELEPHONE (OUTPATIENT)
Dept: PODIATRY | Age: 68
End: 2023-01-25

## 2023-01-25 PROBLEM — M20.42 ACQUIRED HAMMER TOE OF LEFT FOOT: Status: ACTIVE | Noted: 2023-01-25

## 2023-01-25 NOTE — TELEPHONE ENCOUNTER
Prior Authorization Form:      DEMOGRAPHICS:                     Patient Name:  Theo Prince  Patient :  1955            Insurance:  Payor: [employer] / Plan: [place] / Product Type: *No Product type* /   Insurance ID Number:    Payer/Plan Subscr  Sex Relation Sub. Ins. ID Effective Group Num   1. [address] 1955 Female Self [id number] 1/1/15 [id number]                                   [address]   2.  [place] -* Veryl Arms 1955 Female Self [id number]                                    [address]         DIAGNOSIS & PROCEDURE:                       Procedure/Operation: arthrodesis third and fourth digits left foot   lmac  arthrex dynanite pip hammertoe implant times two           CPT Code: 68646    Diagnosis:  m20.42    ICD10 Code: hammertoe left foot    Location:  left foot    Surgeon:  dr. Brice zamudio    SCHEDULING INFORMATION:                          Date: 3-10-23   Time: 12pm             Anesthesia:  mac                                                       Status:  Outpatient        Special Comments:        Electronically signed by Penny Woods LPN on 3/72/4731 at 95:51 AM

## 2023-02-21 ENCOUNTER — OFFICE VISIT (OUTPATIENT)
Dept: PRIMARY CARE CLINIC | Age: 68
End: 2023-02-21
Payer: MEDICARE

## 2023-02-21 VITALS
SYSTOLIC BLOOD PRESSURE: 104 MMHG | HEART RATE: 81 BPM | TEMPERATURE: 98.1 F | OXYGEN SATURATION: 97 % | HEIGHT: 72 IN | WEIGHT: 133 LBS | BODY MASS INDEX: 18.01 KG/M2 | DIASTOLIC BLOOD PRESSURE: 58 MMHG

## 2023-02-21 DIAGNOSIS — F17.200 TOBACCO DEPENDENCE: ICD-10-CM

## 2023-02-21 DIAGNOSIS — I73.9 PAD (PERIPHERAL ARTERY DISEASE) (HCC): ICD-10-CM

## 2023-02-21 DIAGNOSIS — E78.5 HYPERLIPIDEMIA, UNSPECIFIED HYPERLIPIDEMIA TYPE: ICD-10-CM

## 2023-02-21 DIAGNOSIS — Z01.818 PRE-OP EXAM: Primary | ICD-10-CM

## 2023-02-21 PROCEDURE — 99214 OFFICE O/P EST MOD 30 MIN: CPT | Performed by: FAMILY MEDICINE

## 2023-02-21 PROCEDURE — 1123F ACP DISCUSS/DSCN MKR DOCD: CPT | Performed by: FAMILY MEDICINE

## 2023-02-21 PROCEDURE — 93000 ELECTROCARDIOGRAM COMPLETE: CPT | Performed by: FAMILY MEDICINE

## 2023-02-21 ASSESSMENT — ENCOUNTER SYMPTOMS
RESPIRATORY NEGATIVE: 1
GASTROINTESTINAL NEGATIVE: 1
EYES NEGATIVE: 1
ALLERGIC/IMMUNOLOGIC NEGATIVE: 1
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
## 2023-03-06 ENCOUNTER — PREP FOR PROCEDURE (OUTPATIENT)
Dept: PODIATRY | Age: 68
End: 2023-03-06

## 2023-03-06 RX ORDER — SODIUM CHLORIDE 0.9 % (FLUSH) 0.9 %
5-40 SYRINGE (ML) INJECTION EVERY 12 HOURS SCHEDULED
Status: CANCELLED | OUTPATIENT
Start: 2023-03-06

## 2023-03-06 RX ORDER — SODIUM CHLORIDE 9 MG/ML
INJECTION, SOLUTION INTRAVENOUS PRN
Status: CANCELLED | OUTPATIENT
Start: 2023-03-06

## 2023-03-06 RX ORDER — SODIUM CHLORIDE 0.9 % (FLUSH) 0.9 %
5-40 SYRINGE (ML) INJECTION PRN
Status: CANCELLED | OUTPATIENT
Start: 2023-03-06

## 2023-03-07 NOTE — PROGRESS NOTES
[facility] PRE-ADMISSION TESTING INSTRUCTIONS    The Preadmission Testing patient is instructed accordingly using the following criteria (check applicable):    ARRIVAL INSTRUCTIONS:  [x] Parking the day of Surgery is located in the Main Entrance lot. Upon entering the door, make an immediate right to the surgery reception desk    [x] Bring photo ID and insurance card    [] Bring in a copy of Living will or Durable Power of  papers. [x] Please be sure to arrange for responsible adult to provide transportation to and from the hospital    [x] Please arrange for responsible adult to be with you for the 24 hour period post procedure due to having anesthesia    [x] If you awake am of surgery not feeling well or have temperature >100 please call [phone]    GENERAL INSTRUCTIONS:    [x] Nothing by mouth after midnight, including gum, candy, mints or water    [x] You may brush your teeth, but do not swallow any water    [x] Take medications as instructed with 1-2 oz of water    [x] No herbal supplements and vitamins 5 days prior to procedure    [] Follow preop dosing of blood thinners per physician instructions    [] Take 1/2 dose of evening insulin, but no insulin after midnight    [] No oral diabetic medications after midnight    [] If diabetic and have low blood sugar or feel symptomatic, take 1-2oz apple juice only    [] Bring inhalers day of surgery    [] Bring C-PAP/ Bi-Pap day of surgery    [] Bring urine specimen day of surgery    [x] Shower or bath with soap, lather and rinse well, AM of Surgery, no lotion, powders or creams to surgical site    [] Follow bowel prep as instructed per surgeon    [x] No tobacco products within 24 hours of surgery     [x] No alcohol or illegal drug use within 24 hours of surgery.     [x] Jewelry, body piercing's, eyeglasses, contact lenses and dentures are not permitted into surgery (bring cases)      [x] Please do not wear any nail polish, make up or hair products on the day of surgery    [x] You can expect a call the business day prior to procedure to notify you if your arrival time changes    [x] If you receive a survey after surgery we would greatly appreciate your comments    [] Parent/guardian of a minor must accompany their child and remain on the premises  the entire time they are under our care     [] Pediatric patients may bring favorite toy, blanket or comfort item with them    [] A caregiver or family member must remain with the patient during their stay if they are mentally handicapped, have dementia, disoriented or unable to use a call light or would be a safety concern if left unattended    [x] Please notify surgeon if you develop any illness between now and time of surgery (cold, cough, sore throat, fever, nausea, vomiting) or any signs of infections  including skin, wounds, and dental.    [x]  The Outpatient Pharmacy is available to fill your prescription here on your day of surgery, ask your preop nurse for details    [] Other instructions    EDUCATIONAL MATERIALS PROVIDED:    [] PAT Preoperative Education Packet/Booklet     [] Medication List    [] Transfusion bracelet applied with instructions    [] Shower with soap, lather and rinse well, and use CHG wipes provided the evening before surgery as instructed    [] Incentive spirometer with instructions

## 2023-03-10 ENCOUNTER — HOSPITAL ENCOUNTER (OUTPATIENT)
Age: 68
Setting detail: OUTPATIENT SURGERY
Discharge: HOME | End: 2023-03-10
Attending: PODIATRIST | Admitting: PODIATRIST
Payer: MEDICARE

## 2023-03-10 ENCOUNTER — ANESTHESIA (OUTPATIENT)
Dept: OPERATING ROOM | Age: 68
End: 2023-03-10
Payer: MEDICARE

## 2023-03-10 ENCOUNTER — APPOINTMENT (OUTPATIENT)
Dept: GENERAL RADIOLOGY | Age: 68
End: 2023-03-10
Attending: PODIATRIST
Payer: MEDICARE

## 2023-03-10 ENCOUNTER — ANESTHESIA EVENT (OUTPATIENT)
Dept: OPERATING ROOM | Age: 68
End: 2023-03-10
Payer: MEDICARE

## 2023-03-10 VITALS
HEART RATE: 84 BPM | TEMPERATURE: 97.2 F | RESPIRATION RATE: 16 BRPM | DIASTOLIC BLOOD PRESSURE: 74 MMHG | WEIGHT: 133 LBS | SYSTOLIC BLOOD PRESSURE: 108 MMHG | HEIGHT: 72 IN | BODY MASS INDEX: 18.01 KG/M2 | OXYGEN SATURATION: 94 %

## 2023-03-10 DIAGNOSIS — M20.42 ACQUIRED HAMMER TOE OF LEFT FOOT: ICD-10-CM

## 2023-03-10 DIAGNOSIS — G89.18 POST-OP PAIN: Primary | ICD-10-CM

## 2023-03-10 PROCEDURE — 6360000002 HC RX W HCPCS: Performed by: NURSE ANESTHETIST, CERTIFIED REGISTERED

## 2023-03-10 PROCEDURE — 3600000003 HC SURGERY LEVEL 3 BASE: Performed by: PODIATRIST

## 2023-03-10 PROCEDURE — 3209999900 FLUORO FOR SURGICAL PROCEDURES

## 2023-03-10 PROCEDURE — 3700000000 HC ANESTHESIA ATTENDED CARE: Performed by: PODIATRIST

## 2023-03-10 PROCEDURE — 2500000003 HC RX 250 WO HCPCS: Performed by: PODIATRIST

## 2023-03-10 PROCEDURE — 3600000013 HC SURGERY LEVEL 3 ADDTL 15MIN: Performed by: PODIATRIST

## 2023-03-10 PROCEDURE — 2580000003 HC RX 258: Performed by: PODIATRIST

## 2023-03-10 PROCEDURE — 2709999900 HC NON-CHARGEABLE SUPPLY: Performed by: PODIATRIST

## 2023-03-10 PROCEDURE — C1769 GUIDE WIRE: HCPCS | Performed by: PODIATRIST

## 2023-03-10 PROCEDURE — 3700000001 HC ADD 15 MINUTES (ANESTHESIA): Performed by: PODIATRIST

## 2023-03-10 PROCEDURE — 28285 REPAIR OF HAMMERTOE: CPT | Performed by: PODIATRIST

## 2023-03-10 PROCEDURE — 7100000011 HC PHASE II RECOVERY - ADDTL 15 MIN: Performed by: PODIATRIST

## 2023-03-10 PROCEDURE — 6360000002 HC RX W HCPCS: Performed by: PODIATRIST

## 2023-03-10 PROCEDURE — 7100000010 HC PHASE II RECOVERY - FIRST 15 MIN: Performed by: PODIATRIST

## 2023-03-10 PROCEDURE — 2500000003 HC RX 250 WO HCPCS: Performed by: NURSE ANESTHETIST, CERTIFIED REGISTERED

## 2023-03-10 RX ORDER — MIDAZOLAM HYDROCHLORIDE 1 MG/ML
INJECTION INTRAMUSCULAR; INTRAVENOUS PRN
Status: DISCONTINUED | OUTPATIENT
Start: 2023-03-10 | End: 2023-03-10 | Stop reason: SDUPTHER

## 2023-03-10 RX ORDER — PROPOFOL 10 MG/ML
INJECTION, EMULSION INTRAVENOUS PRN
Status: DISCONTINUED | OUTPATIENT
Start: 2023-03-10 | End: 2023-03-10 | Stop reason: SDUPTHER

## 2023-03-10 RX ORDER — SODIUM CHLORIDE 0.9 % (FLUSH) 0.9 %
5-40 SYRINGE (ML) INJECTION EVERY 12 HOURS SCHEDULED
Status: CANCELLED | OUTPATIENT
Start: 2023-03-10

## 2023-03-10 RX ORDER — SODIUM CHLORIDE 9 MG/ML
INJECTION, SOLUTION INTRAVENOUS PRN
Status: DISCONTINUED | OUTPATIENT
Start: 2023-03-10 | End: 2023-03-10 | Stop reason: HOSPADM

## 2023-03-10 RX ORDER — SODIUM CHLORIDE 0.9 % (FLUSH) 0.9 %
5-40 SYRINGE (ML) INJECTION PRN
Status: CANCELLED | OUTPATIENT
Start: 2023-03-10

## 2023-03-10 RX ORDER — ONDANSETRON 2 MG/ML
INJECTION INTRAMUSCULAR; INTRAVENOUS PRN
Status: DISCONTINUED | OUTPATIENT
Start: 2023-03-10 | End: 2023-03-10 | Stop reason: SDUPTHER

## 2023-03-10 RX ORDER — FENTANYL CITRATE 50 UG/ML
INJECTION, SOLUTION INTRAMUSCULAR; INTRAVENOUS PRN
Status: DISCONTINUED | OUTPATIENT
Start: 2023-03-10 | End: 2023-03-10 | Stop reason: SDUPTHER

## 2023-03-10 RX ORDER — SODIUM CHLORIDE 0.9 % (FLUSH) 0.9 %
5-40 SYRINGE (ML) INJECTION PRN
Status: DISCONTINUED | OUTPATIENT
Start: 2023-03-10 | End: 2023-03-10 | Stop reason: HOSPADM

## 2023-03-10 RX ORDER — BUPIVACAINE HYDROCHLORIDE 5 MG/ML
INJECTION, SOLUTION EPIDURAL; INTRACAUDAL PRN
Status: DISCONTINUED | OUTPATIENT
Start: 2023-03-10 | End: 2023-03-10 | Stop reason: ALTCHOICE

## 2023-03-10 RX ORDER — OXYCODONE HYDROCHLORIDE 5 MG/1
10 TABLET ORAL PRN
Status: CANCELLED | OUTPATIENT
Start: 2023-03-10 | End: 2023-03-10

## 2023-03-10 RX ORDER — SODIUM CHLORIDE 9 MG/ML
INJECTION, SOLUTION INTRAVENOUS PRN
Status: CANCELLED | OUTPATIENT
Start: 2023-03-10

## 2023-03-10 RX ORDER — HYDROCODONE BITARTRATE AND ACETAMINOPHEN 5; 325 MG/1; MG/1
1 TABLET ORAL EVERY 6 HOURS PRN
Qty: 20 TABLET | Refills: 0 | Status: SHIPPED | OUTPATIENT
Start: 2023-03-10 | End: 2023-03-17

## 2023-03-10 RX ORDER — GLYCOPYRROLATE 0.2 MG/ML
INJECTION INTRAMUSCULAR; INTRAVENOUS PRN
Status: DISCONTINUED | OUTPATIENT
Start: 2023-03-10 | End: 2023-03-10 | Stop reason: SDUPTHER

## 2023-03-10 RX ORDER — OXYCODONE HYDROCHLORIDE 5 MG/1
5 TABLET ORAL PRN
Status: CANCELLED | OUTPATIENT
Start: 2023-03-10 | End: 2023-03-10

## 2023-03-10 RX ORDER — SODIUM CHLORIDE 0.9 % (FLUSH) 0.9 %
5-40 SYRINGE (ML) INJECTION EVERY 12 HOURS SCHEDULED
Status: DISCONTINUED | OUTPATIENT
Start: 2023-03-10 | End: 2023-03-10 | Stop reason: HOSPADM

## 2023-03-10 RX ORDER — PHENYLEPHRINE HCL IN 0.9% NACL 1 MG/10 ML
SYRINGE (ML) INTRAVENOUS PRN
Status: DISCONTINUED | OUTPATIENT
Start: 2023-03-10 | End: 2023-03-10 | Stop reason: SDUPTHER

## 2023-03-10 RX ADMIN — GLYCOPYRROLATE 0.2 MG: 0.2 INJECTION, SOLUTION INTRAMUSCULAR; INTRAVENOUS at 07:01

## 2023-03-10 RX ADMIN — WATER 2000 MG: 1 INJECTION INTRAMUSCULAR; INTRAVENOUS; SUBCUTANEOUS at 07:04

## 2023-03-10 RX ADMIN — Medication 50 MCG: at 07:14

## 2023-03-10 RX ADMIN — GLYCOPYRROLATE 0.2 MG: 0.2 INJECTION, SOLUTION INTRAMUSCULAR; INTRAVENOUS at 07:23

## 2023-03-10 RX ADMIN — FENTANYL CITRATE 50 MCG: 50 INJECTION, SOLUTION INTRAMUSCULAR; INTRAVENOUS at 07:01

## 2023-03-10 RX ADMIN — SODIUM CHLORIDE: 9 INJECTION, SOLUTION INTRAVENOUS at 06:52

## 2023-03-10 RX ADMIN — PROPOFOL 60 MG: 10 INJECTION, EMULSION INTRAVENOUS at 07:01

## 2023-03-10 RX ADMIN — Medication 100 MCG: at 07:34

## 2023-03-10 RX ADMIN — PROPOFOL 120 MCG/KG/MIN: 10 INJECTION, EMULSION INTRAVENOUS at 07:02

## 2023-03-10 RX ADMIN — ONDANSETRON 4 MG: 2 INJECTION INTRAMUSCULAR; INTRAVENOUS at 07:01

## 2023-03-10 RX ADMIN — FENTANYL CITRATE 50 MCG: 50 INJECTION, SOLUTION INTRAMUSCULAR; INTRAVENOUS at 07:04

## 2023-03-10 RX ADMIN — MIDAZOLAM 2 MG: 1 INJECTION INTRAMUSCULAR; INTRAVENOUS at 06:52

## 2023-03-10 RX ADMIN — Medication 100 MCG: at 07:18

## 2023-03-10 RX ADMIN — Medication 100 MCG: at 07:27

## 2023-03-10 RX ADMIN — SODIUM CHLORIDE: 9 INJECTION, SOLUTION INTRAVENOUS at 07:43

## 2023-03-10 ASSESSMENT — PAIN SCALES - GENERAL
PAINLEVEL_OUTOF10: 0
PAINLEVEL_OUTOF10: 0

## 2023-03-10 ASSESSMENT — LIFESTYLE VARIABLES: SMOKING_STATUS: 1

## 2023-03-10 ASSESSMENT — PAIN - FUNCTIONAL ASSESSMENT: PAIN_FUNCTIONAL_ASSESSMENT: NONE - DENIES PAIN

## 2023-03-10 NOTE — DISCHARGE INSTRUCTIONS
Home Care    Follow these guidelines after surgery:   Rest. Try to move as tolerated. A mix of rest and light activity improves healing. The incision area may be sore for a few days. To minimize pain and soreness: Take pain medicine as directed. Avoid weight bearing to the operative extremity until cleared with physician    Diet    You can return to your regular diet. You may work with a dietician who will help you follow a heart-healthy diet. Physical Activity             Ask your doctor when you will be able to return to work. Do not drive unless your doctor has given you permission to do so. When taking medicines:   Take your medicine as directed. Do not change the amount or the schedule. Do not stop taking them without talking to your doctor. Do not share them. Know what results and side effects to look for. Report them to your doctor. Some drugs can be dangerous when mixed. Talk to a doctor or pharmacist if you are taking more than one drug. This includes over-the-counter medicines and herb or dietary supplements. Plan ahead for refills so you do not run out. Lifestyle Changes    You and your doctor will plan lifestyle changes that will help you recover. Some things to keep in mind include: Atherosclerosis and high blood pressure should be carefully managed. This can be done with medicines and a healthy lifestyle. If you smoke, talk to your doctor about quitting.      Follow-up   Call Your Doctor If Any of the Following Occurs   After you leave the hospital, call your doctor if any of the following occurs:   Redness, swelling, increasing pain, excessive bleeding, or discharge at the incision site   Signs of infection, including fever and chills   Any change of color or sensation in your legs or feet   Burning, pain, or other problems when urinating   Nausea or vomiting   Abdominal cramps or diarrhea   Unusual fatigue or depression   Disorientation or confusion   Numbness or tingling in the legs   New, unexplained symptoms   Cough   Call 911 or go to the emergency room right away if you have:   Shortness of breath   Chest pain   If you think you have an emergency,  CALL 911. Medications  -Take prescription medications only as directed  -If pain is not severe, you may take the non-prescription medication that you normally take.   -If any side effects or adverse reactions occur, discontinue the medication and contact your doctor.  -Review the patient drug information leaflet, when provided, before you begin taking the medication    Ambulation and Activity  -You are advised to go directly home from the hospital  -Use the prescribed walking aids  -Surgical shoe or boot   Leg/Foot.  -Do no lift or move heavy objects  -Do not Drive    Post Anesthesia Instructions  -Do not drive or operate machinery  -Do not consume alcohol, tranquilizers, sleeping medications, or a non-prescription pain medication  -Do not make important decisions  -You should have someone home with you tonight    Care of the Operative Site  -Keep cast or bandage clean, dry, and intact  -Do not shower  -Do not remove bandage  -Elevate Operative extremity as much as possible to reduce swelling and discomfort for the first 72 hours  -Apply ice bag wrapped in thick towel over bandage 20 minutes of every hour for the first 72 hours while awake  -Do not attempt to put anything between the cast or dressing and skin, some itching is normal    Special Instructions: Call doctor immediately if you develop any of the following:  -Fever over 100 degrees by mouth - take your temperature daily  -Pain not relieved by medication ordered  -Swelling, increased redness, warmth, or hardness around operative area  -Numb, tingling or cold toes  -Toe(s) become white or bluish  -Bandage becomes wet, soiled, or blood soaked (a small amount of bleeding may be normal)  -Increasing or progressive drainage from surgical area    Follow up - Call   [facility] office for f/u appointment.  Cell phone  [phone]

## 2023-03-10 NOTE — ANESTHESIA POSTPROCEDURE EVALUATION
Department of Anesthesiology  Postprocedure Note    Patient: Fercho Garces  MRN: [id number]  YOB: 1955  Date of evaluation: 3/10/2023      Procedure Summary     Date: 03/10/23 Room / Location: [address]    Anesthesia Start: 0280 Anesthesia Stop: 5014    Procedure: ARTHRODESIS THIRD AND FOURTH DIGITS LEFT FOOT (Left: Foot) Diagnosis:       Acquired hammer toe of left foot      (Acquired hammer toe of left foot [M20.42])    Surgeons: Garfield Seals DPM Responsible Provider: Stuart Forman MD    Anesthesia Type: MAC ASA Status: 3          Anesthesia Type: No value filed.     David Phase I: David Score: 10    David Phase II:        Anesthesia Post Evaluation    Patient location during evaluation: bedside  Patient participation: complete - patient participated  Level of consciousness: awake and alert  Airway patency: patent  Nausea & Vomiting: no nausea and no vomiting  Complications: no  Cardiovascular status: blood pressure returned to baseline  Respiratory status: acceptable  Hydration status: euvolemic

## 2023-03-10 NOTE — BRIEF OP NOTE
Brief Postoperative Note      Patient: Sharen Osler  YOB: 1955  MRN: [id number]    Date of Procedure: 3/10/2023    Pre-Op Diagnosis: Acquired hammer toe of left foot [M20.42]    Post-Op Diagnosis: Same       Procedure(s):  ARTHRODESIS THIRD AND FOURTH DIGITS LEFT FOOT    Surgeon(s):  Leelee Ortiz DPM    Assistant:  Resident: Js Peters DPM    Anesthesia: Monitor Anesthesia Care    Estimated Blood Loss (mL): Minimal    Complications: None    Specimens:   * No specimens in log *    Implants:  Implant Name Type Inv.  Item Serial No.  Lot No. LRB No. Used Action   ARTHREX, DYNANITE FLEXWIRE 1.1MM DOUBLE TIPPED NITINOL G-WIRE, TWO ZONE     [id number] Left 2 Implanted   K WIRE FIX L6IN DIA0.045IN [id number]] [facility] [employer]] - [id number]  K WIRE FIX L6IN DIA0.045IN [id number]] [employer]]  [facility] [employer]-WD [id number] Left 1 Implanted         Drains: * No LDAs found *    Findings: None    Electronically signed by Oumou Way DPM on 3/10/2023 at 7:54 AM

## 2023-03-10 NOTE — OP NOTE
Operative Note      Patient: Isaac Wilson  YOB: 1955  MRN: [id number]    Date of Procedure: 3/10/2023    Pre-Op Diagnosis: Acquired hammer toe of left foot [M20.42]    Post-Op Diagnosis: Same       Procedure(s):  ARTHRODESIS THIRD AND FOURTH DIGITS LEFT FOOT    Surgeon(s):  Rosa Gallagher DPM    Assistant:   Resident: Bonita Patel DPM    Anesthesia: Monitor Anesthesia Care    Estimated Blood Loss (mL): Minimal    Complications: None    Specimens:   * No specimens in log *    Implants:  Implant Name Type Inv. Item Serial No.  Lot No. LRB No. Used Action   ARTHREX, DYNANITE FLEXWIRE 1.1MM DOUBLE TIPPED NITINOL G-WIRE, TWO ZONE     [id number] Left 2 Implanted   K WIRE FIX L6IN DIA0.045IN [id number]] [facility] [employer]] - [id number]  K WIRE FIX L6IN DIA0.045IN [id number]] [employer]]  [facility] [employer]WD [id number] Left 1 Implanted         Drains: * No LDAs found *    Findings: None    Detailed Description of Procedure: Following pre operative evaluation the patient was brought into the OR and placed on the OR table in the supine position. MAC sedation was administered by anesthesia. Following sedation, a well padded tourniquet was placed on the left ankle. Pre-op local  anesthetic of 10cc's of 0.5% marcaine plain was injected to the base of the 3rd and 4th digits. The foot was then prepped and draped in a sterile manner.  The foot was then lowered into the surgical field and the tourniquet was inflated to 250mmHg    Electronically signed by Ambrose Duane, DPM on 3/10/2023 at 7:58 AM

## 2023-03-10 NOTE — OP NOTE
Operative Note      Patient: Celena Jones  YOB: 1955  MRN: [id number]    Date of Procedure: 3/10/2023    Pre-Op Diagnosis: Acquired hammer toe of left foot [M20.42]    Post-Op Diagnosis: Same       Procedure(s):  ARTHRODESIS THIRD AND FOURTH DIGITS LEFT FOOT    Surgeon(s):  Scotty Caballero DPM    Assistant:   Resident: Katie Hernandez DPM    Anesthesia: Monitor Anesthesia Care    Estimated Blood Loss (mL): Minimal    Complications: None    Specimens:   * No specimens in log *    Implants:  Implant Name Type Inv. Item Serial No.  Lot No. LRB No. Used Action   ARTHREX, DYNANITE FLEXWIRE 1.1MM DOUBLE TIPPED NITINOL G-WIRE, TWO ZONE     [id number] Left 2 Implanted         Drains: * No LDAs found *    Findings: See op note    Detailed Description of Procedure: Following satisfactory pre-op evaluation the patient was brought into the OR and placed on the OR table in the supine position. MAC sedation was administered by anesthesia. Following sedation, a well padded pneumatic tourniquet was placed on the left ankle. Local anesthetic consisting of 10cc of 0.5% Marcaine plain was then injected into the left base of the 3rd and 4th digits. The foot was then prepped and draped in the usual sterile manner. The foot was exsanguinated by elevation and the tourniquet inflated to 250 mmHg. Attention was then directed to the left 3rd and 4th digits were longitudinal incisions were made through the skin by 15 blade from proximal to distal overlying the MTPJs and PIPJs. The subcutaneous tissue of each digit was then reflected off the proximal phalanx by sharp and blunt dissection by 15 blade. The extensor tendon was transected at the PIPJ of the digits and reflected off the proximal phalanx. A transverse capsulotomy of the MTPJ was then performed by 15 blade, reducing the extension contracture on the digit.   The medial and lateral capsule and collateral ligaments were then freed from the head of the proximal phalanx. Using a oscillating saw several mm of the head of the proximal phalanx and base of the middle phalanx were resected off the 3rd and 4th digits. A dynalite k wire was then inserted through the tip of the 3rd and 4th digits, through the phalanges and into the metatarsal to maintain rectus position of the digits. The position of the k wires was then checked under c arm fluoroscopy and were noted to in correct alignment. The wires were then bent and cut at the tip of the toe and wire balls attached. The extensor tendon was then reapproximated by 2-0 vicryl suture, the subcutaneous layer by 2-0 vicryl suture and the skin with 3-0 nylon sutures for both digits. Lastly a stab incision was made to the plantar MPJ of the 3rd and 4th digits and flexor tenotomy was performed to release the flexor tendons. Incision sites were closed with 3-0 nylon. Post op local anesthetic was then injected into the right foot consisting of 10ml of 0.5% Marcaine plain. The tourniquet was then deflated. A dressing was applied consisting Jumpstart, 4x4 gauze, kerlix, and ace wrap.  The patient tolerated anesthesia and the procedure well and was transported to recovery         Electronically signed by Tito Sanchez DPM on 3/10/2023 at 6:01 PM
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
## 2023-03-14 ENCOUNTER — OFFICE VISIT (OUTPATIENT)
Dept: PODIATRY | Age: 68
End: 2023-03-14

## 2023-03-14 VITALS — TEMPERATURE: 97.7 F | BODY MASS INDEX: 18.04 KG/M2 | WEIGHT: 133 LBS

## 2023-03-14 DIAGNOSIS — Z09 POSTOPERATIVE EXAMINATION: Primary | ICD-10-CM

## 2023-03-14 PROCEDURE — 99024 POSTOP FOLLOW-UP VISIT: CPT | Performed by: PODIATRIST

## 2023-03-14 NOTE — PROGRESS NOTES
Postop Progress Note    Subjective    Ken Whyte presents to the office 4 days  following foot sx . The patient is not having any pain. Objective    Vitals:    03/14/23 1354   Temp: 97.7 °F (36.5 °C)     General: alert, cooperative, and no distress  Incision: healing well    Assessment    Doing well postoperatively. Plan   1. Continue any current medications  2. Wound care discussed  3. Wound/Incision: healing well, no drainage, no erythema, no hernia, no seroma, no swelling, well approximated  4. Disposition:   Limited activities. No heavy lifting. No strenuous exercise. Should not return to gym class or sports until cleared by a physician. 5. Diet: regular diet  6. Follow up: 1 week.      Sterile dressing applied K wires in excellent position x-ray looks good with no complication      Electronically signed by Whitney Ariza DPM on 3/14/2023 at 2:21 PM

## 2023-03-23 PROBLEM — Z01.818 PRE-OP EXAM: Status: RESOLVED | Noted: 2023-02-21 | Resolved: 2023-03-23

## 2023-03-25 ENCOUNTER — OFFICE VISIT (OUTPATIENT)
Dept: PODIATRY | Age: 68
End: 2023-03-25

## 2023-03-25 VITALS — TEMPERATURE: 98 F | HEIGHT: 72 IN | WEIGHT: 133 LBS | BODY MASS INDEX: 18.01 KG/M2

## 2023-03-25 DIAGNOSIS — Z09 POSTOPERATIVE EXAMINATION: Primary | ICD-10-CM

## 2023-03-25 NOTE — PROGRESS NOTES
Postop Progress Note    Subjective    Manuel Bynum presents to the office 2 weeks  following foot sx . Medication(s) being used: acetaminophen. Objective    Vitals:    03/25/23 1125   Temp: 98 °F (36.7 °C)     General: alert, cooperative, and no distress  Incision: healing well    Assessment    Doing well postoperatively. Plan   1. Continue any current medications  2. Wound care discussed  3. Wound/Incision: sutures removed  4. Disposition:   Limited activities. No heavy lifting. No strenuous exercise. 5. Diet:   6. Follow up: 2 weeks. Patient Name: Manuel Bynum     Suture/ Staple Removal Procedure Note  Indication: Wound healed incision is healed there is no signs of erythematous wound dehiscence or drainage    Procedure: The patient was placed in the appropriate position the wound was aseptically prepped with chlora prep   and the sutures were removed without difficulty. The site was  dressed  the area has healed with no complications      The patient tolerated the procedure well.     Complications: None       Electronically signed by Maya Juan DPM on 3/25/2023 at 11:39 AM

## 2023-06-20 ENCOUNTER — PROCEDURE VISIT (OUTPATIENT)
Dept: PODIATRY | Age: 68
End: 2023-06-20
Payer: MEDICARE

## 2023-06-20 VITALS
TEMPERATURE: 97.7 F | SYSTOLIC BLOOD PRESSURE: 118 MMHG | DIASTOLIC BLOOD PRESSURE: 62 MMHG | WEIGHT: 133 LBS | BODY MASS INDEX: 18.04 KG/M2

## 2023-06-20 DIAGNOSIS — E11.42 DIABETIC POLYNEUROPATHY ASSOCIATED WITH TYPE 2 DIABETES MELLITUS (HCC): Primary | ICD-10-CM

## 2023-06-20 PROCEDURE — 99213 OFFICE O/P EST LOW 20 MIN: CPT | Performed by: PODIATRIST

## 2023-06-20 PROCEDURE — 1123F ACP DISCUSS/DSCN MKR DOCD: CPT | Performed by: PODIATRIST

## 2023-06-20 RX ORDER — GABAPENTIN 100 MG/1
100 CAPSULE ORAL 2 TIMES DAILY
Qty: 60 CAPSULE | Refills: 5 | Status: SHIPPED | OUTPATIENT
Start: 2023-06-20 | End: 2023-12-17
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
## 2023-07-27 ENCOUNTER — OFFICE VISIT (OUTPATIENT)
Dept: PRIMARY CARE CLINIC | Age: 68
End: 2023-07-27
Payer: MEDICARE

## 2023-07-27 VITALS
OXYGEN SATURATION: 92 % | TEMPERATURE: 98 F | HEIGHT: 72 IN | DIASTOLIC BLOOD PRESSURE: 74 MMHG | WEIGHT: 137 LBS | BODY MASS INDEX: 18.56 KG/M2 | SYSTOLIC BLOOD PRESSURE: 120 MMHG | HEART RATE: 79 BPM

## 2023-07-27 DIAGNOSIS — R20.2 NUMBNESS AND TINGLING OF BOTH FEET: ICD-10-CM

## 2023-07-27 DIAGNOSIS — R73.02 IMPAIRED GLUCOSE TOLERANCE: ICD-10-CM

## 2023-07-27 DIAGNOSIS — F17.200 TOBACCO DEPENDENCE: ICD-10-CM

## 2023-07-27 DIAGNOSIS — I73.9 PAD (PERIPHERAL ARTERY DISEASE) (HCC): Primary | ICD-10-CM

## 2023-07-27 DIAGNOSIS — E78.5 HYPERLIPIDEMIA, UNSPECIFIED HYPERLIPIDEMIA TYPE: ICD-10-CM

## 2023-07-27 DIAGNOSIS — R20.0 NUMBNESS AND TINGLING OF BOTH FEET: ICD-10-CM

## 2023-07-27 PROBLEM — F33.0 MAJOR DEPRESSIVE DISORDER, RECURRENT, MILD (HCC): Status: ACTIVE | Noted: 2023-07-27

## 2023-07-27 PROBLEM — F33.1 MAJOR DEPRESSIVE DISORDER, RECURRENT, MODERATE (HCC): Status: ACTIVE | Noted: 2023-07-27

## 2023-07-27 PROBLEM — F33.9 MAJOR DEPRESSIVE DISORDER, RECURRENT, UNSPECIFIED (HCC): Status: ACTIVE | Noted: 2023-07-27

## 2023-07-27 PROCEDURE — 99214 OFFICE O/P EST MOD 30 MIN: CPT | Performed by: FAMILY MEDICINE

## 2023-07-27 PROCEDURE — 1123F ACP DISCUSS/DSCN MKR DOCD: CPT | Performed by: FAMILY MEDICINE

## 2023-07-27 SDOH — ECONOMIC STABILITY: HOUSING INSECURITY
IN THE LAST 12 MONTHS, WAS THERE A TIME WHEN YOU DID NOT HAVE A STEADY PLACE TO SLEEP OR SLEPT IN A SHELTER (INCLUDING NOW)?: NO

## 2023-07-27 SDOH — ECONOMIC STABILITY: INCOME INSECURITY: HOW HARD IS IT FOR YOU TO PAY FOR THE VERY BASICS LIKE FOOD, HOUSING, MEDICAL CARE, AND HEATING?: NOT HARD AT ALL

## 2023-07-27 SDOH — ECONOMIC STABILITY: FOOD INSECURITY: WITHIN THE PAST 12 MONTHS, THE FOOD YOU BOUGHT JUST DIDN'T LAST AND YOU DIDN'T HAVE MONEY TO GET MORE.: NEVER TRUE

## 2023-07-27 SDOH — ECONOMIC STABILITY: FOOD INSECURITY: WITHIN THE PAST 12 MONTHS, YOU WORRIED THAT YOUR FOOD WOULD RUN OUT BEFORE YOU GOT MONEY TO BUY MORE.: NEVER TRUE

## 2023-07-27 ASSESSMENT — ENCOUNTER SYMPTOMS
ALLERGIC/IMMUNOLOGIC NEGATIVE: 1
EYES NEGATIVE: 1
RESPIRATORY NEGATIVE: 1
GASTROINTESTINAL NEGATIVE: 1

## 2023-08-29 ENCOUNTER — PROCEDURE VISIT (OUTPATIENT)
Dept: PODIATRY | Age: 68
End: 2023-08-29
Payer: MEDICARE

## 2023-08-29 VITALS — BODY MASS INDEX: 18.58 KG/M2 | WEIGHT: 137 LBS | TEMPERATURE: 97.1 F

## 2023-08-29 DIAGNOSIS — E11.42 DIABETIC POLYNEUROPATHY ASSOCIATED WITH TYPE 2 DIABETES MELLITUS (HCC): Primary | ICD-10-CM

## 2023-08-29 DIAGNOSIS — M20.41 HAMMER TOES OF BOTH FEET: ICD-10-CM

## 2023-08-29 DIAGNOSIS — M20.42 HAMMER TOES OF BOTH FEET: ICD-10-CM

## 2023-08-29 PROCEDURE — 1123F ACP DISCUSS/DSCN MKR DOCD: CPT | Performed by: PODIATRIST

## 2023-08-29 PROCEDURE — 99213 OFFICE O/P EST LOW 20 MIN: CPT | Performed by: PODIATRIST

## 2023-08-29 RX ORDER — GABAPENTIN 300 MG/1
300 CAPSULE ORAL 2 TIMES DAILY
Qty: 180 CAPSULE | Refills: 1 | Status: SHIPPED | OUTPATIENT
Start: 2023-08-29 | End: 2024-02-25

## 2023-08-29 RX ORDER — L-METHYLFOLATE-ALGAE-VIT B12-B6 CAP 3-90.314-2-35 MG 3-90.314-2-35 MG
1 CAP ORAL DAILY
Qty: 60 TABLET | Refills: 0 | Status: SHIPPED | OUTPATIENT
Start: 2023-08-29 | End: 2023-10-28
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
## 2023-10-31 ENCOUNTER — PROCEDURE VISIT (OUTPATIENT)
Dept: PODIATRY | Age: 68
End: 2023-10-31
Payer: MEDICARE

## 2023-10-31 VITALS — WEIGHT: 146 LBS | BODY MASS INDEX: 19.8 KG/M2 | TEMPERATURE: 97.5 F

## 2023-10-31 DIAGNOSIS — M20.41 HAMMER TOES OF BOTH FEET: ICD-10-CM

## 2023-10-31 DIAGNOSIS — I73.9 PERIPHERAL VASCULAR DISEASE, UNSPECIFIED (HCC): ICD-10-CM

## 2023-10-31 DIAGNOSIS — L40.9 PSORIASIS (A TYPE OF SKIN INFLAMMATION): ICD-10-CM

## 2023-10-31 DIAGNOSIS — M79.675 PAIN IN LEFT TOE(S): ICD-10-CM

## 2023-10-31 DIAGNOSIS — E11.42 DIABETIC POLYNEUROPATHY ASSOCIATED WITH TYPE 2 DIABETES MELLITUS (HCC): Primary | ICD-10-CM

## 2023-10-31 DIAGNOSIS — L81.9 DISCOLORATION OF SKIN OF LOWER LEG: ICD-10-CM

## 2023-10-31 DIAGNOSIS — M20.42 HAMMER TOES OF BOTH FEET: ICD-10-CM

## 2023-10-31 DIAGNOSIS — S98.112A AMPUTATION OF LEFT GREAT TOE (HCC): ICD-10-CM

## 2023-10-31 DIAGNOSIS — M79.672 PAIN IN LEFT FOOT: ICD-10-CM

## 2023-10-31 PROCEDURE — 1123F ACP DISCUSS/DSCN MKR DOCD: CPT | Performed by: PODIATRIST

## 2023-10-31 PROCEDURE — 99213 OFFICE O/P EST LOW 20 MIN: CPT | Performed by: PODIATRIST

## 2023-10-31 RX ORDER — GABAPENTIN 300 MG/1
300 CAPSULE ORAL 3 TIMES DAILY
Qty: 540 CAPSULE | Refills: 0 | Status: SHIPPED | OUTPATIENT
Start: 2023-10-31 | End: 2024-04-28

## 2023-10-31 RX ORDER — DULOXETIN HYDROCHLORIDE 30 MG/1
30 CAPSULE, DELAYED RELEASE ORAL 2 TIMES DAILY
Qty: 120 CAPSULE | Refills: 0 | Status: SHIPPED | OUTPATIENT
Start: 2023-10-31 | End: 2023-12-30
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
## 2024-01-02 ENCOUNTER — PROCEDURE VISIT (OUTPATIENT)
Dept: PODIATRY | Age: 69
End: 2024-01-02
Payer: MEDICAID

## 2024-01-02 VITALS
BODY MASS INDEX: 20.34 KG/M2 | WEIGHT: 150 LBS | SYSTOLIC BLOOD PRESSURE: 140 MMHG | TEMPERATURE: 98.2 F | DIASTOLIC BLOOD PRESSURE: 60 MMHG

## 2024-01-02 DIAGNOSIS — E11.42 DIABETIC POLYNEUROPATHY ASSOCIATED WITH TYPE 2 DIABETES MELLITUS (HCC): Primary | ICD-10-CM

## 2024-01-02 PROCEDURE — 1123F ACP DISCUSS/DSCN MKR DOCD: CPT | Performed by: PODIATRIST

## 2024-01-02 PROCEDURE — 99213 OFFICE O/P EST LOW 20 MIN: CPT | Performed by: PODIATRIST

## 2024-01-02 RX ORDER — DULOXETIN HYDROCHLORIDE 30 MG/1
30 CAPSULE, DELAYED RELEASE ORAL 2 TIMES DAILY
Qty: 120 CAPSULE | Refills: 0 | Status: SHIPPED | OUTPATIENT
Start: 2024-01-02 | End: 2024-03-02

## 2024-01-02 RX ORDER — PREGABALIN 100 MG/1
100 CAPSULE ORAL 2 TIMES DAILY
Qty: 180 CAPSULE | Refills: 0 | Status: SHIPPED | OUTPATIENT
Start: 2024-01-02 | End: 2024-04-01
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
## 2024-02-06 ENCOUNTER — OFFICE VISIT (OUTPATIENT)
Dept: PRIMARY CARE CLINIC | Age: 69
End: 2024-02-06
Payer: MEDICARE

## 2024-02-06 VITALS
OXYGEN SATURATION: 95 % | DIASTOLIC BLOOD PRESSURE: 80 MMHG | TEMPERATURE: 98 F | WEIGHT: 151 LBS | SYSTOLIC BLOOD PRESSURE: 152 MMHG | HEART RATE: 82 BPM | BODY MASS INDEX: 20.45 KG/M2 | HEIGHT: 72 IN

## 2024-02-06 DIAGNOSIS — F17.200 TOBACCO DEPENDENCE: ICD-10-CM

## 2024-02-06 DIAGNOSIS — E78.5 HYPERLIPIDEMIA, UNSPECIFIED HYPERLIPIDEMIA TYPE: ICD-10-CM

## 2024-02-06 DIAGNOSIS — M79.672 FOOT PAIN, BILATERAL: Primary | ICD-10-CM

## 2024-02-06 DIAGNOSIS — M79.671 FOOT PAIN, BILATERAL: Primary | ICD-10-CM

## 2024-02-06 DIAGNOSIS — R73.02 IMPAIRED GLUCOSE TOLERANCE: ICD-10-CM

## 2024-02-06 DIAGNOSIS — I73.9 PAD (PERIPHERAL ARTERY DISEASE) (HCC): ICD-10-CM

## 2024-02-06 DIAGNOSIS — M54.50 LUMBAR PAIN: ICD-10-CM

## 2024-02-06 PROCEDURE — 99214 OFFICE O/P EST MOD 30 MIN: CPT | Performed by: FAMILY MEDICINE

## 2024-02-06 PROCEDURE — 1123F ACP DISCUSS/DSCN MKR DOCD: CPT | Performed by: FAMILY MEDICINE

## 2024-02-06 RX ORDER — MELOXICAM 15 MG/1
15 TABLET ORAL DAILY PRN
Qty: 30 TABLET | Refills: 0 | Status: SHIPPED | OUTPATIENT
Start: 2024-02-06

## 2024-02-06 ASSESSMENT — ENCOUNTER SYMPTOMS
GASTROINTESTINAL NEGATIVE: 1
ALLERGIC/IMMUNOLOGIC NEGATIVE: 1
EYES NEGATIVE: 1
RESPIRATORY NEGATIVE: 1
BACK PAIN: 1
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
## 2024-02-16 NOTE — PROGRESS NOTES
OCCUPATIONAL THERAPY INITIAL EVALUATION     [address] [facility], [address]                                                  Patient Name: Iman Hernandez  MRN: [id number]  : 1955  Room: [address] [place] [address]    Evaluating OT: Zahraa White, [address], OTR/L [id number]  Referring FREDDY Houser CNP  Specific Provider Orders: OT eval and treat   Recommended Adaptive Equipment: shower chair     Diagnosis: L foot infection   Surgery:   AMPUTATION 2ND DIGIT LEFT FOOT  Pertinent Medical History: none on file   Precautions:  Fall Risk, WBAT RLE (with post op shoe per perfect serve Dr.Baer GUADALUPE)    Assessment of current deficits   [x] Functional mobility  [x]ADLs  [x] Strength               [x]Cognition   [x] Functional transfers   [x] IADLs         [x] Safety Awareness   [x]Endurance   [] Fine Coordination              [x] Balance      [] Vision/perception   [x]Sensation    []Gross Motor Coordination  [] ROM  [] Delirium                   [] Motor Control     OT PLAN OF CARE   OT POC based on physician orders, patient diagnosis and results of clinical assessment    Home Living: Pt lives alone in an apartment; bed/bath on 1 level when entering   Bathroom setup: tub shower unit   Equipment owned: ww  Prior Level of Function: IND with ADLs/IADLs; using non AD for functional mobility     Pain Level: surgical site  Cognition: A&O: 4/4; Follows multi step directions    Memory:  good    Sequencing:  good    Problem solving:  good    Judgement/safety:  good     Functional Assessment:  AM-PAC Daily Activity Raw Score: 23/24   Initial Eval Status  Date: 22   Feeding IND (pt able to open packages and self feed)    Grooming Mod I (standing at sink)    UB Dressing IND    LB Dressing Mod I (increased time to sofia socks and R post op shoe)   Bathing SUP (simulated)   Toileting IND (pt able to complete hygiene and pants management) Bed Mobility  Log roll: IND  Supine to sit: IND   Sit to supine: IND    Functional Transfers Sit to stand:IND   Stand to sit:IND  Commode: IND   Functional Mobility Mod I (using no AD, to/from bathroom)   Balance Sitting: IND  Standing: Mod I   Activity Tolerance good   Visual/  Perceptual Glasses: yes           UE ROM: RUE:  WFL  LUE:  WFL  Strength: RUE: grossly 4/5 LUE: grossly 4/5   Strength: B WFL  Fine Motor Coordination:  WFL     Hearing: WFL  Sensation:  No c/o numbness/tingling  Tone:  WFL  Edema: R foot                            Comments:Cleared by RN to see pt. Upon arrival, patient supine in bed and agreeable to OT session. Pt completes ADLs/functional mobility/transfers Mod I, demo'ing good balance/safety awareness. At end of session, patient supine in bed with call light and phone within reach, all lines and tubes intact. Pt demonstrates no OT needs at this time. Eval Complexity: Low    Patient  instructed on diagnosis, prognosis/goals and plan of care. Demonstrated good understanding. [] Malnutrition indicators have been identified and nursing has been notified to ensure a dietitian consult is ordered. Evaluation time includes thorough review of current medical information, gathering information on past medical & social history & PLOF, completion of standardized testing, informal observation of tasks, consultation with other medical professions/disciplines, assessment of data & development of POC/goals.      Time In: 1035       Time Out: 1050     Total treatment time: 0       Treatment Charges: Mins Units   OT Eval Low 32492 X    OT Eval Medium 78934     OT Eval High 20423     OT Re-Eval [id number]     Ther Ex  77184       Manual Therapy 28384       Thera Activities 47497       ADL/Home Mgt 55530     Neuro Re-ed 86410       Group Therapy        Orthotic manage/training  47033       Non-Billable Time           Brynn Gallardo OTR/L [id number] PAST MEDICAL HISTORY:  2019 novel coronavirus disease (COVID-19)     ALS (amyotrophic lateral sclerosis)     Arthritis     Carpal tunnel syndrome, bilateral upper limbs     Cervical stenosis of spine     Chronic pain syndrome     Degenerative joint disease     Fibromyalgia     GERD (gastroesophageal reflux disease)     History of depression     History of heroin use     History of kidney stones multiple episodes, last in 2015    Lumbar radiculopathy     Medical marijuana use     Osteoporosis     Radiculopathy, cervical region     Spinal stenosis in cervical region s/p fusion 2017    Spondylosis without myelopathy or radiculopathy, cervical region

## 2024-03-05 ENCOUNTER — PROCEDURE VISIT (OUTPATIENT)
Dept: PODIATRY | Age: 69
End: 2024-03-05
Payer: MEDICARE

## 2024-03-05 VITALS
SYSTOLIC BLOOD PRESSURE: 124 MMHG | DIASTOLIC BLOOD PRESSURE: 78 MMHG | BODY MASS INDEX: 20.48 KG/M2 | TEMPERATURE: 97.8 F | WEIGHT: 151 LBS

## 2024-03-05 DIAGNOSIS — B35.1 TINEA UNGUIUM: ICD-10-CM

## 2024-03-05 DIAGNOSIS — L40.9 PSORIASIS (A TYPE OF SKIN INFLAMMATION): ICD-10-CM

## 2024-03-05 DIAGNOSIS — R26.2 DIFFICULTY WALKING: ICD-10-CM

## 2024-03-05 DIAGNOSIS — M20.41 HAMMER TOES OF BOTH FEET: ICD-10-CM

## 2024-03-05 DIAGNOSIS — I73.9 PERIPHERAL VASCULAR DISEASE, UNSPECIFIED (HCC): ICD-10-CM

## 2024-03-05 DIAGNOSIS — E11.42 DIABETIC POLYNEUROPATHY ASSOCIATED WITH TYPE 2 DIABETES MELLITUS (HCC): Primary | ICD-10-CM

## 2024-03-05 DIAGNOSIS — M79.672 PAIN IN LEFT FOOT: ICD-10-CM

## 2024-03-05 DIAGNOSIS — M79.675 PAIN IN LEFT TOE(S): ICD-10-CM

## 2024-03-05 DIAGNOSIS — L81.9 DISCOLORATION OF SKIN OF LOWER LEG: ICD-10-CM

## 2024-03-05 DIAGNOSIS — M20.42 HAMMER TOES OF BOTH FEET: ICD-10-CM

## 2024-03-05 PROCEDURE — 99213 OFFICE O/P EST LOW 20 MIN: CPT | Performed by: PODIATRIST

## 2024-03-05 PROCEDURE — 1123F ACP DISCUSS/DSCN MKR DOCD: CPT | Performed by: PODIATRIST

## 2024-03-05 RX ORDER — GABAPENTIN 100 MG/1
100 CAPSULE ORAL 3 TIMES DAILY
Qty: 540 CAPSULE | Refills: 0 | Status: SHIPPED | OUTPATIENT
Start: 2024-03-05 | End: 2024-09-01
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
## 2024-03-25 ENCOUNTER — HOSPITAL ENCOUNTER (EMERGENCY)
Age: 69
Discharge: HOME | End: 2024-03-25
Attending: EMERGENCY MEDICINE
Payer: MEDICARE

## 2024-03-25 ENCOUNTER — APPOINTMENT (OUTPATIENT)
Dept: GENERAL RADIOLOGY | Age: 69
End: 2024-03-25
Payer: MEDICARE

## 2024-03-25 VITALS
HEART RATE: 80 BPM | RESPIRATION RATE: 18 BRPM | DIASTOLIC BLOOD PRESSURE: 70 MMHG | TEMPERATURE: 97.5 F | OXYGEN SATURATION: 97 % | BODY MASS INDEX: 18.99 KG/M2 | SYSTOLIC BLOOD PRESSURE: 145 MMHG | WEIGHT: 140 LBS
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
## 2024-03-25 DIAGNOSIS — S20.212A CONTUSION OF LEFT CHEST WALL, INITIAL ENCOUNTER: ICD-10-CM

## 2024-03-25 DIAGNOSIS — S80.02XA CONTUSION OF LEFT KNEE, INITIAL ENCOUNTER: ICD-10-CM

## 2024-03-25 PROCEDURE — 6370000000 HC RX 637 (ALT 250 FOR IP): Performed by: EMERGENCY MEDICINE

## 2024-03-25 PROCEDURE — 99283 EMERGENCY DEPT VISIT LOW MDM: CPT

## 2024-03-25 PROCEDURE — 73030 X-RAY EXAM OF SHOULDER: CPT

## 2024-03-25 PROCEDURE — 73560 X-RAY EXAM OF KNEE 1 OR 2: CPT

## 2024-03-25 PROCEDURE — 71101 X-RAY EXAM UNILAT RIBS/CHEST: CPT

## 2024-03-25 RX ORDER — IBUPROFEN 800 MG/1
800 TABLET ORAL EVERY 6 HOURS PRN
Qty: 120 TABLET | Refills: 3 | Status: SHIPPED | OUTPATIENT
Start: 2024-03-25

## 2024-03-25 RX ORDER — OXYCODONE HYDROCHLORIDE AND ACETAMINOPHEN 5; 325 MG/1; MG/1
2 TABLET ORAL ONCE
Status: COMPLETED | OUTPATIENT
Start: 2024-03-25 | End: 2024-03-25

## 2024-03-25 RX ORDER — BACLOFEN 5 MG/1
10 TABLET ORAL 3 TIMES DAILY PRN
Qty: 30 TABLET | Refills: 0 | Status: SHIPPED | OUTPATIENT
Start: 2024-03-25

## 2024-03-25 RX ADMIN — OXYCODONE HYDROCHLORIDE AND ACETAMINOPHEN 2 TABLET: 5; 325 TABLET ORAL at 12:53

## 2024-03-25 ASSESSMENT — PAIN - FUNCTIONAL ASSESSMENT: PAIN_FUNCTIONAL_ASSESSMENT: 0-10

## 2024-03-25 ASSESSMENT — PAIN DESCRIPTION - FREQUENCY: FREQUENCY: CONTINUOUS

## 2024-03-25 ASSESSMENT — PAIN DESCRIPTION - LOCATION
LOCATION: SHOULDER;KNEE
LOCATION: ARM;KNEE

## 2024-03-25 ASSESSMENT — PAIN DESCRIPTION - ORIENTATION
ORIENTATION: LEFT
ORIENTATION: LEFT

## 2024-03-25 ASSESSMENT — PAIN SCALES - GENERAL
PAINLEVEL_OUTOF10: 9
PAINLEVEL_OUTOF10: 10

## 2024-03-25 ASSESSMENT — PAIN DESCRIPTION - ONSET: ONSET: ON-GOING

## 2024-03-25 ASSESSMENT — PAIN DESCRIPTION - PAIN TYPE: TYPE: ACUTE PAIN

## 2024-03-25 ASSESSMENT — LIFESTYLE VARIABLES
HOW OFTEN DO YOU HAVE A DRINK CONTAINING ALCOHOL: NEVER
HOW MANY STANDARD DRINKS CONTAINING ALCOHOL DO YOU HAVE ON A TYPICAL DAY: PATIENT DOES NOT DRINK

## 2024-03-25 ASSESSMENT — PAIN DESCRIPTION - DESCRIPTORS
DESCRIPTORS: ACHING;DISCOMFORT;SORE
DESCRIPTORS: ACHING;DISCOMFORT;THROBBING
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
## 2024-04-24 ENCOUNTER — OFFICE VISIT (OUTPATIENT)
Dept: PRIMARY CARE CLINIC | Age: 69
End: 2024-04-24
Payer: MEDICARE

## 2024-04-24 VITALS
SYSTOLIC BLOOD PRESSURE: 120 MMHG | DIASTOLIC BLOOD PRESSURE: 70 MMHG | WEIGHT: 141 LBS | OXYGEN SATURATION: 93 % | HEIGHT: 72 IN | HEART RATE: 88 BPM | BODY MASS INDEX: 19.1 KG/M2 | TEMPERATURE: 98 F

## 2024-04-24 DIAGNOSIS — M79.2 FOOT NEURALGIA: ICD-10-CM

## 2024-04-24 DIAGNOSIS — M79.671 FOOT PAIN, BILATERAL: Primary | ICD-10-CM

## 2024-04-24 DIAGNOSIS — M54.50 LUMBAR PAIN: ICD-10-CM

## 2024-04-24 DIAGNOSIS — M43.06 LUMBAR SPONDYLOLYSIS: ICD-10-CM

## 2024-04-24 DIAGNOSIS — M79.672 FOOT PAIN, BILATERAL: Primary | ICD-10-CM

## 2024-04-24 DIAGNOSIS — Z72.0 TOBACCO USE: ICD-10-CM

## 2024-04-24 DIAGNOSIS — F17.200 TOBACCO DEPENDENCE: ICD-10-CM

## 2024-04-24 DIAGNOSIS — F33.1 MAJOR DEPRESSIVE DISORDER, RECURRENT, MODERATE (HCC): ICD-10-CM

## 2024-04-24 DIAGNOSIS — J43.2 CENTRILOBULAR EMPHYSEMA (HCC): ICD-10-CM

## 2024-04-24 DIAGNOSIS — J96.11 CHRONIC RESPIRATORY FAILURE WITH HYPOXIA (HCC): ICD-10-CM

## 2024-04-24 PROBLEM — E44.0 MODERATE PROTEIN-CALORIE MALNUTRITION (HCC): Status: RESOLVED | Noted: 2022-08-19 | Resolved: 2024-04-24

## 2024-04-24 PROCEDURE — 1123F ACP DISCUSS/DSCN MKR DOCD: CPT | Performed by: FAMILY MEDICINE

## 2024-04-24 PROCEDURE — 99214 OFFICE O/P EST MOD 30 MIN: CPT | Performed by: FAMILY MEDICINE

## 2024-04-24 RX ORDER — OXYCODONE AND ACETAMINOPHEN 10; 325 MG/1; MG/1
TABLET ORAL
COMMUNITY
Start: 2024-04-08

## 2024-04-24 ASSESSMENT — PATIENT HEALTH QUESTIONNAIRE - PHQ9
9. THOUGHTS THAT YOU WOULD BE BETTER OFF DEAD, OR OF HURTING YOURSELF: NOT AT ALL
10. IF YOU CHECKED OFF ANY PROBLEMS, HOW DIFFICULT HAVE THESE PROBLEMS MADE IT FOR YOU TO DO YOUR WORK, TAKE CARE OF THINGS AT HOME, OR GET ALONG WITH OTHER PEOPLE: VERY DIFFICULT
2. FEELING DOWN, DEPRESSED OR HOPELESS: NEARLY EVERY DAY
5. POOR APPETITE OR OVEREATING: NOT AT ALL
SUM OF ALL RESPONSES TO PHQ QUESTIONS 1-9: 5
7. TROUBLE CONCENTRATING ON THINGS, SUCH AS READING THE NEWSPAPER OR WATCHING TELEVISION: NOT AT ALL
SUM OF ALL RESPONSES TO PHQ QUESTIONS 1-9: 5
3. TROUBLE FALLING OR STAYING ASLEEP: NOT AT ALL
1. LITTLE INTEREST OR PLEASURE IN DOING THINGS: MORE THAN HALF THE DAYS
4. FEELING TIRED OR HAVING LITTLE ENERGY: NOT AT ALL
SUM OF ALL RESPONSES TO PHQ9 QUESTIONS 1 & 2: 5
8. MOVING OR SPEAKING SO SLOWLY THAT OTHER PEOPLE COULD HAVE NOTICED. OR THE OPPOSITE, BEING SO FIGETY OR RESTLESS THAT YOU HAVE BEEN MOVING AROUND A LOT MORE THAN USUAL: NOT AT ALL
6. FEELING BAD ABOUT YOURSELF - OR THAT YOU ARE A FAILURE OR HAVE LET YOURSELF OR YOUR FAMILY DOWN: NOT AT ALL

## 2024-04-24 ASSESSMENT — ENCOUNTER SYMPTOMS
RESPIRATORY NEGATIVE: 1
EYES NEGATIVE: 1
ALLERGIC/IMMUNOLOGIC NEGATIVE: 1
GASTROINTESTINAL NEGATIVE: 1
BACK PAIN: 1

## 2024-04-24 NOTE — PROGRESS NOTES
24     Krista Johnson    : 1955 Sex: female   Age: 68 y.o.      Chief Complaint   Patient presents with    Foot Pain       Prior to Admission medications    Medication Sig Start Date End Date Taking? Authorizing Provider   oxyCODONE-acetaminophen (PERCOCET)  MG per tablet 1 tablet as needed Orally every 6 hrs for 30 days 24  Yes ProviderAylin MD   ibuprofen (IBU) 800 MG tablet Take 1 tablet by mouth every 6 hours as needed for Pain 3/25/24  Yes Zach Washington MD   Baclofen (LIORESAL) 5 MG tablet Take 2 tablets by mouth 3 times daily as needed (muscle spasms) 3/25/24  Yes Zach Washington MD   gabapentin (NEURONTIN) 100 MG capsule Take 1 capsule by mouth 3 times daily for 180 days. Intended supply: 30 days 3/5/24 9/1/24 Yes Chepe Shah DPM   atorvastatin (LIPITOR) 40 MG tablet Take 1 tablet by mouth nightly 23  Yes Bon Morris, DO          HPI: Patient seen today bilateral foot pain that has been persistent.  She was evaluated by Dr. Chepe Shah and placed on gabapentin but has not been taking the medication as prescribed.  I encouraged her to resume as prescribed but we will also follow-up with EMG nerve conduction study.  Hyperlipidemia controlled meds as prescribed.  Longstanding smoker we discussed and she should be evaluated with the CT scan and we will arrange.  Screening study.  40+ pack year history.          Review of Systems   Constitutional: Negative.    HENT: Negative.     Eyes: Negative.    Respiratory: Negative.     Gastrointestinal: Negative.    Endocrine: Negative.    Genitourinary: Negative.    Musculoskeletal:  Positive for arthralgias, back pain and gait problem.   Skin: Negative.    Allergic/Immunologic: Negative.    Hematological: Negative.    Psychiatric/Behavioral: Negative.                Current Outpatient Medications:     oxyCODONE-acetaminophen (PERCOCET)  MG per tablet, 1 tablet as needed Orally every 6 hrs for 30 days, Disp: , Rfl:

## 2024-05-14 ENCOUNTER — PROCEDURE VISIT (OUTPATIENT)
Dept: PODIATRY | Age: 69
End: 2024-05-14
Payer: MEDICARE

## 2024-05-14 VITALS — TEMPERATURE: 97.4 F | WEIGHT: 141 LBS | BODY MASS INDEX: 19.12 KG/M2

## 2024-05-14 DIAGNOSIS — E11.42 DIABETIC POLYNEUROPATHY ASSOCIATED WITH TYPE 2 DIABETES MELLITUS (HCC): Primary | ICD-10-CM

## 2024-05-14 DIAGNOSIS — M79.675 PAIN IN LEFT TOE(S): ICD-10-CM

## 2024-05-14 DIAGNOSIS — M79.672 PAIN IN LEFT FOOT: ICD-10-CM

## 2024-05-14 PROCEDURE — 1123F ACP DISCUSS/DSCN MKR DOCD: CPT | Performed by: PODIATRIST

## 2024-05-14 PROCEDURE — 99213 OFFICE O/P EST LOW 20 MIN: CPT | Performed by: PODIATRIST

## 2024-05-14 RX ORDER — GABAPENTIN 100 MG/1
100 CAPSULE ORAL 3 TIMES DAILY
Qty: 540 CAPSULE | Refills: 0 | Status: SHIPPED | OUTPATIENT
Start: 2024-05-14 | End: 2024-11-10
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
## 2024-05-22 ENCOUNTER — OFFICE VISIT (OUTPATIENT)
Age: 69
End: 2024-05-22
Payer: MEDICARE

## 2024-05-22 VITALS
OXYGEN SATURATION: 96 % | HEIGHT: 72 IN | WEIGHT: 140 LBS | HEART RATE: 74 BPM | DIASTOLIC BLOOD PRESSURE: 69 MMHG | BODY MASS INDEX: 18.96 KG/M2 | RESPIRATION RATE: 16 BRPM | SYSTOLIC BLOOD PRESSURE: 125 MMHG | TEMPERATURE: 97.2 F

## 2024-05-22 DIAGNOSIS — F11.90 CHRONIC, CONTINUOUS USE OF OPIOIDS: ICD-10-CM

## 2024-05-22 DIAGNOSIS — R20.2 NUMBNESS AND TINGLING OF BOTH FEET: ICD-10-CM

## 2024-05-22 DIAGNOSIS — M79.672 FOOT PAIN, BILATERAL: ICD-10-CM

## 2024-05-22 DIAGNOSIS — G60.9 IDIOPATHIC PERIPHERAL NEUROPATHY: Primary | ICD-10-CM

## 2024-05-22 DIAGNOSIS — M79.671 FOOT PAIN, BILATERAL: ICD-10-CM

## 2024-05-22 DIAGNOSIS — R20.0 NUMBNESS AND TINGLING OF BOTH FEET: ICD-10-CM

## 2024-05-22 PROCEDURE — 1123F ACP DISCUSS/DSCN MKR DOCD: CPT | Performed by: STUDENT IN AN ORGANIZED HEALTH CARE EDUCATION/TRAINING PROGRAM

## 2024-05-22 PROCEDURE — 99204 OFFICE O/P NEW MOD 45 MIN: CPT | Performed by: STUDENT IN AN ORGANIZED HEALTH CARE EDUCATION/TRAINING PROGRAM

## 2024-05-22 NOTE — PROGRESS NOTES
Patient:  Krista Johnson,  1955  Date of Service:  24      Patient presents to [facility] with complaints of bilateral feet  pain that started 4 years ago and has been getting worse.     She states the pain began following No specific cause    Pain is intermittent and is described as aching, throbbing, and shooting. She rates the pain as a 10/10 on her worst day , 8/10 on her best day, and a 9/10 on average on the VAS scale.     Pain does not radiate . She  has numbness, tingling, weakness of the bilateral feet.    Alleviating factors include: nothing.  Aggravating factors include:  walking, standing. She states that the pain does keep her from sleeping at night. She took her last dose of Percocet .     She is not on NSAIDS and  is not on anticoagulation medications   Previous treatments: Physical Therapy.      Personal Expectations from this treatment: increase activity and decrease pain    /69   Pulse 74   Temp 97.2 °F (36.2 °C) (Temporal)   Resp 16   Ht 1.829 m (6')   Wt 63.5 kg (140 lb)   SpO2 96%   BMI 18.99 kg/m²     No LMP recorded. Patient is postmenopausal.  
Sclera: icterus absent      Lungs: Breathing:normal breath pattern, unlabored    CVS: RRR, pulses intact b/l    Abdomen: non-distended and normal Non tender, no guarding.     Cervical spine: Inspection: normal   Palpation: No tenderness over the midline and paraspinal area, bilateral   Range of motion: Flexion Normal,  extension Normal,  bilateral  rotation is normal and is not painful.   Spurling's: negative bilaterally   Vick's: negative bilaterally     Thoracic spine: Inspection: normal   Palpation:No tenderness over the midline and paraspinal area, bilaterally  Range of motion: normal in flexion, extension rotation bilateral and is not painful.      Lumbar spine: Inspection: normal   Spine Range of motion: Flexion Normal,  extension Normal,  bilateral  Lateral bending, and rotation is normal and is not painful.   Palpation:midline tenderness.  Facet Loading: left-negative and right-negative.    Musculoskeletal:  SLR : (-) neg b/l   Trochanteric bursa tenderness: (-) neg bilaterally    Extremities:  Tremors: No  - bilateral upper and lower   Feet:   Inspection  Amputation of Left #1,2 - well healed  ROM: grossly normal bilaterally   Palpation: decreased sensation over both feet    Hips: no pain with internal rotation of bilateral  hips   Pulses: present Lt radial   Cyanosis: none   Edema: No      Neurological: Sensory:normal to light touch except for decreased sensation over b/l feet , CN 2-12 grossly intact     MOTOR Left (x/5) Right (x/5)   Shoulder Abduction (C5)  5 5   Biceps - Elbow Flexion (C6)  5 5   Triceps - Elbow Extension (C7)  5 5   Hand  (C8)  5 5   Iliopsoas - Hip flex /Abd (L2)  5 5   Quadriceps - Knee Ext (L3)  5 5   Ant Tibialis - Ankle Dorsiflex (L4)  5 5   Post Tibialis - Hip Ext/Abd Foot dorsiflex (L5)  5 5   Gastrocnemius - Plantar flex (S1)  5 5     REFLEXES Left Right   Quadriceps / Patellar (L4)  2+ 2+   Achilles (S1)  2+ 2+     Gait:antalgic  Assistance Devices: no    Dermatology:

## 2024-10-10 ENCOUNTER — PROCEDURE VISIT (OUTPATIENT)
Dept: PODIATRY | Age: 69
End: 2024-10-10

## 2024-10-10 VITALS
DIASTOLIC BLOOD PRESSURE: 64 MMHG | BODY MASS INDEX: 18.99 KG/M2 | WEIGHT: 140 LBS | SYSTOLIC BLOOD PRESSURE: 120 MMHG | TEMPERATURE: 98.8 F

## 2024-10-10 DIAGNOSIS — M79.675 PAIN IN LEFT TOE(S): ICD-10-CM

## 2024-10-10 DIAGNOSIS — I73.9 PERIPHERAL VASCULAR DISEASE, UNSPECIFIED (HCC): ICD-10-CM

## 2024-10-10 DIAGNOSIS — M79.672 PAIN IN LEFT FOOT: ICD-10-CM

## 2024-10-10 DIAGNOSIS — M20.41 HAMMER TOES OF BOTH FEET: ICD-10-CM

## 2024-10-10 DIAGNOSIS — M20.42 HAMMER TOES OF BOTH FEET: ICD-10-CM

## 2024-10-10 DIAGNOSIS — E11.42 DIABETIC POLYNEUROPATHY ASSOCIATED WITH TYPE 2 DIABETES MELLITUS (HCC): Primary | ICD-10-CM

## 2024-10-10 RX ORDER — GABAPENTIN 100 MG/1
100 CAPSULE ORAL 3 TIMES DAILY
Qty: 540 CAPSULE | Refills: 0 | Status: SHIPPED
Start: 2024-10-10 | End: 2024-10-10

## 2024-10-10 RX ORDER — GABAPENTIN 300 MG/1
300 CAPSULE ORAL 2 TIMES DAILY
Qty: 180 CAPSULE | Refills: 0 | Status: SHIPPED | OUTPATIENT
Start: 2024-10-10 | End: 2025-01-08
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
## 2025-01-02 ENCOUNTER — HOSPITAL ENCOUNTER (INPATIENT)
Age: 70
LOS: 5 days | Discharge: HOME HEALTH | DRG: 522 | End: 2025-01-07
Attending: HOSPITALIST | Admitting: INTERNAL MEDICINE
Payer: COMMERCIAL

## 2025-01-02 ENCOUNTER — HOSPITAL ENCOUNTER (EMERGENCY)
Age: 70
Discharge: ANOTHER HOSPITAL | End: 2025-01-02
Attending: STUDENT IN AN ORGANIZED HEALTH CARE EDUCATION/TRAINING PROGRAM
Payer: COMMERCIAL

## 2025-01-02 ENCOUNTER — APPOINTMENT (OUTPATIENT)
Dept: GENERAL RADIOLOGY | Age: 70
End: 2025-01-02
Payer: COMMERCIAL

## 2025-01-02 VITALS
DIASTOLIC BLOOD PRESSURE: 78 MMHG | SYSTOLIC BLOOD PRESSURE: 146 MMHG | BODY MASS INDEX: 16.95 KG/M2 | TEMPERATURE: 98.7 F | HEART RATE: 72 BPM | OXYGEN SATURATION: 94 % | WEIGHT: 125 LBS | RESPIRATION RATE: 16 BRPM

## 2025-01-02 DIAGNOSIS — S72.91XA CLOSED FRACTURE OF RIGHT FEMUR, UNSPECIFIED FRACTURE MORPHOLOGY, UNSPECIFIED PORTION OF FEMUR, INITIAL ENCOUNTER (HCC): ICD-10-CM

## 2025-01-02 DIAGNOSIS — S72.001A CLOSED DISPLACED FRACTURE OF RIGHT FEMORAL NECK (HCC): Primary | ICD-10-CM
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
## 2025-01-02 DIAGNOSIS — S72.001A CLOSED FRACTURE OF NECK OF RIGHT FEMUR, INITIAL ENCOUNTER (HCC): ICD-10-CM

## 2025-01-02 PROBLEM — S72.051K: Status: ACTIVE | Noted: 2025-01-02

## 2025-01-02 LAB
ANION GAP SERPL CALCULATED.3IONS-SCNC: 16 MMOL/L (ref 7–16)
BASOPHILS # BLD: 0.04 K/UL (ref 0–0.2)
BASOPHILS NFR BLD: 0 % (ref 0–2)
BUN SERPL-MCNC: 8 MG/DL (ref 6–23)
CALCIUM SERPL-MCNC: 9.7 MG/DL (ref 8.6–10.2)
CHLORIDE SERPL-SCNC: 97 MMOL/L (ref 98–107)
CO2 SERPL-SCNC: 26 MMOL/L (ref 22–29)
CREAT SERPL-MCNC: 0.5 MG/DL (ref 0.5–1)
EOSINOPHIL # BLD: 0.06 K/UL (ref 0.05–0.5)
EOSINOPHILS RELATIVE PERCENT: 1 % (ref 0–6)
ERYTHROCYTE [DISTWIDTH] IN BLOOD BY AUTOMATED COUNT: 13.5 % (ref 11.5–15)
GFR, ESTIMATED: >90 ML/MIN/1.73M2
GLUCOSE SERPL-MCNC: 126 MG/DL (ref 74–99)
HCT VFR BLD AUTO: 47.9 % (ref 34–48)
HGB BLD-MCNC: 16.5 G/DL (ref 11.5–15.5)
IMM GRANULOCYTES # BLD AUTO: 0.05 K/UL (ref 0–0.58)
IMM GRANULOCYTES NFR BLD: 0 % (ref 0–5)
LYMPHOCYTES NFR BLD: 2.01 K/UL (ref 1.5–4)
LYMPHOCYTES RELATIVE PERCENT: 17 % (ref 20–42)
MCH RBC QN AUTO: 29.7 PG (ref 26–35)
MCHC RBC AUTO-ENTMCNC: 34.4 G/DL (ref 32–34.5)
MCV RBC AUTO: 86.2 FL (ref 80–99.9)
MONOCYTES NFR BLD: 0.54 K/UL (ref 0.1–0.95)
MONOCYTES NFR BLD: 5 % (ref 2–12)
NEUTROPHILS NFR BLD: 77 % (ref 43–80)
NEUTS SEG NFR BLD: 9.13 K/UL (ref 1.8–7.3)
PLATELET # BLD AUTO: 428 K/UL (ref 130–450)
PMV BLD AUTO: 9.9 FL (ref 7–12)
POTASSIUM SERPL-SCNC: 2.7 MMOL/L (ref 3.5–5)
RBC # BLD AUTO: 5.56 M/UL (ref 3.5–5.5)
SODIUM SERPL-SCNC: 139 MMOL/L (ref 132–146)
TROPONIN I SERPL HS-MCNC: 12 NG/L (ref 0–9)
WBC OTHER # BLD: 11.8 K/UL (ref 4.5–11.5)

## 2025-01-02 PROCEDURE — 99285 EMERGENCY DEPT VISIT HI MDM: CPT

## 2025-01-02 PROCEDURE — 71045 X-RAY EXAM CHEST 1 VIEW: CPT

## 2025-01-02 PROCEDURE — 73552 X-RAY EXAM OF FEMUR 2/>: CPT

## 2025-01-02 PROCEDURE — 6360000002 HC RX W HCPCS: Performed by: STUDENT IN AN ORGANIZED HEALTH CARE EDUCATION/TRAINING PROGRAM

## 2025-01-02 PROCEDURE — 80048 BASIC METABOLIC PNL TOTAL CA: CPT

## 2025-01-02 PROCEDURE — 96376 TX/PRO/DX INJ SAME DRUG ADON: CPT

## 2025-01-02 PROCEDURE — 84484 ASSAY OF TROPONIN QUANT: CPT

## 2025-01-02 PROCEDURE — 85025 COMPLETE CBC W/AUTO DIFF WBC: CPT

## 2025-01-02 PROCEDURE — 6360000002 HC RX W HCPCS: Performed by: HOSPITALIST

## 2025-01-02 PROCEDURE — 6370000000 HC RX 637 (ALT 250 FOR IP): Performed by: STUDENT IN AN ORGANIZED HEALTH CARE EDUCATION/TRAINING PROGRAM

## 2025-01-02 PROCEDURE — 1200000000 HC SEMI PRIVATE

## 2025-01-02 PROCEDURE — 6370000000 HC RX 637 (ALT 250 FOR IP): Performed by: HOSPITALIST

## 2025-01-02 PROCEDURE — 73502 X-RAY EXAM HIP UNI 2-3 VIEWS: CPT

## 2025-01-02 PROCEDURE — 6360000002 HC RX W HCPCS

## 2025-01-02 PROCEDURE — 96374 THER/PROPH/DIAG INJ IV PUSH: CPT

## 2025-01-02 PROCEDURE — 6370000000 HC RX 637 (ALT 250 FOR IP)

## 2025-01-02 RX ORDER — POLYETHYLENE GLYCOL 3350 17 G/17G
17 POWDER, FOR SOLUTION ORAL DAILY PRN
Status: DISCONTINUED | OUTPATIENT
Start: 2025-01-02 | End: 2025-01-07 | Stop reason: HOSPADM

## 2025-01-02 RX ORDER — POTASSIUM CHLORIDE 7.45 MG/ML
10 INJECTION INTRAVENOUS PRN
Status: DISCONTINUED | OUTPATIENT
Start: 2025-01-02 | End: 2025-01-03 | Stop reason: SDUPTHER

## 2025-01-02 RX ORDER — ATORVASTATIN CALCIUM 40 MG/1
40 TABLET, FILM COATED ORAL NIGHTLY
Status: DISCONTINUED | OUTPATIENT
Start: 2025-01-02 | End: 2025-01-07 | Stop reason: HOSPADM

## 2025-01-02 RX ORDER — ACETAMINOPHEN 325 MG/1
650 TABLET ORAL EVERY 6 HOURS PRN
Status: DISCONTINUED | OUTPATIENT
Start: 2025-01-02 | End: 2025-01-07 | Stop reason: HOSPADM

## 2025-01-02 RX ORDER — FENTANYL CITRATE 50 UG/ML
25 INJECTION, SOLUTION INTRAMUSCULAR; INTRAVENOUS ONCE
Status: COMPLETED | OUTPATIENT
Start: 2025-01-02 | End: 2025-01-02

## 2025-01-02 RX ORDER — ONDANSETRON 2 MG/ML
4 INJECTION INTRAMUSCULAR; INTRAVENOUS EVERY 6 HOURS PRN
Status: DISCONTINUED | OUTPATIENT
Start: 2025-01-02 | End: 2025-01-03 | Stop reason: SDUPTHER

## 2025-01-02 RX ORDER — SODIUM CHLORIDE 9 MG/ML
INJECTION, SOLUTION INTRAVENOUS PRN
Status: DISCONTINUED | OUTPATIENT
Start: 2025-01-02 | End: 2025-01-07 | Stop reason: HOSPADM

## 2025-01-02 RX ORDER — SODIUM CHLORIDE 0.9 % (FLUSH) 0.9 %
5-40 SYRINGE (ML) INJECTION PRN
Status: DISCONTINUED | OUTPATIENT
Start: 2025-01-02 | End: 2025-01-07 | Stop reason: HOSPADM

## 2025-01-02 RX ORDER — POTASSIUM CHLORIDE 1500 MG/1
40 TABLET, EXTENDED RELEASE ORAL PRN
Status: DISCONTINUED | OUTPATIENT
Start: 2025-01-02 | End: 2025-01-03 | Stop reason: SDUPTHER

## 2025-01-02 RX ORDER — ONDANSETRON 4 MG/1
4 TABLET, ORALLY DISINTEGRATING ORAL EVERY 8 HOURS PRN
Status: DISCONTINUED | OUTPATIENT
Start: 2025-01-02 | End: 2025-01-03 | Stop reason: SDUPTHER

## 2025-01-02 RX ORDER — GABAPENTIN 300 MG/1
300 CAPSULE ORAL 2 TIMES DAILY
Status: DISCONTINUED | OUTPATIENT
Start: 2025-01-02 | End: 2025-01-07 | Stop reason: HOSPADM

## 2025-01-02 RX ORDER — MAGNESIUM SULFATE IN WATER 40 MG/ML
2000 INJECTION, SOLUTION INTRAVENOUS PRN
Status: DISCONTINUED | OUTPATIENT
Start: 2025-01-02 | End: 2025-01-03 | Stop reason: SDUPTHER

## 2025-01-02 RX ORDER — POTASSIUM CHLORIDE 1500 MG/1
40 TABLET, EXTENDED RELEASE ORAL EVERY 4 HOURS
Status: COMPLETED | OUTPATIENT
Start: 2025-01-02 | End: 2025-01-03

## 2025-01-02 RX ORDER — ACETAMINOPHEN 650 MG/1
650 SUPPOSITORY RECTAL EVERY 6 HOURS PRN
Status: DISCONTINUED | OUTPATIENT
Start: 2025-01-02 | End: 2025-01-07 | Stop reason: HOSPADM

## 2025-01-02 RX ORDER — MORPHINE SULFATE 2 MG/ML
2 INJECTION, SOLUTION INTRAMUSCULAR; INTRAVENOUS
Status: DISCONTINUED | OUTPATIENT
Start: 2025-01-02 | End: 2025-01-07 | Stop reason: HOSPADM

## 2025-01-02 RX ORDER — POTASSIUM CHLORIDE 1500 MG/1
40 TABLET, EXTENDED RELEASE ORAL ONCE
Status: COMPLETED | OUTPATIENT
Start: 2025-01-02 | End: 2025-01-02

## 2025-01-02 RX ORDER — SODIUM CHLORIDE 9 MG/ML
INJECTION, SOLUTION INTRAVENOUS CONTINUOUS
Status: ACTIVE | OUTPATIENT
Start: 2025-01-03 | End: 2025-01-03

## 2025-01-02 RX ORDER — SODIUM CHLORIDE 0.9 % (FLUSH) 0.9 %
5-40 SYRINGE (ML) INJECTION EVERY 12 HOURS SCHEDULED
Status: DISCONTINUED | OUTPATIENT
Start: 2025-01-02 | End: 2025-01-07 | Stop reason: HOSPADM

## 2025-01-02 RX ORDER — ACETAMINOPHEN 500 MG
1000 TABLET ORAL ONCE
Status: COMPLETED | OUTPATIENT
Start: 2025-01-02 | End: 2025-01-02

## 2025-01-02 RX ORDER — POTASSIUM CHLORIDE 7.45 MG/ML
10 INJECTION INTRAVENOUS
Status: DISCONTINUED | OUTPATIENT
Start: 2025-01-02 | End: 2025-01-02 | Stop reason: HOSPADM

## 2025-01-02 RX ORDER — ENOXAPARIN SODIUM 100 MG/ML
40 INJECTION SUBCUTANEOUS DAILY
Status: DISCONTINUED | OUTPATIENT
Start: 2025-01-03 | End: 2025-01-04

## 2025-01-02 RX ADMIN — FENTANYL CITRATE 25 MCG: 50 INJECTION, SOLUTION INTRAMUSCULAR; INTRAVENOUS at 17:35

## 2025-01-02 RX ADMIN — POTASSIUM CHLORIDE 40 MEQ: 1500 TABLET, EXTENDED RELEASE ORAL at 22:35

## 2025-01-02 RX ADMIN — FENTANYL CITRATE 25 MCG: 50 INJECTION, SOLUTION INTRAMUSCULAR; INTRAVENOUS at 20:05

## 2025-01-02 RX ADMIN — POTASSIUM CHLORIDE 10 MEQ: 7.46 INJECTION, SOLUTION INTRAVENOUS at 19:32

## 2025-01-02 RX ADMIN — POTASSIUM CHLORIDE 40 MEQ: 1500 TABLET, EXTENDED RELEASE ORAL at 18:36

## 2025-01-02 RX ADMIN — GABAPENTIN 300 MG: 300 CAPSULE ORAL at 22:35

## 2025-01-02 RX ADMIN — ATORVASTATIN CALCIUM 40 MG: 40 TABLET, FILM COATED ORAL at 22:35

## 2025-01-02 RX ADMIN — POTASSIUM CHLORIDE 10 MEQ: 7.46 INJECTION, SOLUTION INTRAVENOUS at 18:36

## 2025-01-02 RX ADMIN — MORPHINE SULFATE 2 MG: 2 INJECTION, SOLUTION INTRAMUSCULAR; INTRAVENOUS at 22:35

## 2025-01-02 RX ADMIN — ACETAMINOPHEN 1000 MG: 500 TABLET, FILM COATED ORAL at 15:35

## 2025-01-02 ASSESSMENT — PAIN DESCRIPTION - ONSET: ONSET: ON-GOING

## 2025-01-02 ASSESSMENT — PAIN SCALES - GENERAL
PAINLEVEL_OUTOF10: 8
PAINLEVEL_OUTOF10: 10
PAINLEVEL_OUTOF10: 10
PAINLEVEL_OUTOF10: 9

## 2025-01-02 ASSESSMENT — PAIN DESCRIPTION - LOCATION
LOCATION: HIP
LOCATION: HIP
LOCATION: LEG

## 2025-01-02 ASSESSMENT — ENCOUNTER SYMPTOMS
EYES NEGATIVE: 1
ALLERGIC/IMMUNOLOGIC NEGATIVE: 1
GASTROINTESTINAL NEGATIVE: 1

## 2025-01-02 ASSESSMENT — PAIN - FUNCTIONAL ASSESSMENT
PAIN_FUNCTIONAL_ASSESSMENT: 0-10
PAIN_FUNCTIONAL_ASSESSMENT: PREVENTS OR INTERFERES SOME ACTIVE ACTIVITIES AND ADLS
PAIN_FUNCTIONAL_ASSESSMENT: PREVENTS OR INTERFERES WITH ALL ACTIVE AND SOME PASSIVE ACTIVITIES

## 2025-01-02 ASSESSMENT — PAIN DESCRIPTION - DESCRIPTORS
DESCRIPTORS: DISCOMFORT;SHARP
DESCRIPTORS: ACHING
DESCRIPTORS: ACHING;SHOOTING;DISCOMFORT

## 2025-01-02 ASSESSMENT — PAIN DESCRIPTION - FREQUENCY: FREQUENCY: CONTINUOUS

## 2025-01-02 ASSESSMENT — PAIN DESCRIPTION - ORIENTATION
ORIENTATION: RIGHT

## 2025-01-02 ASSESSMENT — PAIN DESCRIPTION - PAIN TYPE: TYPE: ACUTE PAIN

## 2025-01-02 NOTE — ED PROVIDER NOTES
01/02/25 1258 01/02/25 1832   BP: 138/75 (!) 146/78   Pulse: 93 72   Resp: 16 16   Temp: 98 °F (36.7 °C) 98.7 °F (37.1 °C)   TempSrc: Oral Oral   SpO2: 91% 94%   Weight: 56.7 kg (125 lb)        Patient was given the following medications:  Medications   acetaminophen (TYLENOL) tablet 1,000 mg (1,000 mg Oral Given 1/2/25 1535)   fentaNYL (SUBLIMAZE) injection 25 mcg (25 mcg IntraVENous Given 1/2/25 1735)   potassium chloride (KLOR-CON M) extended release tablet 40 mEq (40 mEq Oral Given 1/2/25 1836)   fentaNYL (SUBLIMAZE) injection 25 mcg (25 mcg IntraVENous Given 1/2/25 2005)         CONSULTS: (Who and What was discussed)  None  Discussion with Other Profesionals : Consultant Dr. Cochran and Dr. Rothman    Social Determinants : None    Records Reviewed : None    CC/HPI Summary, DDx, ED Course, and Reassessment: Krista Johnson is a 69 y.o. female who presents to the ED with complaints of right hip and upper leg pain since having a fall on Sunday.  Patient states she has neuropathy and tripped over her foot seeing her to fall onto her right side on the carpeted floor.  Patient states she was able to get up on her own.  Patient states she has been ambulatory since the fall but continues to have pain.  Patient states she has been taking ibuprofen at home with no relief in symptoms.  Patient denies any numbness, tingling, weakness.  Patient denies any head injury, loss of consciousness, or use of blood thinners.  Patient denies any other injury.  Patient denies any chest pain, shortness of breath, abdominal pain, headache, lightheadedness, dizziness, fever, chills, body aches.  Differential diagnosis includes but is not limited to hip fracture, femur fracture, contusion.  X-ray right hip and pelvis and x-ray of right femur obtained.  Patient received Tylenol.  X-ray of right hip showed moderate to severe bilateral hip joint degenerative changes, no acute abnormality of the hip.  X-ray right femur showed right femoral neck

## 2025-01-03 ENCOUNTER — ANESTHESIA EVENT (OUTPATIENT)
Dept: OPERATING ROOM | Age: 70
End: 2025-01-03
Payer: COMMERCIAL

## 2025-01-03 LAB
ANION GAP SERPL CALCULATED.3IONS-SCNC: 9 MMOL/L (ref 7–16)
BASOPHILS # BLD: 0.04 K/UL (ref 0–0.2)
BASOPHILS NFR BLD: 0 % (ref 0–2)
BUN SERPL-MCNC: 6 MG/DL (ref 6–23)
CALCIUM SERPL-MCNC: 9.1 MG/DL (ref 8.6–10.2)
CHLORIDE SERPL-SCNC: 105 MMOL/L (ref 98–107)
CO2 SERPL-SCNC: 26 MMOL/L (ref 22–29)
CREAT SERPL-MCNC: 0.4 MG/DL (ref 0.5–1)
EOSINOPHIL # BLD: 0.12 K/UL (ref 0.05–0.5)
EOSINOPHILS RELATIVE PERCENT: 1 % (ref 0–6)
ERYTHROCYTE [DISTWIDTH] IN BLOOD BY AUTOMATED COUNT: 13.9 % (ref 11.5–15)
GFR, ESTIMATED: >90 ML/MIN/1.73M2
GLUCOSE SERPL-MCNC: 107 MG/DL (ref 74–99)
HCT VFR BLD AUTO: 43.5 % (ref 34–48)
HGB BLD-MCNC: 14.7 G/DL (ref 11.5–15.5)
IMM GRANULOCYTES # BLD AUTO: 0.03 K/UL (ref 0–0.58)
IMM GRANULOCYTES NFR BLD: 0 % (ref 0–5)
LYMPHOCYTES NFR BLD: 2.58 K/UL (ref 1.5–4)
LYMPHOCYTES RELATIVE PERCENT: 23 % (ref 20–42)
MCH RBC QN AUTO: 30.3 PG (ref 26–35)
MCHC RBC AUTO-ENTMCNC: 33.8 G/DL (ref 32–34.5)
MCV RBC AUTO: 89.7 FL (ref 80–99.9)
MONOCYTES NFR BLD: 0.6 K/UL (ref 0.1–0.95)
MONOCYTES NFR BLD: 5 % (ref 2–12)
NEUTROPHILS NFR BLD: 70 % (ref 43–80)
NEUTS SEG NFR BLD: 8.01 K/UL (ref 1.8–7.3)
PLATELET # BLD AUTO: 367 K/UL (ref 130–450)
PMV BLD AUTO: 9.6 FL (ref 7–12)
POTASSIUM SERPL-SCNC: 5.1 MMOL/L (ref 3.5–5)
RBC # BLD AUTO: 4.85 M/UL (ref 3.5–5.5)
SODIUM SERPL-SCNC: 140 MMOL/L (ref 132–146)
WBC OTHER # BLD: 11.4 K/UL (ref 4.5–11.5)

## 2025-01-03 PROCEDURE — 2500000003 HC RX 250 WO HCPCS: Performed by: ORTHOPAEDIC SURGERY

## 2025-01-03 PROCEDURE — 2580000003 HC RX 258: Performed by: ORTHOPAEDIC SURGERY

## 2025-01-03 PROCEDURE — 6370000000 HC RX 637 (ALT 250 FOR IP): Performed by: HOSPITALIST

## 2025-01-03 PROCEDURE — 1200000000 HC SEMI PRIVATE

## 2025-01-03 PROCEDURE — 2500000003 HC RX 250 WO HCPCS: Performed by: HOSPITALIST

## 2025-01-03 PROCEDURE — 80048 BASIC METABOLIC PNL TOTAL CA: CPT

## 2025-01-03 PROCEDURE — 94640 AIRWAY INHALATION TREATMENT: CPT

## 2025-01-03 PROCEDURE — 6370000000 HC RX 637 (ALT 250 FOR IP)

## 2025-01-03 PROCEDURE — 6360000002 HC RX W HCPCS: Performed by: HOSPITALIST

## 2025-01-03 PROCEDURE — 2580000003 HC RX 258: Performed by: HOSPITALIST

## 2025-01-03 PROCEDURE — 6360000002 HC RX W HCPCS: Performed by: INTERNAL MEDICINE

## 2025-01-03 PROCEDURE — 85025 COMPLETE CBC W/AUTO DIFF WBC: CPT

## 2025-01-03 RX ORDER — SODIUM CHLORIDE 9 MG/ML
INJECTION, SOLUTION INTRAVENOUS PRN
Status: DISCONTINUED | OUTPATIENT
Start: 2025-01-03 | End: 2025-01-07 | Stop reason: HOSPADM

## 2025-01-03 RX ORDER — ONDANSETRON 2 MG/ML
4 INJECTION INTRAMUSCULAR; INTRAVENOUS EVERY 6 HOURS PRN
Status: DISCONTINUED | OUTPATIENT
Start: 2025-01-03 | End: 2025-01-07 | Stop reason: SDUPTHER

## 2025-01-03 RX ORDER — POTASSIUM CHLORIDE 7.45 MG/ML
10 INJECTION INTRAVENOUS PRN
Status: DISCONTINUED | OUTPATIENT
Start: 2025-01-03 | End: 2025-01-07 | Stop reason: HOSPADM

## 2025-01-03 RX ORDER — SENNOSIDES A AND B 8.6 MG/1
1 TABLET, FILM COATED ORAL 2 TIMES DAILY PRN
Status: DISCONTINUED | OUTPATIENT
Start: 2025-01-03 | End: 2025-01-07 | Stop reason: ALTCHOICE

## 2025-01-03 RX ORDER — IPRATROPIUM BROMIDE AND ALBUTEROL SULFATE 2.5; .5 MG/3ML; MG/3ML
1 SOLUTION RESPIRATORY (INHALATION)
Status: DISCONTINUED | OUTPATIENT
Start: 2025-01-03 | End: 2025-01-04

## 2025-01-03 RX ORDER — MAGNESIUM SULFATE IN WATER 40 MG/ML
2000 INJECTION, SOLUTION INTRAVENOUS PRN
Status: DISCONTINUED | OUTPATIENT
Start: 2025-01-03 | End: 2025-01-07 | Stop reason: HOSPADM

## 2025-01-03 RX ORDER — SODIUM CHLORIDE 0.9 % (FLUSH) 0.9 %
5-40 SYRINGE (ML) INJECTION EVERY 12 HOURS SCHEDULED
Status: DISCONTINUED | OUTPATIENT
Start: 2025-01-03 | End: 2025-01-07 | Stop reason: HOSPADM

## 2025-01-03 RX ORDER — DEXTROSE MONOHYDRATE AND SODIUM CHLORIDE 5; .45 G/100ML; G/100ML
INJECTION, SOLUTION INTRAVENOUS CONTINUOUS
Status: DISCONTINUED | OUTPATIENT
Start: 2025-01-03 | End: 2025-01-04

## 2025-01-03 RX ORDER — POTASSIUM CHLORIDE 1500 MG/1
40 TABLET, EXTENDED RELEASE ORAL PRN
Status: DISCONTINUED | OUTPATIENT
Start: 2025-01-03 | End: 2025-01-07 | Stop reason: HOSPADM

## 2025-01-03 RX ORDER — SODIUM CHLORIDE 0.9 % (FLUSH) 0.9 %
5-40 SYRINGE (ML) INJECTION PRN
Status: DISCONTINUED | OUTPATIENT
Start: 2025-01-03 | End: 2025-01-07 | Stop reason: HOSPADM

## 2025-01-03 RX ORDER — ONDANSETRON 4 MG/1
4 TABLET, ORALLY DISINTEGRATING ORAL EVERY 8 HOURS PRN
Status: DISCONTINUED | OUTPATIENT
Start: 2025-01-03 | End: 2025-01-07 | Stop reason: SDUPTHER

## 2025-01-03 RX ORDER — METHYLPREDNISOLONE SODIUM SUCCINATE 40 MG/ML
40 INJECTION INTRAMUSCULAR; INTRAVENOUS EVERY 8 HOURS
Status: COMPLETED | OUTPATIENT
Start: 2025-01-03 | End: 2025-01-04

## 2025-01-03 RX ADMIN — IPRATROPIUM BROMIDE AND ALBUTEROL SULFATE 1 DOSE: .5; 2.5 SOLUTION RESPIRATORY (INHALATION) at 19:54

## 2025-01-03 RX ADMIN — MORPHINE SULFATE 2 MG: 2 INJECTION, SOLUTION INTRAMUSCULAR; INTRAVENOUS at 01:34

## 2025-01-03 RX ADMIN — MORPHINE SULFATE 2 MG: 2 INJECTION, SOLUTION INTRAMUSCULAR; INTRAVENOUS at 16:26

## 2025-01-03 RX ADMIN — SODIUM CHLORIDE: 9 INJECTION, SOLUTION INTRAVENOUS at 00:56

## 2025-01-03 RX ADMIN — POTASSIUM CHLORIDE 40 MEQ: 1500 TABLET, EXTENDED RELEASE ORAL at 01:33

## 2025-01-03 RX ADMIN — GABAPENTIN 300 MG: 300 CAPSULE ORAL at 20:18

## 2025-01-03 RX ADMIN — FAMOTIDINE 20 MG: 10 INJECTION, SOLUTION INTRAVENOUS at 16:24

## 2025-01-03 RX ADMIN — IPRATROPIUM BROMIDE AND ALBUTEROL SULFATE 1 DOSE: .5; 2.5 SOLUTION RESPIRATORY (INHALATION) at 15:58

## 2025-01-03 RX ADMIN — MORPHINE SULFATE 2 MG: 2 INJECTION, SOLUTION INTRAMUSCULAR; INTRAVENOUS at 21:30

## 2025-01-03 RX ADMIN — MORPHINE SULFATE 2 MG: 2 INJECTION, SOLUTION INTRAMUSCULAR; INTRAVENOUS at 05:27

## 2025-01-03 RX ADMIN — ATORVASTATIN CALCIUM 40 MG: 40 TABLET, FILM COATED ORAL at 20:18

## 2025-01-03 RX ADMIN — IPRATROPIUM BROMIDE AND ALBUTEROL SULFATE 1 DOSE: .5; 2.5 SOLUTION RESPIRATORY (INHALATION) at 12:32

## 2025-01-03 RX ADMIN — MORPHINE SULFATE 2 MG: 2 INJECTION, SOLUTION INTRAMUSCULAR; INTRAVENOUS at 12:55

## 2025-01-03 RX ADMIN — MORPHINE SULFATE 2 MG: 2 INJECTION, SOLUTION INTRAMUSCULAR; INTRAVENOUS at 09:29

## 2025-01-03 RX ADMIN — METHYLPREDNISOLONE SODIUM SUCCINATE 40 MG: 40 INJECTION INTRAMUSCULAR; INTRAVENOUS at 12:55

## 2025-01-03 RX ADMIN — GABAPENTIN 300 MG: 300 CAPSULE ORAL at 09:29

## 2025-01-03 RX ADMIN — METHYLPREDNISOLONE SODIUM SUCCINATE 40 MG: 40 INJECTION INTRAMUSCULAR; INTRAVENOUS at 20:18

## 2025-01-03 RX ADMIN — DEXTROSE AND SODIUM CHLORIDE: 5; 450 INJECTION, SOLUTION INTRAVENOUS at 16:38

## 2025-01-03 RX ADMIN — SODIUM CHLORIDE, PRESERVATIVE FREE 10 ML: 5 INJECTION INTRAVENOUS at 12:55

## 2025-01-03 ASSESSMENT — PAIN SCALES - GENERAL
PAINLEVEL_OUTOF10: 9
PAINLEVEL_OUTOF10: 9
PAINLEVEL_OUTOF10: 7

## 2025-01-03 ASSESSMENT — PAIN DESCRIPTION - ORIENTATION
ORIENTATION: RIGHT

## 2025-01-03 ASSESSMENT — PAIN DESCRIPTION - DESCRIPTORS
DESCRIPTORS: ACHING;DISCOMFORT
DESCRIPTORS: ACHING;DISCOMFORT

## 2025-01-03 ASSESSMENT — PAIN DESCRIPTION - LOCATION
LOCATION: HIP
LOCATION: HIP
LOCATION: HIP;LEG

## 2025-01-03 NOTE — PLAN OF CARE

## 2025-01-03 NOTE — CONSULTS
5-40 mL, 5-40 mL, IntraVENous, 2 times per day  sodium chloride flush 0.9 % injection 5-40 mL, 5-40 mL, IntraVENous, PRN  0.9 % sodium chloride infusion, , IntraVENous, PRN  potassium chloride (KLOR-CON M) extended release tablet 40 mEq, 40 mEq, Oral, PRN **OR** potassium bicarb-citric acid (EFFER-K) effervescent tablet 40 mEq, 40 mEq, Oral, PRN **OR** potassium chloride 10 mEq/100 mL IVPB (Peripheral Line), 10 mEq, IntraVENous, PRN  magnesium sulfate 2000 mg in 50 mL IVPB premix, 2,000 mg, IntraVENous, PRN  enoxaparin (LOVENOX) injection 40 mg, 40 mg, SubCUTAneous, Daily  ondansetron (ZOFRAN-ODT) disintegrating tablet 4 mg, 4 mg, Oral, Q8H PRN **OR** ondansetron (ZOFRAN) injection 4 mg, 4 mg, IntraVENous, Q6H PRN  polyethylene glycol (GLYCOLAX) packet 17 g, 17 g, Oral, Daily PRN  acetaminophen (TYLENOL) tablet 650 mg, 650 mg, Oral, Q6H PRN **OR** acetaminophen (TYLENOL) suppository 650 mg, 650 mg, Rectal, Q6H PRN  0.9 % sodium chloride infusion, , IntraVENous, Continuous  morphine (PF) injection 2 mg, 2 mg, IntraVENous, Q3H PRN        Allergies:  Codeine     Social History:    TOBACCO:   reports that she has been smoking cigarettes. She started smoking about 44 years ago. She has a 41 pack-year smoking history. She has never used smokeless tobacco.     Family History:   Family History   No family history on file.        REVIEW OF SYSTEMS:    RESPIRATORY:  cough  MUSCULOSKELETAL:  right hip pain  Remainder of complete ROS is negative.    O:  Alert and oriented x 3  Temperature 98.2, pulse 81, respirations 18, blood pressure 160/77 O2 saturation 90%  Head normocephalic  Breathing nonlabored  Abdomen soft nontender  Positive right hip pain with logroll  Minimal leg length inequality  No knee tenderness or effusion  Negative Homans  Palpable pulse right pedal pulse  Amputation left first and second toes  No pain with range of motion of the bilateral upper extremities or left lower extremity    X-rays: Displaced right 
oxygenation  Should be ok to undergo surgery if required, on RA, stable/slightly better with breathing, supposedly no issues with respiratory function with ambulation prior to admission, unclear lung function and not on any lung medications prior to admission, min-mod risk for surgery  Will need to work on smoking cessation, will need PFT as out-pt to assess lung function      Time at the bedside, reviewing labs and radiographs, reviewing notes and consultations, discussing with staff and family was more than 55 minutes.    Thanks for letting us see this patient in consultation.  Please contact us with any questions. Office [phone] or after hours through qcue, x [phone].

## 2025-01-03 NOTE — ACP (ADVANCE CARE PLANNING)
Advance Care Planning   Healthcare Decision Maker:    Primary Decision Maker: Tracey Hemphill - Niece/Nephew - [phone]    Click here to complete Healthcare Decision Makers including selection of the Healthcare Decision Maker Relationship (ie \"Primary\").

## 2025-01-03 NOTE — H&P
OT  She also has underlying undiagnosed COPD due to history of tobacco abuse-DuoNeb breathing treatments, Solu-Medrol, pulmonary consultation added  Potassium appears to be overcorrected-continue to monitor-IV fluid has been started  Her niece and nephew were present at bedside    Electronically signed by Bryan Salter DO on 1/3/2025 at 11:42 AM    I can be reached through [employer].

## 2025-01-03 NOTE — CARE COORDINATION
Introduced my self and provided explanation of CM role to patient. Patient is awake, alert, and aware of current diagnosis and discharge planning. Patient is from home alone typically independently. Patient admitted after a fall. Ortho surgery consulted, await plan. Spoke to patient at bedside. Patient is agreeable to [facility] acute rehab IF needed and is agreeable to [facility]. Patient wishes to use [facility]. Referral made to [facility] with Acute rehab and [facility]. Await input and await PT/OT evals after surgery.   Electronically signed by Balbina Bonilla RN on 1/3/2025 at 9:17 AM

## 2025-01-03 NOTE — CARE COORDINATION
Internal Medicine On-call Care Coordination Note     I was called by the ED physician because they recommended admission for this patient and we cover their PCP.  The history as I understand it after discussion with the ED physician is as follows:     Admit from [facility]  Right femur fracture     I placed admission orders.  Including:     Admit for ortho to evaluate  Encourage pain control  Hypokalemia noted     Dr. Salter or his coverage will see the patient tomorrow for H&P and review of all orders.     Electronically signed by Mendel Cochran MD on 1/2/2025 at 9:18 PM

## 2025-01-04 ENCOUNTER — APPOINTMENT (OUTPATIENT)
Dept: GENERAL RADIOLOGY | Age: 70
DRG: 522 | End: 2025-01-04
Attending: HOSPITALIST
Payer: COMMERCIAL

## 2025-01-04 ENCOUNTER — ANESTHESIA (OUTPATIENT)
Dept: OPERATING ROOM | Age: 70
End: 2025-01-04
Payer: COMMERCIAL

## 2025-01-04 LAB
ANION GAP SERPL CALCULATED.3IONS-SCNC: 10 MMOL/L (ref 7–16)
BASOPHILS # BLD: 0.03 K/UL (ref 0–0.2)
BASOPHILS NFR BLD: 0 % (ref 0–2)
BUN SERPL-MCNC: 6 MG/DL (ref 6–23)
CALCIUM SERPL-MCNC: 9.2 MG/DL (ref 8.6–10.2)
CHLORIDE SERPL-SCNC: 103 MMOL/L (ref 98–107)
CO2 SERPL-SCNC: 25 MMOL/L (ref 22–29)
CREAT SERPL-MCNC: 0.4 MG/DL (ref 0.5–1)
EOSINOPHIL # BLD: 0 K/UL (ref 0.05–0.5)
EOSINOPHILS RELATIVE PERCENT: 0 % (ref 0–6)
ERYTHROCYTE [DISTWIDTH] IN BLOOD BY AUTOMATED COUNT: 13.7 % (ref 11.5–15)
GFR, ESTIMATED: >90 ML/MIN/1.73M2
GLUCOSE SERPL-MCNC: 289 MG/DL (ref 74–99)
HCT VFR BLD AUTO: 42.9 % (ref 34–48)
HGB BLD-MCNC: 14.3 G/DL (ref 11.5–15.5)
IMM GRANULOCYTES # BLD AUTO: 0.05 K/UL (ref 0–0.58)
IMM GRANULOCYTES NFR BLD: 1 % (ref 0–5)
LYMPHOCYTES NFR BLD: 0.63 K/UL (ref 1.5–4)
LYMPHOCYTES RELATIVE PERCENT: 6 % (ref 20–42)
MCH RBC QN AUTO: 30.2 PG (ref 26–35)
MCHC RBC AUTO-ENTMCNC: 33.3 G/DL (ref 32–34.5)
MCV RBC AUTO: 90.5 FL (ref 80–99.9)
MONOCYTES NFR BLD: 0.05 K/UL (ref 0.1–0.95)
MONOCYTES NFR BLD: 1 % (ref 2–12)
NEUTROPHILS NFR BLD: 93 % (ref 43–80)
NEUTS SEG NFR BLD: 10.23 K/UL (ref 1.8–7.3)
PLATELET # BLD AUTO: 372 K/UL (ref 130–450)
PMV BLD AUTO: 10 FL (ref 7–12)
POTASSIUM SERPL-SCNC: 4.3 MMOL/L (ref 3.5–5)
RBC # BLD AUTO: 4.74 M/UL (ref 3.5–5.5)
SODIUM SERPL-SCNC: 138 MMOL/L (ref 132–146)
WBC OTHER # BLD: 11 K/UL (ref 4.5–11.5)

## 2025-01-04 PROCEDURE — 2580000003 HC RX 258: Performed by: NURSE ANESTHETIST, CERTIFIED REGISTERED

## 2025-01-04 PROCEDURE — 0SR904A REPLACEMENT OF RIGHT HIP JOINT WITH CERAMIC ON POLYETHYLENE SYNTHETIC SUBSTITUTE, UNCEMENTED, OPEN APPROACH: ICD-10-PCS | Performed by: ORTHOPAEDIC SURGERY

## 2025-01-04 PROCEDURE — 6370000000 HC RX 637 (ALT 250 FOR IP): Performed by: ORTHOPAEDIC SURGERY

## 2025-01-04 PROCEDURE — 3600000005 HC SURGERY LEVEL 5 BASE: Performed by: ORTHOPAEDIC SURGERY

## 2025-01-04 PROCEDURE — 2500000003 HC RX 250 WO HCPCS: Performed by: NURSE ANESTHETIST, CERTIFIED REGISTERED

## 2025-01-04 PROCEDURE — 7100000000 HC PACU RECOVERY - FIRST 15 MIN: Performed by: ORTHOPAEDIC SURGERY

## 2025-01-04 PROCEDURE — 6360000002 HC RX W HCPCS: Performed by: ORTHOPAEDIC SURGERY

## 2025-01-04 PROCEDURE — 2500000003 HC RX 250 WO HCPCS: Performed by: ORTHOPAEDIC SURGERY

## 2025-01-04 PROCEDURE — 6370000000 HC RX 637 (ALT 250 FOR IP)

## 2025-01-04 PROCEDURE — 97161 PT EVAL LOW COMPLEX 20 MIN: CPT

## 2025-01-04 PROCEDURE — C1776 JOINT DEVICE (IMPLANTABLE): HCPCS | Performed by: ORTHOPAEDIC SURGERY

## 2025-01-04 PROCEDURE — 97530 THERAPEUTIC ACTIVITIES: CPT

## 2025-01-04 PROCEDURE — 3700000001 HC ADD 15 MINUTES (ANESTHESIA): Performed by: ORTHOPAEDIC SURGERY

## 2025-01-04 PROCEDURE — 3600000015 HC SURGERY LEVEL 5 ADDTL 15MIN: Performed by: ORTHOPAEDIC SURGERY

## 2025-01-04 PROCEDURE — 88311 DECALCIFY TISSUE: CPT

## 2025-01-04 PROCEDURE — 1200000000 HC SEMI PRIVATE

## 2025-01-04 PROCEDURE — 3700000000 HC ANESTHESIA ATTENDED CARE: Performed by: ORTHOPAEDIC SURGERY

## 2025-01-04 PROCEDURE — 85025 COMPLETE CBC W/AUTO DIFF WBC: CPT

## 2025-01-04 PROCEDURE — 2720000010 HC SURG SUPPLY STERILE: Performed by: ORTHOPAEDIC SURGERY

## 2025-01-04 PROCEDURE — 88305 TISSUE EXAM BY PATHOLOGIST: CPT

## 2025-01-04 PROCEDURE — 73501 X-RAY EXAM HIP UNI 1 VIEW: CPT

## 2025-01-04 PROCEDURE — 2500000003 HC RX 250 WO HCPCS

## 2025-01-04 PROCEDURE — 6360000002 HC RX W HCPCS: Performed by: INTERNAL MEDICINE

## 2025-01-04 PROCEDURE — 6360000002 HC RX W HCPCS: Performed by: HOSPITALIST

## 2025-01-04 PROCEDURE — 7100000001 HC PACU RECOVERY - ADDTL 15 MIN: Performed by: ORTHOPAEDIC SURGERY

## 2025-01-04 PROCEDURE — 97165 OT EVAL LOW COMPLEX 30 MIN: CPT

## 2025-01-04 PROCEDURE — 94640 AIRWAY INHALATION TREATMENT: CPT

## 2025-01-04 PROCEDURE — 6370000000 HC RX 637 (ALT 250 FOR IP): Performed by: INTERNAL MEDICINE

## 2025-01-04 PROCEDURE — 6360000002 HC RX W HCPCS: Performed by: ANESTHESIOLOGY

## 2025-01-04 PROCEDURE — 80048 BASIC METABOLIC PNL TOTAL CA: CPT

## 2025-01-04 PROCEDURE — 6360000002 HC RX W HCPCS: Performed by: NURSE ANESTHETIST, CERTIFIED REGISTERED

## 2025-01-04 PROCEDURE — 2709999900 HC NON-CHARGEABLE SUPPLY: Performed by: ORTHOPAEDIC SURGERY

## 2025-01-04 DEVICE — IMPLANTABLE DEVICE
Type: IMPLANTABLE DEVICE | Site: HIP | Status: FUNCTIONAL
Brand: G7® ACETABULAR SYSTEM

## 2025-01-04 DEVICE — IMPLANTABLE DEVICE
Type: IMPLANTABLE DEVICE | Site: HIP | Status: FUNCTIONAL
Brand: AVENIR COMPLETE™

## 2025-01-04 DEVICE — BIOLOX® OPTION, HEAD, M, Ø 28/0, TAPER 12/14
Type: IMPLANTABLE DEVICE | Site: HIP | Status: FUNCTIONAL
Brand: BIOLOX® OPTION

## 2025-01-04 DEVICE — IMPLANTABLE DEVICE
Type: IMPLANTABLE DEVICE | Site: HIP | Status: FUNCTIONAL
Brand: G7® DUAL MOBILITY ACETABULAR SYSTEM

## 2025-01-04 DEVICE — IMPLANTABLE DEVICE
Type: IMPLANTABLE DEVICE | Site: HIP | Status: FUNCTIONAL
Brand: VIVACIT-E®

## 2025-01-04 RX ORDER — SENNA AND DOCUSATE SODIUM 50; 8.6 MG/1; MG/1
1 TABLET, FILM COATED ORAL 2 TIMES DAILY
Status: DISCONTINUED | OUTPATIENT
Start: 2025-01-04 | End: 2025-01-07 | Stop reason: HOSPADM

## 2025-01-04 RX ORDER — ASPIRIN 81 MG/1
81 TABLET ORAL 2 TIMES DAILY
Status: DISCONTINUED | OUTPATIENT
Start: 2025-01-04 | End: 2025-01-07 | Stop reason: HOSPADM

## 2025-01-04 RX ORDER — DIPHENHYDRAMINE HYDROCHLORIDE 50 MG/ML
INJECTION INTRAMUSCULAR; INTRAVENOUS
Status: DISCONTINUED | OUTPATIENT
Start: 2025-01-04 | End: 2025-01-04 | Stop reason: SDUPTHER

## 2025-01-04 RX ORDER — IPRATROPIUM BROMIDE AND ALBUTEROL SULFATE 2.5; .5 MG/3ML; MG/3ML
1 SOLUTION RESPIRATORY (INHALATION) 3 TIMES DAILY
Status: DISCONTINUED | OUTPATIENT
Start: 2025-01-04 | End: 2025-01-07

## 2025-01-04 RX ORDER — PROPOFOL 10 MG/ML
INJECTION, EMULSION INTRAVENOUS
Status: DISCONTINUED | OUTPATIENT
Start: 2025-01-04 | End: 2025-01-04 | Stop reason: SDUPTHER

## 2025-01-04 RX ORDER — SODIUM CHLORIDE 0.9 % (FLUSH) 0.9 %
5-40 SYRINGE (ML) INJECTION EVERY 12 HOURS SCHEDULED
Status: DISCONTINUED | OUTPATIENT
Start: 2025-01-04 | End: 2025-01-04 | Stop reason: HOSPADM

## 2025-01-04 RX ORDER — DIPHENHYDRAMINE HCL 25 MG
25 TABLET ORAL EVERY 6 HOURS PRN
Status: DISCONTINUED | OUTPATIENT
Start: 2025-01-04 | End: 2025-01-07 | Stop reason: HOSPADM

## 2025-01-04 RX ORDER — ONDANSETRON 2 MG/ML
4 INJECTION INTRAMUSCULAR; INTRAVENOUS EVERY 6 HOURS PRN
Status: DISCONTINUED | OUTPATIENT
Start: 2025-01-04 | End: 2025-01-07 | Stop reason: HOSPADM

## 2025-01-04 RX ORDER — FAMOTIDINE 10 MG/ML
INJECTION, SOLUTION INTRAVENOUS
Status: COMPLETED
Start: 2025-01-04 | End: 2025-01-04

## 2025-01-04 RX ORDER — ONDANSETRON 2 MG/ML
INJECTION INTRAMUSCULAR; INTRAVENOUS
Status: DISCONTINUED | OUTPATIENT
Start: 2025-01-04 | End: 2025-01-04 | Stop reason: SDUPTHER

## 2025-01-04 RX ORDER — DIPHENHYDRAMINE HYDROCHLORIDE 50 MG/ML
25 INJECTION INTRAMUSCULAR; INTRAVENOUS EVERY 6 HOURS PRN
Status: DISCONTINUED | OUTPATIENT
Start: 2025-01-04 | End: 2025-01-07 | Stop reason: HOSPADM

## 2025-01-04 RX ORDER — CYCLOBENZAPRINE HCL 10 MG
10 TABLET ORAL EVERY 12 HOURS PRN
Status: DISCONTINUED | OUTPATIENT
Start: 2025-01-04 | End: 2025-01-07 | Stop reason: HOSPADM

## 2025-01-04 RX ORDER — SODIUM CHLORIDE 0.9 % (FLUSH) 0.9 %
5-40 SYRINGE (ML) INJECTION PRN
Status: DISCONTINUED | OUTPATIENT
Start: 2025-01-04 | End: 2025-01-04 | Stop reason: HOSPADM

## 2025-01-04 RX ORDER — ONDANSETRON 4 MG/1
4 TABLET, ORALLY DISINTEGRATING ORAL EVERY 8 HOURS PRN
Status: DISCONTINUED | OUTPATIENT
Start: 2025-01-04 | End: 2025-01-07 | Stop reason: HOSPADM

## 2025-01-04 RX ORDER — FENTANYL CITRATE 50 UG/ML
INJECTION, SOLUTION INTRAMUSCULAR; INTRAVENOUS
Status: DISCONTINUED | OUTPATIENT
Start: 2025-01-04 | End: 2025-01-04 | Stop reason: SDUPTHER

## 2025-01-04 RX ORDER — OXYCODONE HYDROCHLORIDE 5 MG/1
5 TABLET ORAL EVERY 4 HOURS PRN
Status: DISCONTINUED | OUTPATIENT
Start: 2025-01-04 | End: 2025-01-07 | Stop reason: HOSPADM

## 2025-01-04 RX ORDER — NALOXONE HYDROCHLORIDE 0.4 MG/ML
INJECTION, SOLUTION INTRAMUSCULAR; INTRAVENOUS; SUBCUTANEOUS PRN
Status: DISCONTINUED | OUTPATIENT
Start: 2025-01-04 | End: 2025-01-04 | Stop reason: HOSPADM

## 2025-01-04 RX ORDER — SODIUM CHLORIDE, SODIUM LACTATE, POTASSIUM CHLORIDE, CALCIUM CHLORIDE 600; 310; 30; 20 MG/100ML; MG/100ML; MG/100ML; MG/100ML
INJECTION, SOLUTION INTRAVENOUS CONTINUOUS
Status: DISCONTINUED | OUTPATIENT
Start: 2025-01-04 | End: 2025-01-04

## 2025-01-04 RX ORDER — TRANEXAMIC ACID 10 MG/ML
INJECTION, SOLUTION INTRAVENOUS
Status: COMPLETED
Start: 2025-01-04 | End: 2025-01-04

## 2025-01-04 RX ORDER — WATER 10 ML/10ML
INJECTION INTRAMUSCULAR; INTRAVENOUS; SUBCUTANEOUS
Status: DISPENSED
Start: 2025-01-04 | End: 2025-01-04

## 2025-01-04 RX ORDER — TRANEXAMIC ACID 10 MG/ML
1000 INJECTION, SOLUTION INTRAVENOUS
Status: COMPLETED | OUTPATIENT
Start: 2025-01-04 | End: 2025-01-04

## 2025-01-04 RX ORDER — SODIUM CHLORIDE 9 MG/ML
INJECTION, SOLUTION INTRAVENOUS
Status: DISCONTINUED | OUTPATIENT
Start: 2025-01-04 | End: 2025-01-04 | Stop reason: SDUPTHER

## 2025-01-04 RX ORDER — FAMOTIDINE 20 MG/1
20 TABLET, FILM COATED ORAL DAILY
Status: DISCONTINUED | OUTPATIENT
Start: 2025-01-04 | End: 2025-01-07 | Stop reason: HOSPADM

## 2025-01-04 RX ORDER — SODIUM CHLORIDE 0.9 % (FLUSH) 0.9 %
5-40 SYRINGE (ML) INJECTION EVERY 12 HOURS SCHEDULED
Status: DISCONTINUED | OUTPATIENT
Start: 2025-01-04 | End: 2025-01-07 | Stop reason: HOSPADM

## 2025-01-04 RX ORDER — BUPIVACAINE HYDROCHLORIDE 7.5 MG/ML
INJECTION, SOLUTION INTRASPINAL
Status: DISCONTINUED | OUTPATIENT
Start: 2025-01-04 | End: 2025-01-04 | Stop reason: SDUPTHER

## 2025-01-04 RX ORDER — LIDOCAINE HYDROCHLORIDE 20 MG/ML
INJECTION, SOLUTION EPIDURAL; INFILTRATION; INTRACAUDAL; PERINEURAL
Status: DISCONTINUED | OUTPATIENT
Start: 2025-01-04 | End: 2025-01-04 | Stop reason: SDUPTHER

## 2025-01-04 RX ORDER — SODIUM CHLORIDE 0.9 % (FLUSH) 0.9 %
5-40 SYRINGE (ML) INJECTION PRN
Status: DISCONTINUED | OUTPATIENT
Start: 2025-01-04 | End: 2025-01-07 | Stop reason: HOSPADM

## 2025-01-04 RX ORDER — FAMOTIDINE 10 MG/ML
INJECTION, SOLUTION INTRAVENOUS
Status: DISCONTINUED | OUTPATIENT
Start: 2025-01-04 | End: 2025-01-04 | Stop reason: SDUPTHER

## 2025-01-04 RX ORDER — VANCOMYCIN HYDROCHLORIDE 1 G/20ML
INJECTION, POWDER, LYOPHILIZED, FOR SOLUTION INTRAVENOUS PRN
Status: DISCONTINUED | OUTPATIENT
Start: 2025-01-04 | End: 2025-01-04 | Stop reason: ALTCHOICE

## 2025-01-04 RX ORDER — DEXAMETHASONE SODIUM PHOSPHATE 4 MG/ML
INJECTION, SOLUTION INTRA-ARTICULAR; INTRALESIONAL; INTRAMUSCULAR; INTRAVENOUS; SOFT TISSUE
Status: DISCONTINUED | OUTPATIENT
Start: 2025-01-04 | End: 2025-01-04 | Stop reason: SDUPTHER

## 2025-01-04 RX ORDER — ACETAMINOPHEN 325 MG/1
650 TABLET ORAL EVERY 6 HOURS
Status: DISCONTINUED | OUTPATIENT
Start: 2025-01-04 | End: 2025-01-07 | Stop reason: HOSPADM

## 2025-01-04 RX ORDER — PROCHLORPERAZINE EDISYLATE 5 MG/ML
5 INJECTION INTRAMUSCULAR; INTRAVENOUS
Status: DISCONTINUED | OUTPATIENT
Start: 2025-01-04 | End: 2025-01-04 | Stop reason: HOSPADM

## 2025-01-04 RX ORDER — SODIUM CHLORIDE 9 MG/ML
INJECTION, SOLUTION INTRAVENOUS PRN
Status: DISCONTINUED | OUTPATIENT
Start: 2025-01-04 | End: 2025-01-04 | Stop reason: HOSPADM

## 2025-01-04 RX ORDER — CEFAZOLIN 2 G/1
INJECTION, POWDER, FOR SOLUTION INTRAMUSCULAR; INTRAVENOUS
Status: DISPENSED
Start: 2025-01-04 | End: 2025-01-04

## 2025-01-04 RX ORDER — SODIUM CHLORIDE 9 MG/ML
INJECTION, SOLUTION INTRAVENOUS PRN
Status: DISCONTINUED | OUTPATIENT
Start: 2025-01-04 | End: 2025-01-07 | Stop reason: HOSPADM

## 2025-01-04 RX ADMIN — DOCUSATE SODIUM 50 MG AND SENNOSIDES 8.6 MG 1 TABLET: 8.6; 5 TABLET, FILM COATED ORAL at 13:08

## 2025-01-04 RX ADMIN — WATER 2000 MG: 1 INJECTION INTRAMUSCULAR; INTRAVENOUS; SUBCUTANEOUS at 23:03

## 2025-01-04 RX ADMIN — OXYCODONE 5 MG: 5 TABLET ORAL at 18:57

## 2025-01-04 RX ADMIN — ONDANSETRON 4 MG: 2 INJECTION INTRAMUSCULAR; INTRAVENOUS at 08:05

## 2025-01-04 RX ADMIN — METHYLPREDNISOLONE SODIUM SUCCINATE 40 MG: 40 INJECTION INTRAMUSCULAR; INTRAVENOUS at 05:34

## 2025-01-04 RX ADMIN — ACETAMINOPHEN 650 MG: 325 TABLET ORAL at 23:02

## 2025-01-04 RX ADMIN — IPRATROPIUM BROMIDE AND ALBUTEROL SULFATE 1 DOSE: .5; 2.5 SOLUTION RESPIRATORY (INHALATION) at 07:43

## 2025-01-04 RX ADMIN — WATER 2000 MG: 1 INJECTION INTRAMUSCULAR; INTRAVENOUS; SUBCUTANEOUS at 07:48

## 2025-01-04 RX ADMIN — DIPHENHYDRAMINE HYDROCHLORIDE 25 MG: 50 INJECTION, SOLUTION INTRAMUSCULAR; INTRAVENOUS at 08:05

## 2025-01-04 RX ADMIN — ATORVASTATIN CALCIUM 40 MG: 40 TABLET, FILM COATED ORAL at 21:00

## 2025-01-04 RX ADMIN — SODIUM CHLORIDE, PRESERVATIVE FREE 10 ML: 5 INJECTION INTRAVENOUS at 21:00

## 2025-01-04 RX ADMIN — MORPHINE SULFATE 2 MG: 2 INJECTION, SOLUTION INTRAMUSCULAR; INTRAVENOUS at 04:37

## 2025-01-04 RX ADMIN — SODIUM CHLORIDE: 9 INJECTION, SOLUTION INTRAVENOUS at 07:45

## 2025-01-04 RX ADMIN — IPRATROPIUM BROMIDE AND ALBUTEROL SULFATE 1 DOSE: .5; 2.5 SOLUTION RESPIRATORY (INHALATION) at 13:14

## 2025-01-04 RX ADMIN — MORPHINE SULFATE 2 MG: 2 INJECTION, SOLUTION INTRAMUSCULAR; INTRAVENOUS at 21:24

## 2025-01-04 RX ADMIN — TRANEXAMIC ACID 1000 MG: 10 INJECTION, SOLUTION INTRAVENOUS at 09:57

## 2025-01-04 RX ADMIN — TRANEXAMIC ACID 1000 MG: 10 INJECTION, SOLUTION INTRAVENOUS at 08:04

## 2025-01-04 RX ADMIN — BUPIVACAINE HYDROCHLORIDE IN DEXTROSE 1.6 ML: 7.5 INJECTION, SOLUTION SUBARACHNOID at 08:08

## 2025-01-04 RX ADMIN — FENTANYL CITRATE 50 MCG: 50 INJECTION, SOLUTION INTRAMUSCULAR; INTRAVENOUS at 08:00

## 2025-01-04 RX ADMIN — SODIUM CHLORIDE, PRESERVATIVE FREE 10 ML: 5 INJECTION INTRAVENOUS at 23:07

## 2025-01-04 RX ADMIN — WATER 2000 MG: 1 INJECTION INTRAMUSCULAR; INTRAVENOUS; SUBCUTANEOUS at 15:01

## 2025-01-04 RX ADMIN — METHYLPREDNISOLONE SODIUM SUCCINATE 40 MG: 40 INJECTION INTRAMUSCULAR; INTRAVENOUS at 21:00

## 2025-01-04 RX ADMIN — DEXAMETHASONE SODIUM PHOSPHATE 4 MG: 4 INJECTION, SOLUTION INTRAMUSCULAR; INTRAVENOUS at 08:08

## 2025-01-04 RX ADMIN — ACETAMINOPHEN 650 MG: 325 TABLET ORAL at 13:08

## 2025-01-04 RX ADMIN — OXYCODONE 5 MG: 5 TABLET ORAL at 11:01

## 2025-01-04 RX ADMIN — ACETAMINOPHEN 650 MG: 325 TABLET ORAL at 18:57

## 2025-01-04 RX ADMIN — ASPIRIN 81 MG: 81 TABLET, COATED ORAL at 20:59

## 2025-01-04 RX ADMIN — IPRATROPIUM BROMIDE AND ALBUTEROL SULFATE 1 DOSE: 2.5; .5 SOLUTION RESPIRATORY (INHALATION) at 19:58

## 2025-01-04 RX ADMIN — PROPOFOL 100 MG: 10 INJECTION, EMULSION INTRAVENOUS at 08:00

## 2025-01-04 RX ADMIN — FAMOTIDINE 20 MG: 10 INJECTION, SOLUTION INTRAVENOUS at 07:53

## 2025-01-04 RX ADMIN — GABAPENTIN 300 MG: 300 CAPSULE ORAL at 21:00

## 2025-01-04 RX ADMIN — Medication 3 MG: at 23:02

## 2025-01-04 RX ADMIN — LIDOCAINE HYDROCHLORIDE 40 MG: 20 INJECTION, SOLUTION EPIDURAL; INFILTRATION; INTRACAUDAL; PERINEURAL at 08:00

## 2025-01-04 RX ADMIN — OXYCODONE 5 MG: 5 TABLET ORAL at 14:59

## 2025-01-04 RX ADMIN — PROPOFOL 120 MCG/KG/MIN: 10 INJECTION, EMULSION INTRAVENOUS at 08:04

## 2025-01-04 RX ADMIN — METHYLPREDNISOLONE SODIUM SUCCINATE 40 MG: 40 INJECTION INTRAMUSCULAR; INTRAVENOUS at 13:09

## 2025-01-04 RX ADMIN — DOCUSATE SODIUM 50 MG AND SENNOSIDES 8.6 MG 1 TABLET: 8.6; 5 TABLET, FILM COATED ORAL at 21:00

## 2025-01-04 ASSESSMENT — PAIN SCALES - GENERAL
PAINLEVEL_OUTOF10: 9
PAINLEVEL_OUTOF10: 6
PAINLEVEL_OUTOF10: 8
PAINLEVEL_OUTOF10: 8
PAINLEVEL_OUTOF10: 0
PAINLEVEL_OUTOF10: 10
PAINLEVEL_OUTOF10: 8
PAINLEVEL_OUTOF10: 0
PAINLEVEL_OUTOF10: 0
PAINLEVEL_OUTOF10: 6
PAINLEVEL_OUTOF10: 0
PAINLEVEL_OUTOF10: 6

## 2025-01-04 ASSESSMENT — PAIN DESCRIPTION - FREQUENCY: FREQUENCY: INTERMITTENT

## 2025-01-04 ASSESSMENT — PAIN DESCRIPTION - DESCRIPTORS
DESCRIPTORS: PRESSURE;SORE
DESCRIPTORS: ACHING;TENDER
DESCRIPTORS: ACHING
DESCRIPTORS: ACHING;TENDER;DISCOMFORT
DESCRIPTORS: ACHING;SORE
DESCRIPTORS: ACHING;SORE
DESCRIPTORS: DISCOMFORT;BURNING;SORE
DESCRIPTORS: ACHING;SORE

## 2025-01-04 ASSESSMENT — PAIN DESCRIPTION - PAIN TYPE: TYPE: SURGICAL PAIN

## 2025-01-04 ASSESSMENT — PAIN DESCRIPTION - LOCATION
LOCATION: HIP
LOCATION: HIP;LEG

## 2025-01-04 ASSESSMENT — PAIN - FUNCTIONAL ASSESSMENT
PAIN_FUNCTIONAL_ASSESSMENT: PREVENTS OR INTERFERES SOME ACTIVE ACTIVITIES AND ADLS

## 2025-01-04 ASSESSMENT — PAIN DESCRIPTION - ORIENTATION
ORIENTATION: RIGHT

## 2025-01-04 ASSESSMENT — PAIN DESCRIPTION - ONSET: ONSET: ON-GOING

## 2025-01-04 ASSESSMENT — LIFESTYLE VARIABLES: SMOKING_STATUS: 1

## 2025-01-04 NOTE — ADDENDUM NOTE
Addendum  created 01/04/25 1240 by Deedee Rousseau APRN - CRNA    Intraprocedure Meds edited, Orders acknowledged in Narrator

## 2025-01-04 NOTE — ANESTHESIA PRE PROCEDURE
11/16/2022 12:02 PM       POC Tests: No results for input(s): \"POCGLU\", \"POCNA\", \"POCK\", \"POCCL\", \"POCBUN\", \"POCHEMO\", \"POCHCT\" in the last 72 hours.    Coags: No results found for: \"PROTIME\", \"INR\", \"APTT\"    HCG (If Applicable): No results found for: \"PREGTESTUR\", \"PREGSERUM\", \"HCG\", \"HCGQUANT\"     ABGs: No results found for: \"PHART\", \"PO2ART\", \"KDA3QCI\", \"BJT0EJJ\", \"BEART\", \"W6NYYGUX\"     Type & Screen (If Applicable):  No results found for: \"ABORH\", \"LABANTI\"    Drug/Infectious Status (If Applicable):  No results found for: \"HIV\", \"HEPCAB\"    COVID-19 Screening (If Applicable): No results found for: \"COVID19\"        Anesthesia Evaluation  Patient summary reviewed and Nursing notes reviewed   no history of anesthetic complications:   Airway: Mallampati: II  TM distance: >3 FB   Neck ROM: full  Mouth opening: > = 3 FB   Dental:    (+) edentulous      Pulmonary: breath sounds clear to auscultation  (+)  COPD:          asthma: current smoker                           Cardiovascular:  Exercise tolerance: good (>4 METS)  (+) hyperlipidemia        Rhythm: regular  Rate: normal                    Neuro/Psych:   (+) depression/anxiety             GI/Hepatic/Renal: Neg GI/Hepatic/Renal ROS            Endo/Other:    (+) Diabetes.                 Abdominal:             Vascular:           ROS comment: PAD (peripheral artery disease) . Other Findings:             Anesthesia Plan      spinal     ASA 2     (General anesthesia as back up)      MIPS: Postoperative opioids intended and Prophylactic antiemetics administered.  Anesthetic plan and risks discussed with patient.    Use of blood products discussed with patient whom consented to blood products.    Plan discussed with CRNA.                    Joanne Vyas MD   1/4/2025

## 2025-01-04 NOTE — ANESTHESIA PROCEDURE NOTES
Spinal Block    Patient location during procedure: OR  Reason for block: primary anesthetic and at surgeon's request  Staffing  Performed: anesthesiologist   Anesthesiologist: Joanne Vyas MD  Performed by: Joanne Vyas MD  Authorized by: Joanne Vyas MD    Spinal Block  Patient position: right lateral decubitus  Prep: ChloraPrep  Patient monitoring: cardiac monitor, continuous pulse ox, continuous capnometry, frequent blood pressure checks and oxygen  Approach: midline  Location: L3/L4  Provider prep: mask and sterile gloves  Local infiltration: lidocaine  Needle  Needle type: Pencan   Needle gauge: 24 G  Needle length: 3.5 in  Assessment  Sensory level: T6  Swirl obtained: Yes  CSF: clear  Attempts: 1  Hemodynamics: stable  Preanesthetic Checklist  Completed: patient identified, IV checked, site marked, risks and benefits discussed, surgical/procedural consents, equipment checked, pre-op evaluation, timeout performed, anesthesia consent given, oxygen available, monitors applied/VS acknowledged, fire risk safety assessment completed and verbalized and blood product R/B/A discussed and consented

## 2025-01-04 NOTE — ANESTHESIA POSTPROCEDURE EVALUATION
Department of Anesthesiology  Postprocedure Note    Patient: Krista Johnson  MRN: [id number]  YOB: 1955  Date of evaluation: 1/4/2025    Procedure Summary       Date: 01/04/25 Room / Location: [address]    Anesthesia Start: 0745 Anesthesia Stop:     Procedure: RIGHT TOTAL HIP ARTHROPLASTY (Right: Hip) Diagnosis:       Closed fracture of right femur, unspecified fracture morphology, unspecified portion of femur, initial encounter ([facility])      (Closed fracture of right femur, unspecified fracture morphology, unspecified portion of femur, initial encounter ([facility]) [S72.91XA])    Surgeons: Chepe Rothman MD Responsible Provider: Joanne Vyas MD    Anesthesia Type: spinal ASA Status: 2            Anesthesia Type: No value filed.    David Phase I: David Score: 10    David Phase II:      Anesthesia Post Evaluation    Patient location during evaluation: PACU  Patient participation: complete - patient participated  Level of consciousness: awake  Airway patency: patent  Nausea & Vomiting: no nausea and no vomiting  Cardiovascular status: hemodynamically stable  Respiratory status: acceptable, nonlabored ventilation and spontaneous ventilation  Hydration status: euvolemic  Pain management: adequate        No notable events documented.

## 2025-01-04 NOTE — BRIEF OP NOTE
Brief Postoperative Note      Patient: Krista Johnson  YOB: 1955  MRN: [id number]    Date of Procedure: 1/4/2025    Pre-Op Diagnosis Codes:      * Closed fracture of right femur, unspecified fracture morphology, unspecified portion of femur, initial encounter ([facility]) [S72.91XA]    Post-Op Diagnosis: Same       Procedure(s):  RIGHT TOTAL HIP ARTHROPLASTY    Surgeon(s):  Chepe Rothman MD    Assistant:  Surgical Assistant: Noah Blum  Physician Assistant: Leanna Sawant PA-C    Anesthesia: Spinal    Estimated Blood Loss (mL): 150    Complications: None    Specimens:   ID Type Source Tests Collected by Time Destination   A : right hip bone Bone Bone SURGICAL PATHOLOGY Chepe Rothman MD 1/4/2025 0837        Implants:  Implant Name Type Inv. Item Serial No.  Lot No. LRB No. Used Action   G7 PPS LTD ACET SHELL 52E - [id number]  G7 PPS LTD ACET SHELL 52E  [employer]- [id number] Right 1 Implanted   G7 DUAL MOBILITY LINER 42MM E - [id number]  G7 DUAL MOBILITY LINER 42MM E  [employer]- [id number] Right 1 Implanted   VIVACIT-E DM BEARING 52K30OH - [id number]  VIVACIT-E DM BEARING [id number]  [employer]-WD [id number] Right 1 Implanted   STEM FEM STD OFFSET 6 HIP CLLRD HA AVENIR COMPLETE - [id number]  STEM FEM STD OFFSET 6 HIP CLLRD HA AVENIR COMPLETE  [employer]- [id number] Right 1 Implanted   HEAD FEM 0+ MM 12/14 28 MM HIP TAPR BIOLOX DELT OPT - [id number]  HEAD FEM 0+ MM 12/14 28 MM HIP TAPR BIOLOX DELT OPT  [employer]- [id number] Right 1 Implanted         Drains:   External Urinary Catheter (Active)   Site Assessment Clean,dry & intact 01/03/25 2244   Placement Initiated 01/02/25 2200   Catheter Care Catheter/Wick replaced;Suction Canister/Tubing changed 01/02/25 2200   Perineal Care Yes 01/02/25 2200   Suction 40 mmgHg continuous 01/02/25 2200   Urine Color Yellow 01/02/25 2200   Urine Appearance Clear 01/02/25 2200   Output

## 2025-01-04 NOTE — OP NOTE
time-out, accessed right hip through a posterolateral incision. Skin was incised sharply through adipose tissue.  Fascia was split in line with the incision.  Charnley retractor was placed.  The posterior capsule was released with Bovie electrocautery and tagged with #1 Ethibond.  The femoral neck was then rotated up into the wound.  There was a complete transverse fracture through the subcapital region.  I then removed the central portion of the femoral neck.  The femur was mobilized anteriorly.  I extracted the femoral head in 1 piece.  The labrum and the pulvinar were then excised.  Given her relatively young age, I opted for total hip replacement.  I then sequentially reamed the socket into a hemisphere up to size 51 mm.  Size 52 mm G7 shell was impacted in about 40-45 degrees of vertical opening and approximately 20 degrees of anteversion.  There was full seating of the cup with a firm press-fit.  The 42 mm metal liner was then inserted with an intact taper.  Then, turned our attention to the femur, gained access to the canal with an awl, used a tapered reamer and rasped for appropriate lateralization. I broached the hip in native anteversion up to a size 6 at which time, we had good rotational stability of the broach.  Trial reduction with a standard offset neck produced a stable hip in all planes with no evidence of bony or component impingement.  Leg lengths were restored.  Trials were removed.  The definitive femoral stem was seated.  Final trial reduction confirmed the 28 standard head.  The dual mobility components were mated on the back table.  I then impacted the construct over a clean and dried trunnion.  The hip was reduced with stability confirmed in all planes.  I then lavaged the hip with dilute 500 mL of Betadine followed by saline. Pericapsular injection was given with a cocktail of medications.  1 g of vancomycin powder was placed in the joint.  Posterior capsular repair was completed with #1

## 2025-01-05 LAB
ANION GAP SERPL CALCULATED.3IONS-SCNC: 12 MMOL/L (ref 7–16)
BASOPHILS # BLD: 0.01 K/UL (ref 0–0.2)
BASOPHILS NFR BLD: 0 % (ref 0–2)
BUN SERPL-MCNC: 9 MG/DL (ref 6–23)
CALCIUM SERPL-MCNC: 8.9 MG/DL (ref 8.6–10.2)
CHLORIDE SERPL-SCNC: 100 MMOL/L (ref 98–107)
CO2 SERPL-SCNC: 24 MMOL/L (ref 22–29)
CREAT SERPL-MCNC: 0.4 MG/DL (ref 0.5–1)
EOSINOPHIL # BLD: 0.01 K/UL (ref 0.05–0.5)
EOSINOPHILS RELATIVE PERCENT: 0 % (ref 0–6)
ERYTHROCYTE [DISTWIDTH] IN BLOOD BY AUTOMATED COUNT: 13.7 % (ref 11.5–15)
GFR, ESTIMATED: >90 ML/MIN/1.73M2
GLUCOSE SERPL-MCNC: 301 MG/DL (ref 74–99)
HBA1C MFR BLD: 6.3 % (ref 4–5.6)
HCT VFR BLD AUTO: 41 % (ref 34–48)
HGB BLD-MCNC: 13.6 G/DL (ref 11.5–15.5)
IMM GRANULOCYTES # BLD AUTO: 0.07 K/UL (ref 0–0.58)
IMM GRANULOCYTES NFR BLD: 0 % (ref 0–5)
LYMPHOCYTES NFR BLD: 0.86 K/UL (ref 1.5–4)
LYMPHOCYTES RELATIVE PERCENT: 5 % (ref 20–42)
MCH RBC QN AUTO: 30.2 PG (ref 26–35)
MCHC RBC AUTO-ENTMCNC: 33.2 G/DL (ref 32–34.5)
MCV RBC AUTO: 90.9 FL (ref 80–99.9)
MONOCYTES NFR BLD: 0.56 K/UL (ref 0.1–0.95)
MONOCYTES NFR BLD: 3 % (ref 2–12)
NEUTROPHILS NFR BLD: 92 % (ref 43–80)
NEUTS SEG NFR BLD: 16.54 K/UL (ref 1.8–7.3)
PLATELET # BLD AUTO: 369 K/UL (ref 130–450)
PMV BLD AUTO: 10 FL (ref 7–12)
POTASSIUM SERPL-SCNC: 4.1 MMOL/L (ref 3.5–5)
RBC # BLD AUTO: 4.51 M/UL (ref 3.5–5.5)
SODIUM SERPL-SCNC: 136 MMOL/L (ref 132–146)
WBC OTHER # BLD: 18.1 K/UL (ref 4.5–11.5)

## 2025-01-05 PROCEDURE — 6370000000 HC RX 637 (ALT 250 FOR IP): Performed by: INTERNAL MEDICINE

## 2025-01-05 PROCEDURE — 6370000000 HC RX 637 (ALT 250 FOR IP): Performed by: ORTHOPAEDIC SURGERY

## 2025-01-05 PROCEDURE — 80048 BASIC METABOLIC PNL TOTAL CA: CPT

## 2025-01-05 PROCEDURE — 97530 THERAPEUTIC ACTIVITIES: CPT

## 2025-01-05 PROCEDURE — 85025 COMPLETE CBC W/AUTO DIFF WBC: CPT

## 2025-01-05 PROCEDURE — 83036 HEMOGLOBIN GLYCOSYLATED A1C: CPT

## 2025-01-05 PROCEDURE — 6360000002 HC RX W HCPCS: Performed by: ORTHOPAEDIC SURGERY

## 2025-01-05 PROCEDURE — 94640 AIRWAY INHALATION TREATMENT: CPT

## 2025-01-05 PROCEDURE — 1200000000 HC SEMI PRIVATE

## 2025-01-05 PROCEDURE — 2500000003 HC RX 250 WO HCPCS: Performed by: ORTHOPAEDIC SURGERY

## 2025-01-05 RX ADMIN — DOCUSATE SODIUM 50 MG AND SENNOSIDES 8.6 MG 1 TABLET: 8.6; 5 TABLET, FILM COATED ORAL at 09:09

## 2025-01-05 RX ADMIN — SODIUM CHLORIDE, PRESERVATIVE FREE 10 ML: 5 INJECTION INTRAVENOUS at 20:39

## 2025-01-05 RX ADMIN — GABAPENTIN 300 MG: 300 CAPSULE ORAL at 20:39

## 2025-01-05 RX ADMIN — FAMOTIDINE 20 MG: 20 TABLET ORAL at 09:09

## 2025-01-05 RX ADMIN — CYCLOBENZAPRINE HYDROCHLORIDE 10 MG: 10 TABLET, FILM COATED ORAL at 16:01

## 2025-01-05 RX ADMIN — OXYCODONE 5 MG: 5 TABLET ORAL at 01:02

## 2025-01-05 RX ADMIN — SODIUM CHLORIDE, PRESERVATIVE FREE 10 ML: 5 INJECTION INTRAVENOUS at 09:12

## 2025-01-05 RX ADMIN — MORPHINE SULFATE 2 MG: 2 INJECTION, SOLUTION INTRAMUSCULAR; INTRAVENOUS at 17:57

## 2025-01-05 RX ADMIN — IPRATROPIUM BROMIDE AND ALBUTEROL SULFATE 1 DOSE: 2.5; .5 SOLUTION RESPIRATORY (INHALATION) at 10:00

## 2025-01-05 RX ADMIN — ACETAMINOPHEN 650 MG: 325 TABLET ORAL at 23:45

## 2025-01-05 RX ADMIN — ACETAMINOPHEN 650 MG: 325 TABLET ORAL at 11:37

## 2025-01-05 RX ADMIN — DOCUSATE SODIUM 50 MG AND SENNOSIDES 8.6 MG 1 TABLET: 8.6; 5 TABLET, FILM COATED ORAL at 20:39

## 2025-01-05 RX ADMIN — ACETAMINOPHEN 650 MG: 325 TABLET ORAL at 05:16

## 2025-01-05 RX ADMIN — OXYCODONE 5 MG: 5 TABLET ORAL at 09:12

## 2025-01-05 RX ADMIN — OXYCODONE 5 MG: 5 TABLET ORAL at 21:09

## 2025-01-05 RX ADMIN — OXYCODONE 5 MG: 5 TABLET ORAL at 13:14

## 2025-01-05 RX ADMIN — ACETAMINOPHEN 650 MG: 325 TABLET ORAL at 17:08

## 2025-01-05 RX ADMIN — OXYCODONE 5 MG: 5 TABLET ORAL at 17:07

## 2025-01-05 RX ADMIN — Medication 3 MG: at 23:45

## 2025-01-05 RX ADMIN — OXYCODONE 5 MG: 5 TABLET ORAL at 05:17

## 2025-01-05 RX ADMIN — IPRATROPIUM BROMIDE AND ALBUTEROL SULFATE 1 DOSE: 2.5; .5 SOLUTION RESPIRATORY (INHALATION) at 13:51

## 2025-01-05 RX ADMIN — PREDNISONE 30 MG: 20 TABLET ORAL at 09:08

## 2025-01-05 RX ADMIN — ATORVASTATIN CALCIUM 40 MG: 40 TABLET, FILM COATED ORAL at 20:39

## 2025-01-05 RX ADMIN — IPRATROPIUM BROMIDE AND ALBUTEROL SULFATE 1 DOSE: 2.5; .5 SOLUTION RESPIRATORY (INHALATION) at 20:22

## 2025-01-05 RX ADMIN — GABAPENTIN 300 MG: 300 CAPSULE ORAL at 09:09

## 2025-01-05 RX ADMIN — ASPIRIN 81 MG: 81 TABLET, COATED ORAL at 09:08

## 2025-01-05 RX ADMIN — ASPIRIN 81 MG: 81 TABLET, COATED ORAL at 20:39

## 2025-01-05 ASSESSMENT — PAIN DESCRIPTION - LOCATION
LOCATION: HIP

## 2025-01-05 ASSESSMENT — PAIN DESCRIPTION - ONSET: ONSET: ON-GOING

## 2025-01-05 ASSESSMENT — PAIN DESCRIPTION - DESCRIPTORS
DESCRIPTORS: SHARP;STABBING
DESCRIPTORS: DISCOMFORT
DESCRIPTORS: ACHING
DESCRIPTORS: ACHING;DISCOMFORT
DESCRIPTORS: ACHING
DESCRIPTORS: ACHING;DISCOMFORT;SORE
DESCRIPTORS: ACHING;DISCOMFORT
DESCRIPTORS: ACHING;DISCOMFORT
DESCRIPTORS: ACHING;SORE
DESCRIPTORS: ACHING;DISCOMFORT

## 2025-01-05 ASSESSMENT — PAIN SCALES - GENERAL
PAINLEVEL_OUTOF10: 6
PAINLEVEL_OUTOF10: 8
PAINLEVEL_OUTOF10: 9
PAINLEVEL_OUTOF10: 0
PAINLEVEL_OUTOF10: 6
PAINLEVEL_OUTOF10: 0
PAINLEVEL_OUTOF10: 8
PAINLEVEL_OUTOF10: 9
PAINLEVEL_OUTOF10: 6
PAINLEVEL_OUTOF10: 8
PAINLEVEL_OUTOF10: 0
PAINLEVEL_OUTOF10: 10
PAINLEVEL_OUTOF10: 9
PAINLEVEL_OUTOF10: 6
PAINLEVEL_OUTOF10: 9
PAINLEVEL_OUTOF10: 4
PAINLEVEL_OUTOF10: 9
PAINLEVEL_OUTOF10: 9
PAINLEVEL_OUTOF10: 6

## 2025-01-05 ASSESSMENT — PAIN DESCRIPTION - ORIENTATION
ORIENTATION: RIGHT

## 2025-01-05 ASSESSMENT — PAIN DESCRIPTION - PAIN TYPE
TYPE: SURGICAL PAIN
TYPE: SURGICAL PAIN

## 2025-01-05 ASSESSMENT — PAIN DESCRIPTION - FREQUENCY: FREQUENCY: INTERMITTENT

## 2025-01-05 NOTE — PLAN OF CARE
Problem: Discharge Planning  Goal: Discharge to home or other facility with appropriate resources  1/4/2025 2153 by Fatoumata Medellin RN  Outcome: Progressing  Flowsheets (Taken 1/4/2025 2150)  Discharge to home or other facility with appropriate resources: Identify barriers to discharge with patient and caregiver  1/4/2025 1801 by Sabi Taveras RN  Outcome: Progressing     Problem: Chronic Conditions and Co-morbidities  Goal: Patient's chronic conditions and co-morbidity symptoms are monitored and maintained or improved  1/4/2025 2153 by Fatoumata Medellin RN  Outcome: Progressing  Flowsheets (Taken 1/4/2025 2150)  Care Plan - Patient's Chronic Conditions and Co-Morbidity Symptoms are Monitored and Maintained or Improved: Monitor and assess patient's chronic conditions and comorbid symptoms for stability, deterioration, or improvement  1/4/2025 1801 by Sabi Taveras RN  Outcome: Progressing     Problem: ABCDS Injury Assessment  Goal: Absence of physical injury  1/4/2025 2153 by Fatoumata Medellin RN  Outcome: Progressing  1/4/2025 1801 by Sabi Taveras RN  Outcome: Progressing     Problem: Safety - Adult  Goal: Free from fall injury  1/4/2025 2153 by Fatoumata Medellin RN  Outcome: Progressing  1/4/2025 1801 by Sabi Taveras RN  Outcome: Progressing     Problem: Skin/Tissue Integrity  Goal: Absence of new skin breakdown  Description: 1.  Monitor for areas of redness and/or skin breakdown  2.  Assess vascular access sites hourly  3.  Every 4-6 hours minimum:  Change oxygen saturation probe site  4.  Every 4-6 hours:  If on nasal continuous positive airway pressure, respiratory therapy assess nares and determine need for appliance change or resting period.  1/4/2025 2153 by Fatoumata Medellin RN  Outcome: Progressing  1/4/2025 1801 by Sabi Taveras RN  Outcome: Progressing     Problem: Pain  Goal: Verbalizes/displays adequate comfort level or baseline comfort level  Outcome:

## 2025-01-05 NOTE — DISCHARGE INSTRUCTIONS
Discharge Instructions for Hip Replacement - Dr. Rothman    WBAT RIGHT LE with walker.  Must stay on walker  Posterior Hip Precautions sit in straight-backed chair,-okay to lay on nonoperative side with pillow between knees.  Aspirin twice daily for postoperative DVT prophylaxis x 30 days  May shower daily  Remove hip bandage on postoperative day #7-next Saturday  HASRH hose x 2 weeks, off at night  Ice packs to hip for 30 minutes every 3-4 hours  Pillows between knees while in bed and for sleeping  Home health care after [facility] discharge  Follow up office visit in 2 wks - [phone], Ext [phone] ([facility])

## 2025-01-05 NOTE — PLAN OF CARE
Problem: Discharge Planning  Goal: Discharge to home or other facility with appropriate resources  1/5/2025 1005 by Mai Bolden, RN  Outcome: Progressing     Problem: Chronic Conditions and Co-morbidities  Goal: Patient's chronic conditions and co-morbidity symptoms are monitored and maintained or improved  1/5/2025 1005 by Mai Bolden, RN  Outcome: Progressing     Problem: ABCDS Injury Assessment  Goal: Absence of physical injury  1/5/2025 1005 by Mai Bolden, RN  Outcome: Progressing     Problem: Safety - Adult  Goal: Free from fall injury  1/5/2025 1005 by Mai Bolden, RN  Outcome: Progressing     Problem: Skin/Tissue Integrity  Goal: Absence of new skin breakdown  Description: 1.  Monitor for areas of redness and/or skin breakdown  2.  Assess vascular access sites hourly  3.  Every 4-6 hours minimum:  Change oxygen saturation probe site  4.  Every 4-6 hours:  If on nasal continuous positive airway pressure, respiratory therapy assess nares and determine need for appliance change or resting period.  1/5/2025 1005 by Mai Bolden, RN  Outcome: Progressing     Problem: Pain  Goal: Verbalizes/displays adequate comfort level or baseline comfort level  1/5/2025 1005 by Mai Bolden, RN  Outcome: Progressing

## 2025-01-06 LAB
ANION GAP SERPL CALCULATED.3IONS-SCNC: 10 MMOL/L (ref 7–16)
BASOPHILS # BLD: 0.02 K/UL (ref 0–0.2)
BASOPHILS NFR BLD: 0 % (ref 0–2)
BUN SERPL-MCNC: 12 MG/DL (ref 6–23)
CALCIUM SERPL-MCNC: 8.6 MG/DL (ref 8.6–10.2)
CHLORIDE SERPL-SCNC: 101 MMOL/L (ref 98–107)
CO2 SERPL-SCNC: 26 MMOL/L (ref 22–29)
CREAT SERPL-MCNC: 0.4 MG/DL (ref 0.5–1)
EOSINOPHIL # BLD: 0 K/UL (ref 0.05–0.5)
EOSINOPHILS RELATIVE PERCENT: 0 % (ref 0–6)
ERYTHROCYTE [DISTWIDTH] IN BLOOD BY AUTOMATED COUNT: 14 % (ref 11.5–15)
GFR, ESTIMATED: >90 ML/MIN/1.73M2
GLUCOSE SERPL-MCNC: 180 MG/DL (ref 74–99)
HCT VFR BLD AUTO: 41.8 % (ref 34–48)
HGB BLD-MCNC: 13.4 G/DL (ref 11.5–15.5)
IMM GRANULOCYTES # BLD AUTO: 0.15 K/UL (ref 0–0.58)
IMM GRANULOCYTES NFR BLD: 1 % (ref 0–5)
LYMPHOCYTES NFR BLD: 2.41 K/UL (ref 1.5–4)
LYMPHOCYTES RELATIVE PERCENT: 17 % (ref 20–42)
MAGNESIUM SERPL-MCNC: 2.1 MG/DL (ref 1.6–2.6)
MCH RBC QN AUTO: 29.9 PG (ref 26–35)
MCHC RBC AUTO-ENTMCNC: 32.1 G/DL (ref 32–34.5)
MCV RBC AUTO: 93.3 FL (ref 80–99.9)
MONOCYTES NFR BLD: 0.99 K/UL (ref 0.1–0.95)
MONOCYTES NFR BLD: 7 % (ref 2–12)
NEUTROPHILS NFR BLD: 76 % (ref 43–80)
NEUTS SEG NFR BLD: 10.99 K/UL (ref 1.8–7.3)
PLATELET # BLD AUTO: 330 K/UL (ref 130–450)
PMV BLD AUTO: 10.3 FL (ref 7–12)
POTASSIUM SERPL-SCNC: 3.5 MMOL/L (ref 3.5–5)
RBC # BLD AUTO: 4.48 M/UL (ref 3.5–5.5)
SODIUM SERPL-SCNC: 137 MMOL/L (ref 132–146)
WBC OTHER # BLD: 14.6 K/UL (ref 4.5–11.5)

## 2025-01-06 PROCEDURE — 83735 ASSAY OF MAGNESIUM: CPT

## 2025-01-06 PROCEDURE — 6370000000 HC RX 637 (ALT 250 FOR IP): Performed by: INTERNAL MEDICINE

## 2025-01-06 PROCEDURE — 2500000003 HC RX 250 WO HCPCS: Performed by: ORTHOPAEDIC SURGERY

## 2025-01-06 PROCEDURE — 97530 THERAPEUTIC ACTIVITIES: CPT

## 2025-01-06 PROCEDURE — 94640 AIRWAY INHALATION TREATMENT: CPT

## 2025-01-06 PROCEDURE — 6370000000 HC RX 637 (ALT 250 FOR IP): Performed by: ORTHOPAEDIC SURGERY

## 2025-01-06 PROCEDURE — 85025 COMPLETE CBC W/AUTO DIFF WBC: CPT

## 2025-01-06 PROCEDURE — 80048 BASIC METABOLIC PNL TOTAL CA: CPT

## 2025-01-06 PROCEDURE — 36415 COLL VENOUS BLD VENIPUNCTURE: CPT

## 2025-01-06 PROCEDURE — 1200000000 HC SEMI PRIVATE

## 2025-01-06 RX ORDER — UMECLIDINIUM BROMIDE AND VILANTEROL TRIFENATATE 62.5; 25 UG/1; UG/1
1 POWDER RESPIRATORY (INHALATION) DAILY
Qty: 1 EACH | Refills: 1 | Status: SHIPPED | OUTPATIENT
Start: 2025-01-06

## 2025-01-06 RX ORDER — CYCLOBENZAPRINE HCL 10 MG
10 TABLET ORAL EVERY 12 HOURS PRN
Qty: 20 TABLET | Refills: 0 | Status: SHIPPED | OUTPATIENT
Start: 2025-01-06 | End: 2025-01-16

## 2025-01-06 RX ORDER — SENNOSIDES A AND B 8.6 MG/1
1 TABLET, FILM COATED ORAL 2 TIMES DAILY PRN
Qty: 14 TABLET | Refills: 0 | Status: SHIPPED | OUTPATIENT
Start: 2025-01-06 | End: 2025-01-13

## 2025-01-06 RX ORDER — PREDNISONE 10 MG/1
TABLET ORAL
Qty: 6 TABLET | Refills: 0 | Status: SHIPPED | OUTPATIENT
Start: 2025-01-06

## 2025-01-06 RX ORDER — ASPIRIN 81 MG/1
81 TABLET ORAL 2 TIMES DAILY
Qty: 30 TABLET | Refills: 0 | Status: SHIPPED | OUTPATIENT
Start: 2025-01-06 | End: 2025-02-03

## 2025-01-06 RX ORDER — OXYCODONE HYDROCHLORIDE 5 MG/1
5 TABLET ORAL EVERY 6 HOURS PRN
Qty: 20 TABLET | Refills: 0 | Status: SHIPPED | OUTPATIENT
Start: 2025-01-06 | End: 2025-01-11

## 2025-01-06 RX ADMIN — ASPIRIN 81 MG: 81 TABLET, COATED ORAL at 09:16

## 2025-01-06 RX ADMIN — OXYCODONE 5 MG: 5 TABLET ORAL at 16:14

## 2025-01-06 RX ADMIN — OXYCODONE 5 MG: 5 TABLET ORAL at 20:22

## 2025-01-06 RX ADMIN — SODIUM CHLORIDE, PRESERVATIVE FREE 10 ML: 5 INJECTION INTRAVENOUS at 20:23

## 2025-01-06 RX ADMIN — PREDNISONE 30 MG: 20 TABLET ORAL at 09:16

## 2025-01-06 RX ADMIN — DOCUSATE SODIUM 50 MG AND SENNOSIDES 8.6 MG 1 TABLET: 8.6; 5 TABLET, FILM COATED ORAL at 09:15

## 2025-01-06 RX ADMIN — SODIUM CHLORIDE, PRESERVATIVE FREE 10 ML: 5 INJECTION INTRAVENOUS at 09:17

## 2025-01-06 RX ADMIN — ASPIRIN 81 MG: 81 TABLET, COATED ORAL at 20:22

## 2025-01-06 RX ADMIN — IPRATROPIUM BROMIDE AND ALBUTEROL SULFATE 1 DOSE: 2.5; .5 SOLUTION RESPIRATORY (INHALATION) at 12:33

## 2025-01-06 RX ADMIN — IPRATROPIUM BROMIDE AND ALBUTEROL SULFATE 1 DOSE: 2.5; .5 SOLUTION RESPIRATORY (INHALATION) at 20:03

## 2025-01-06 RX ADMIN — GABAPENTIN 300 MG: 300 CAPSULE ORAL at 09:15

## 2025-01-06 RX ADMIN — DOCUSATE SODIUM 50 MG AND SENNOSIDES 8.6 MG 1 TABLET: 8.6; 5 TABLET, FILM COATED ORAL at 20:22

## 2025-01-06 RX ADMIN — FAMOTIDINE 20 MG: 20 TABLET ORAL at 09:17

## 2025-01-06 RX ADMIN — IPRATROPIUM BROMIDE AND ALBUTEROL SULFATE 1 DOSE: 2.5; .5 SOLUTION RESPIRATORY (INHALATION) at 08:54

## 2025-01-06 RX ADMIN — ACETAMINOPHEN 650 MG: 325 TABLET ORAL at 16:17

## 2025-01-06 RX ADMIN — ACETAMINOPHEN 650 MG: 325 TABLET ORAL at 12:22

## 2025-01-06 RX ADMIN — ACETAMINOPHEN 650 MG: 325 TABLET ORAL at 22:45

## 2025-01-06 RX ADMIN — ATORVASTATIN CALCIUM 40 MG: 40 TABLET, FILM COATED ORAL at 20:22

## 2025-01-06 RX ADMIN — GABAPENTIN 300 MG: 300 CAPSULE ORAL at 20:22

## 2025-01-06 RX ADMIN — Medication 3 MG: at 22:48

## 2025-01-06 RX ADMIN — ACETAMINOPHEN 650 MG: 325 TABLET ORAL at 06:09

## 2025-01-06 RX ADMIN — OXYCODONE 5 MG: 5 TABLET ORAL at 04:16

## 2025-01-06 RX ADMIN — OXYCODONE 5 MG: 5 TABLET ORAL at 09:28

## 2025-01-06 ASSESSMENT — PAIN DESCRIPTION - LOCATION
LOCATION: HIP

## 2025-01-06 ASSESSMENT — PAIN SCALES - GENERAL
PAINLEVEL_OUTOF10: 4
PAINLEVEL_OUTOF10: 9
PAINLEVEL_OUTOF10: 7
PAINLEVEL_OUTOF10: 9
PAINLEVEL_OUTOF10: 4
PAINLEVEL_OUTOF10: 4
PAINLEVEL_OUTOF10: 0
PAINLEVEL_OUTOF10: 8
PAINLEVEL_OUTOF10: 8
PAINLEVEL_OUTOF10: 0
PAINLEVEL_OUTOF10: 0
PAINLEVEL_OUTOF10: 9

## 2025-01-06 ASSESSMENT — PAIN - FUNCTIONAL ASSESSMENT
PAIN_FUNCTIONAL_ASSESSMENT: PREVENTS OR INTERFERES SOME ACTIVE ACTIVITIES AND ADLS
PAIN_FUNCTIONAL_ASSESSMENT: PREVENTS OR INTERFERES SOME ACTIVE ACTIVITIES AND ADLS

## 2025-01-06 ASSESSMENT — PAIN DESCRIPTION - DESCRIPTORS
DESCRIPTORS: ACHING
DESCRIPTORS: ACHING
DESCRIPTORS: ACHING;DISCOMFORT;SORE
DESCRIPTORS: DISCOMFORT
DESCRIPTORS: ACHING;DISCOMFORT;SORE
DESCRIPTORS: DISCOMFORT

## 2025-01-06 ASSESSMENT — PAIN DESCRIPTION - ORIENTATION
ORIENTATION: RIGHT

## 2025-01-06 NOTE — PLAN OF CARE
Problem: Discharge Planning  Goal: Discharge to home or other facility with appropriate resources  Outcome: Progressing     Problem: Chronic Conditions and Co-morbidities  Goal: Patient's chronic conditions and co-morbidity symptoms are monitored and maintained or improved  Outcome: Progressing  Flowsheets (Taken 1/5/2025 2200)  Care Plan - Patient's Chronic Conditions and Co-Morbidity Symptoms are Monitored and Maintained or Improved: Monitor and assess patient's chronic conditions and comorbid symptoms for stability, deterioration, or improvement     Problem: ABCDS Injury Assessment  Goal: Absence of physical injury  Outcome: Progressing     Problem: Safety - Adult  Goal: Free from fall injury  Outcome: Progressing     Problem: Skin/Tissue Integrity  Goal: Absence of new skin breakdown  Description: 1.  Monitor for areas of redness and/or skin breakdown  2.  Assess vascular access sites hourly  3.  Every 4-6 hours minimum:  Change oxygen saturation probe site  4.  Every 4-6 hours:  If on nasal continuous positive airway pressure, respiratory therapy assess nares and determine need for appliance change or resting period.  Outcome: Progressing     Problem: Pain  Goal: Verbalizes/displays adequate comfort level or baseline comfort level  Outcome: Progressing

## 2025-01-06 NOTE — PLAN OF CARE
Problem: Discharge Planning  Goal: Discharge to home or other facility with appropriate resources  1/6/2025 0924 by Carlton Ruiz RN  Outcome: Progressing     Problem: Chronic Conditions and Co-morbidities  Goal: Patient's chronic conditions and co-morbidity symptoms are monitored and maintained or improved  1/6/2025 0924 by Carlton Ruiz RN  Outcome: Progressing     Problem: ABCDS Injury Assessment  Goal: Absence of physical injury  1/6/2025 0924 by Carlton Ruiz RN  Outcome: Progressing     Problem: Safety - Adult  Goal: Free from fall injury  1/6/2025 0924 by Carlton Ruiz RN  Outcome: Progressing     Problem: Skin/Tissue Integrity  Goal: Absence of new skin breakdown  Description: 1.  Monitor for areas of redness and/or skin breakdown  2.  Assess vascular access sites hourly  3.  Every 4-6 hours minimum:  Change oxygen saturation probe site  4.  Every 4-6 hours:  If on nasal continuous positive airway pressure, respiratory therapy assess nares and determine need for appliance change or resting period.  1/6/2025 0924 by Carlton Ruiz RN  Outcome: Progressing     Problem: Pain  Goal: Verbalizes/displays adequate comfort level or baseline comfort level  1/6/2025 0924 by Carlton Ruiz RN  Outcome: Progressing

## 2025-01-06 NOTE — CARE COORDINATION
Discharge order noted. [facility], however this CM met with patient and she is now requesting SNF placement. [place] of choice list provided, referral made to Juhi with [facility], await to hear back.  Yaima HURLEYN, RN  Case Management

## 2025-01-07 VITALS
DIASTOLIC BLOOD PRESSURE: 72 MMHG | SYSTOLIC BLOOD PRESSURE: 161 MMHG | HEIGHT: 72 IN | BODY MASS INDEX: 17.59 KG/M2 | HEART RATE: 65 BPM | RESPIRATION RATE: 16 BRPM | WEIGHT: 129.9 LBS | TEMPERATURE: 98.2 F | OXYGEN SATURATION: 95 %

## 2025-01-07 LAB
ANION GAP SERPL CALCULATED.3IONS-SCNC: 10 MMOL/L (ref 7–16)
BASOPHILS # BLD: 0.02 K/UL (ref 0–0.2)
BASOPHILS NFR BLD: 0 % (ref 0–2)
BUN SERPL-MCNC: 12 MG/DL (ref 6–23)
CALCIUM SERPL-MCNC: 8.7 MG/DL (ref 8.6–10.2)
CHLORIDE SERPL-SCNC: 99 MMOL/L (ref 98–107)
CO2 SERPL-SCNC: 27 MMOL/L (ref 22–29)
CREAT SERPL-MCNC: 0.5 MG/DL (ref 0.5–1)
EOSINOPHIL # BLD: 0.04 K/UL (ref 0.05–0.5)
EOSINOPHILS RELATIVE PERCENT: 0 % (ref 0–6)
ERYTHROCYTE [DISTWIDTH] IN BLOOD BY AUTOMATED COUNT: 14.2 % (ref 11.5–15)
GFR, ESTIMATED: >90 ML/MIN/1.73M2
GLUCOSE SERPL-MCNC: 177 MG/DL (ref 74–99)
HCT VFR BLD AUTO: 40.3 % (ref 34–48)
HGB BLD-MCNC: 13.1 G/DL (ref 11.5–15.5)
IMM GRANULOCYTES # BLD AUTO: 0.15 K/UL (ref 0–0.58)
IMM GRANULOCYTES NFR BLD: 1 % (ref 0–5)
LYMPHOCYTES NFR BLD: 3.37 K/UL (ref 1.5–4)
LYMPHOCYTES RELATIVE PERCENT: 24 % (ref 20–42)
MCH RBC QN AUTO: 29.9 PG (ref 26–35)
MCHC RBC AUTO-ENTMCNC: 32.5 G/DL (ref 32–34.5)
MCV RBC AUTO: 92 FL (ref 80–99.9)
MONOCYTES NFR BLD: 0.96 K/UL (ref 0.1–0.95)
MONOCYTES NFR BLD: 7 % (ref 2–12)
NEUTROPHILS NFR BLD: 68 % (ref 43–80)
NEUTS SEG NFR BLD: 9.68 K/UL (ref 1.8–7.3)
PLATELET # BLD AUTO: 330 K/UL (ref 130–450)
PMV BLD AUTO: 10.1 FL (ref 7–12)
POTASSIUM SERPL-SCNC: 3.4 MMOL/L (ref 3.5–5)
RBC # BLD AUTO: 4.38 M/UL (ref 3.5–5.5)
SODIUM SERPL-SCNC: 136 MMOL/L (ref 132–146)
WBC OTHER # BLD: 14.2 K/UL (ref 4.5–11.5)

## 2025-01-07 PROCEDURE — 6370000000 HC RX 637 (ALT 250 FOR IP): Performed by: INTERNAL MEDICINE

## 2025-01-07 PROCEDURE — 36415 COLL VENOUS BLD VENIPUNCTURE: CPT

## 2025-01-07 PROCEDURE — 97530 THERAPEUTIC ACTIVITIES: CPT

## 2025-01-07 PROCEDURE — 6370000000 HC RX 637 (ALT 250 FOR IP): Performed by: ORTHOPAEDIC SURGERY

## 2025-01-07 PROCEDURE — 80048 BASIC METABOLIC PNL TOTAL CA: CPT

## 2025-01-07 PROCEDURE — 2500000003 HC RX 250 WO HCPCS: Performed by: ORTHOPAEDIC SURGERY

## 2025-01-07 PROCEDURE — 94640 AIRWAY INHALATION TREATMENT: CPT

## 2025-01-07 PROCEDURE — 85025 COMPLETE CBC W/AUTO DIFF WBC: CPT

## 2025-01-07 RX ORDER — IPRATROPIUM BROMIDE AND ALBUTEROL SULFATE 2.5; .5 MG/3ML; MG/3ML
1 SOLUTION RESPIRATORY (INHALATION) EVERY 4 HOURS PRN
Status: DISCONTINUED | OUTPATIENT
Start: 2025-01-07 | End: 2025-01-07 | Stop reason: HOSPADM

## 2025-01-07 RX ADMIN — OXYCODONE 5 MG: 5 TABLET ORAL at 04:39

## 2025-01-07 RX ADMIN — SODIUM CHLORIDE, PRESERVATIVE FREE 10 ML: 5 INJECTION INTRAVENOUS at 08:45

## 2025-01-07 RX ADMIN — POTASSIUM CHLORIDE 40 MEQ: 1500 TABLET, EXTENDED RELEASE ORAL at 11:19

## 2025-01-07 RX ADMIN — OXYCODONE 5 MG: 5 TABLET ORAL at 00:26

## 2025-01-07 RX ADMIN — DOCUSATE SODIUM 50 MG AND SENNOSIDES 8.6 MG 1 TABLET: 8.6; 5 TABLET, FILM COATED ORAL at 08:44

## 2025-01-07 RX ADMIN — PREDNISONE 30 MG: 20 TABLET ORAL at 08:45

## 2025-01-07 RX ADMIN — ASPIRIN 81 MG: 81 TABLET, COATED ORAL at 08:45

## 2025-01-07 RX ADMIN — OXYCODONE 5 MG: 5 TABLET ORAL at 08:45

## 2025-01-07 RX ADMIN — ACETAMINOPHEN 650 MG: 325 TABLET ORAL at 11:18

## 2025-01-07 RX ADMIN — OXYCODONE 5 MG: 5 TABLET ORAL at 12:57

## 2025-01-07 RX ADMIN — ACETAMINOPHEN 650 MG: 325 TABLET ORAL at 05:36

## 2025-01-07 RX ADMIN — GABAPENTIN 300 MG: 300 CAPSULE ORAL at 08:45

## 2025-01-07 RX ADMIN — FAMOTIDINE 20 MG: 20 TABLET ORAL at 08:45

## 2025-01-07 RX ADMIN — POLYETHYLENE GLYCOL 3350 17 G: 17 POWDER, FOR SOLUTION ORAL at 08:46

## 2025-01-07 RX ADMIN — IPRATROPIUM BROMIDE AND ALBUTEROL SULFATE 1 DOSE: 2.5; .5 SOLUTION RESPIRATORY (INHALATION) at 09:15

## 2025-01-07 ASSESSMENT — PAIN SCALES - GENERAL
PAINLEVEL_OUTOF10: 8
PAINLEVEL_OUTOF10: 8
PAINLEVEL_OUTOF10: 9
PAINLEVEL_OUTOF10: 7
PAINLEVEL_OUTOF10: 9
PAINLEVEL_OUTOF10: 9
PAINLEVEL_OUTOF10: 6
PAINLEVEL_OUTOF10: 9

## 2025-01-07 ASSESSMENT — PAIN DESCRIPTION - DESCRIPTORS
DESCRIPTORS: ACHING;DISCOMFORT;SORE
DESCRIPTORS: ACHING;DISCOMFORT
DESCRIPTORS: ACHING;DISCOMFORT;SORE

## 2025-01-07 ASSESSMENT — PAIN DESCRIPTION - ORIENTATION
ORIENTATION: RIGHT

## 2025-01-07 ASSESSMENT — PAIN DESCRIPTION - LOCATION
LOCATION: HIP

## 2025-01-07 NOTE — DISCHARGE SUMMARY
MPV 10.0 10.3 10.1     Lab Results   Component Value Date    LABA1C 6.3 (H) 01/05/2025    LABA1C 6.0 (H) 11/16/2022    LABA1C 5.9 (H) 08/04/2022     No results found for: \"INR\", \"PROTIME\"   Lab Results   Component Value Date    TSH 2.100 08/05/2022    TSH 1.640 05/28/2021     Lab Results   Component Value Date    TRIG 114 08/04/2022    TRIG 160 (H) 05/28/2021    HDL 33 08/04/2022    HDL 36 05/28/2021     Recent Labs     01/06/25  0729   MG 2.1       No results for input(s): \"CKTOTAL\", \"CKMB\", \"TROPONINI\" in the last 72 hours.   No results for input(s): \"LACTA\" in the last 72 hours.    IMAGING:  XR HIP RIGHT (1 VIEW)    Result Date: 1/4/2025  EXAMINATION: ONE XRAY VIEW OF THE RIGHT HIP 1/4/2025 9:41 am COMPARISON: None. HISTORY: ORDERING SYSTEM PROVIDED HISTORY: post op BASHIR TECHNOLOGIST PROVIDED HISTORY: Of operative side while in recovery room. Reason for exam:->post op BASHIR FINDINGS: Single view of the right hip reveals hardware from replacement.  Satisfactory alignment of the prosthesis.  No hardware complication.  Postsurgical changes seen in the soft tissues.     Satisfactory postoperative appearance of the right hip.     XR CHEST PORTABLE    Result Date: 1/2/2025  EXAMINATION: ONE XRAY VIEW OF THE CHEST 1/2/2025 3:46 pm COMPARISON: 03/25/2024 HISTORY: ORDERING SYSTEM PROVIDED HISTORY: fall TECHNOLOGIST PROVIDED HISTORY: Reason for exam:->fall FINDINGS: The lungs are without acute focal process.  There is no effusion or pneumothorax. The cardiomediastinal silhouette is without acute process. The osseous structures are without acute process.     No acute process.     XR HIP 2-3 VW W PELVIS RIGHT    Addendum Date: 1/2/2025    ADDENDUM: Rereview of the images demonstrates a right femoral neck fracture.     Result Date: 1/2/2025  EXAMINATION: ONE XRAY VIEW OF THE PELVIS AND TWO XRAY VIEWS RIGHT HIP 1/2/2025 1:39 pm COMPARISON: None. HISTORY: ORDERING SYSTEM PROVIDED HISTORY: pain TECHNOLOGIST PROVIDED HISTORY:

## 2025-01-07 NOTE — PLAN OF CARE

## 2025-01-07 NOTE — PLAN OF CARE
Problem: Discharge Planning  Goal: Discharge to home or other facility with appropriate resources  1/7/2025 1036 by Mai Bolden, RN  Outcome: Progressing     Problem: Chronic Conditions and Co-morbidities  Goal: Patient's chronic conditions and co-morbidity symptoms are monitored and maintained or improved  1/7/2025 1036 by Mai Bolden, RN  Outcome: Progressing     Problem: ABCDS Injury Assessment  Goal: Absence of physical injury  1/7/2025 1036 by Mai Bolden, RN  Outcome: Progressing     Problem: Safety - Adult  Goal: Free from fall injury  1/7/2025 1036 by Mai Bolden, RN  Outcome: Progressing     Problem: Skin/Tissue Integrity  Goal: Absence of new skin breakdown  Description: 1.  Monitor for areas of redness and/or skin breakdown  2.  Assess vascular access sites hourly  3.  Every 4-6 hours minimum:  Change oxygen saturation probe site  4.  Every 4-6 hours:  If on nasal continuous positive airway pressure, respiratory therapy assess nares and determine need for appliance change or resting period.  1/7/2025 1036 by Mai Bolden, RN  Outcome: Progressing     Problem: Pain  Goal: Verbalizes/displays adequate comfort level or baseline comfort level  1/7/2025 1036 by Mai Bolden, RN  Outcome: Progressing

## 2025-01-07 NOTE — CARE COORDINATION
1/7/2025  Social Work Discharge Planning:Sw went to discuss AMPAC of 18/24 and home with [facility] who she is active with. Pt started to cry and states she cant go home and would like to go to a [facility]. Pt resides alone. Pt states she is doing much worse than yesterday. SW prompted therapy to see if they can reeval Pt if possible to see if Pt will qualify for [facility]. Waiting reply. Electronically signed by COLLIN Zuniga on 1/7/2025 at 11:45 AM    1/7/2025  Social Work Discharge Planning:SW made a referral to [facility] for ww and TSR with [facility]. Sw also made a referral to [facility] and cancelled [facility] due to [facility] cannot provide an aide for Pt. Electronically signed by COLLIN Zuniga on 1/7/2025 at 2:37 PM

## 2025-01-07 NOTE — PROGRESS NOTES
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 at 01/04/25 0534    sodium chloride flush 0.9 % injection 5-40 mL  5-40 mL IntraVENous 2 times per day Chepe Rothman MD        sodium chloride flush 0.9 % injection 5-40 mL  5-40 mL IntraVENous PRN Chepe Rothman MD        0.9 % sodium chloride infusion   IntraVENous PRN Chepe Rothman MD        potassium chloride (KLOR-CON M) extended release tablet 40 mEq  40 mEq Oral PRN Chepe Rothman MD        Or    potassium bicarb-citric acid (EFFER-K) effervescent tablet 40 mEq  40 mEq Oral PRN Chepe Rothman MD        Or    potassium chloride 10 mEq/100 mL IVPB (Peripheral Line)  10 mEq IntraVENous PRN Chepe Rothman MD        magnesium sulfate 2000 mg in 50 mL IVPB premix  2,000 mg IntraVENous PRN Chepe Rothman MD        ondansetron (ZOFRAN-ODT) disintegrating tablet 4 mg  4 mg Oral Q8H PRN Chepe Rothman MD        Or    ondansetron (ZOFRAN) injection 4 mg  4 mg IntraVENous Q6H PRN Chepe Rothman MD        dextrose 5 % and 0.45 % sodium chloride infusion   IntraVENous Continuous Chepe Rothman MD 75 mL/hr at 01/03/25 1638 New Bag at 01/03/25 1635 
RIGHT    Addendum Date: 1/2/2025    ADDENDUM: Rereview of the images demonstrates a right femoral neck fracture.     Result Date: 1/2/2025  EXAMINATION: ONE XRAY VIEW OF THE PELVIS AND TWO XRAY VIEWS RIGHT HIP 1/2/2025 1:39 pm COMPARISON: None. HISTORY: ORDERING SYSTEM PROVIDED HISTORY: pain TECHNOLOGIST PROVIDED HISTORY: Reason for exam:->pain FINDINGS: The hip demonstrates normal alignment. No evidence of acute fracture.  No focal osseus lesion.   Pelvis is intact.  Moderate to severe bilateral hip joint degenerative changes.     Moderate to severe bilateral hip joint degenerative changes. No acute abnormality of the hip.     XR FEMUR RIGHT (MIN 2 VIEWS)    Result Date: 1/2/2025  EXAMINATION: XRAY VIEWS OF THE RIGHT FEMUR 1/2/2025 1:39 pm COMPARISON: None. HISTORY: ORDERING SYSTEM PROVIDED HISTORY: injury TECHNOLOGIST PROVIDED HISTORY: Reason for exam:->injury FINDINGS: There is deformity of the femoral neck highly suspicious for minimally displaced fracture.  There is adjacent soft tissue swelling.  The femoral shaft is intact.     Right femoral neck fracture.       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 
disorder, recurrent, unspecified 07/27/2023    Numbness and tingling of both feet 07/27/2023    Acquired hammer toe of left foot 01/25/2023    Discoloration of skin of lower leg 03/21/2019    Tobacco dependence 03/21/2019       Right femoral neck fracture s/p Fall  Xrays noted  S/p right BASHIR 1/4  Continue post-operative management, pain control and DVT prophylaxis as per Ortho  PT AM-PAC-- 18/24  OT AM-PAC-- 19/24  Orthopedic Surgery consult appreciated     COPD Exacerbation  Continue steroids -- taper as per Pulmonary  Continue bronchodilators for now -- likely transition to LAMA/LABA as outpatient  Needs outpatient PFTs  Pulmonary consult appreciated     Hyperlipidemia  Continue statin    Follow labs  Please see orders for further management and care.   for discharge planning  Discharge plan: SNF vs home with [facility] once arrangements finalized -- hopefully today    The pertinent details of this case were discussed with Dr. Salter.    Electronically signed by FREDDY Lopez CNP on 1/7/2025 at 7:29 AM

## 2025-01-07 NOTE — DISCHARGE INSTR - COC
FLUZONE, (age 6 mo+), Quadv MDV, 0.5mL 11/05/2018, 10/14/2020       Active Problems:  Patient Active Problem List   Diagnosis Code    Discoloration of skin of lower leg L81.9    Tobacco dependence F17.200    Osteomyelitis M86.9    Tobacco use Z72.0    PAD (peripheral artery disease) ([facility]) I73.9    HLD (hyperlipidemia) E78.5    Amputation of left great toe ([facility]) S98.112A    Osteomyelitis of ankle or foot, acute, left M86.172    Chronic, continuous use of opioids F11.90    Lumbar spondylolysis M43.06    Impaired glucose tolerance R73.02    Degenerative, intervertebral disc, cervical M50.30    Acquired hammer toe of left foot M20.42    Major depressive disorder, recurrent, mild F33.0    Major depressive disorder, recurrent, moderate F33.1    Major depressive disorder, recurrent, unspecified F33.9    Numbness and tingling of both feet R20.0, R20.2    Foot pain, bilateral M79.671, M79.672    Lumbar pain M54.50    Chronic respiratory failure with hypoxia J96.11    Centrilobular emphysema ([facility]) J43.2    Foot neuralgia M79.2    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 S72.051K    Closed displaced fracture of right femoral neck ([facility]) S72.001A    Chronic pain G89.29    Asthma J45.909       Isolation/Infection:   Isolation            No Isolation          Patient Infection Status       Infection Onset Added Last Indicated Last Indicated By Review Planned Expiration Resolved Resolved By    None active    Resolved    MRSA 11/15/22 11/18/22 11/16/22 Culture, Wound Aerobic Only   03/07/23 Betty Ackerman    Wounds healed per patient  Toe 11/15/22  MRSA wound foot 11/16/2022            Nurse Assessment:  Last Vital Signs: BP (!) 161/72   Pulse 65   Temp 98.2 °F (36.8 °C) (Oral)   Resp 16   Ht 1.829 m (6')   Wt 58.9 kg (129 lb 14.4 oz)   SpO2 95%   BMI 17.62 kg/m²     Last documented pain score (0-10 scale): Pain Level: 9  Last Weight:   Wt Readings from Last 1 Encounters:   01/02/25 58.9 kg (129 lb 14.4 oz)

## 2025-01-08 ENCOUNTER — TELEPHONE (OUTPATIENT)
Dept: PRIMARY CARE CLINIC | Age: 70
End: 2025-01-08

## 2025-01-08 NOTE — TELEPHONE ENCOUNTER
Patient was in the hospital from 01/02-01/07 for a hip fracture from a fall.  Patient fell in the [facility] Follow up Report.  I am not sure if you would like to see patient in follow up or not since she had surgery on the hip.  Please advise.  Thank you

## 2025-01-09 ENCOUNTER — TELEPHONE (OUTPATIENT)
Dept: PRIMARY CARE CLINIC | Age: 70
End: 2025-01-09

## 2025-01-09 LAB — SURGICAL PATHOLOGY REPORT: NORMAL

## 2025-01-09 NOTE — TELEPHONE ENCOUNTER
Care Transitions Initial Follow Up Call    Outreach made within 2 business days of discharge: Yes    Patient: Krista Johnson Patient : 1955   MRN: [id number]  Reason for Admission: Hip fracture  Discharge Date: 25       Spoke with: Patient    Discharge department/facility: [facility] Interactive Patient Contact:  Was patient able to fill all prescriptions: Yes  Was patient instructed to bring all medications to the follow-up visit: Yes  Is patient taking all medications as directed in the discharge summary? Yes  Does patient understand their discharge instructions: Yes  Does patient have questions or concerns that need addressed prior to 7-14 day follow up office visit: no    Additional needs identified to be addressed with provider  No needs identified             Scheduled appointment with PCP within 7-14 days    Follow Up  Future Appointments   Date Time Provider Department Center   2025 10:45 AM Bon Morris DO [facility] DEP       Iram Durand MA

## 2025-01-09 NOTE — TELEPHONE ENCOUNTER
Care Transitions Initial Follow Up Call    Outreach made within 2 business days of discharge: Yes    Patient: Krista Johnson Patient : 1955   MRN: [id number]  Reason for Admission: Hip fracture  Discharge Date: 25       Spoke with: No answer    Discharge department/facility: [facility]    Scheduled appointment with PCP within 7-14 days    Follow Up  Future Appointments   Date Time Provider Department Center   2025 10:45 AM Bon Morris, DO [facility] DEP       Iram Durand MA

## 2025-01-29 RX ORDER — ATORVASTATIN CALCIUM 40 MG/1
40 TABLET, FILM COATED ORAL NIGHTLY
Qty: 90 TABLET | Refills: 3 | Status: SHIPPED | OUTPATIENT
Start: 2025-01-29

## 2025-01-29 NOTE — TELEPHONE ENCOUNTER
Name of Medication(s) Requested:  Requested Prescriptions     Pending Prescriptions Disp Refills    atorvastatin (LIPITOR) 40 MG tablet 90 tablet 3     Sig: Take 1 tablet by mouth nightly       Medication is on current medication list Yes    Dosage and directions were verified? Yes    Quantity verified: 90 day supply     Pharmacy Verified?  Yes    Last Appointment:  4/24/2024    Future appts:  No future appointments.     (If no appt send self scheduling link. .REFILLAPPT)  Scheduling request sent?     [] Yes  [] No    Does patient need updated?  [] Yes  [] No

## 2025-02-03 ENCOUNTER — APPOINTMENT (OUTPATIENT)
Dept: ULTRASOUND IMAGING | Age: 70
DRG: 468 | End: 2025-02-03
Payer: COMMERCIAL

## 2025-02-03 ENCOUNTER — HOSPITAL ENCOUNTER (INPATIENT)
Age: 70
LOS: 1 days | Discharge: HOME HEALTH | DRG: 468 | End: 2025-02-06
Attending: EMERGENCY MEDICINE | Admitting: INTERNAL MEDICINE
Payer: COMMERCIAL

## 2025-02-03 ENCOUNTER — APPOINTMENT (OUTPATIENT)
Dept: GENERAL RADIOLOGY | Age: 70
DRG: 468 | End: 2025-02-03
Payer: COMMERCIAL

## 2025-02-03 DIAGNOSIS — S73.004A HIP DISLOCATION, RIGHT, INITIAL ENCOUNTER (HCC): ICD-10-CM

## 2025-02-03 DIAGNOSIS — G89.18 POST-OPERATIVE PAIN: ICD-10-CM

## 2025-02-03 DIAGNOSIS — S73.014A CLOSED POSTERIOR DISLOCATION OF RIGHT HIP, INITIAL ENCOUNTER (HCC): Primary | ICD-10-CM

## 2025-02-03 LAB
ALBUMIN SERPL-MCNC: 3.4 G/DL (ref 3.5–5.2)
ALP SERPL-CCNC: 45 U/L (ref 35–104)
ALT SERPL-CCNC: 15 U/L (ref 0–32)
ANION GAP SERPL CALCULATED.3IONS-SCNC: 9 MMOL/L (ref 7–16)
AST SERPL-CCNC: 19 U/L (ref 0–31)
BASOPHILS # BLD: 0.08 K/UL (ref 0–0.2)
BASOPHILS NFR BLD: 1 % (ref 0–2)
BILIRUB SERPL-MCNC: 0.2 MG/DL (ref 0–1.2)
BUN SERPL-MCNC: 14 MG/DL (ref 6–23)
CALCIUM SERPL-MCNC: 8.1 MG/DL (ref 8.6–10.2)
CHLORIDE SERPL-SCNC: 108 MMOL/L (ref 98–107)
CO2 SERPL-SCNC: 24 MMOL/L (ref 22–29)
CREAT SERPL-MCNC: 0.5 MG/DL (ref 0.5–1)
EOSINOPHIL # BLD: 0.23 K/UL (ref 0.05–0.5)
EOSINOPHILS RELATIVE PERCENT: 2 % (ref 0–6)
ERYTHROCYTE [DISTWIDTH] IN BLOOD BY AUTOMATED COUNT: 14.8 % (ref 11.5–15)
GFR, ESTIMATED: >90 ML/MIN/1.73M2
GLUCOSE SERPL-MCNC: 107 MG/DL (ref 74–99)
HCT VFR BLD AUTO: 43.2 % (ref 34–48)
HGB BLD-MCNC: 13.7 G/DL (ref 11.5–15.5)
IMM GRANULOCYTES # BLD AUTO: 0.04 K/UL (ref 0–0.58)
IMM GRANULOCYTES NFR BLD: 0 % (ref 0–5)
INR PPP: 1
LYMPHOCYTES NFR BLD: 3.76 K/UL (ref 1.5–4)
LYMPHOCYTES RELATIVE PERCENT: 26 % (ref 20–42)
MCH RBC QN AUTO: 29.5 PG (ref 26–35)
MCHC RBC AUTO-ENTMCNC: 31.7 G/DL (ref 32–34.5)
MCV RBC AUTO: 93.1 FL (ref 80–99.9)
MONOCYTES NFR BLD: 0.81 K/UL (ref 0.1–0.95)
MONOCYTES NFR BLD: 6 % (ref 2–12)
NEUTROPHILS NFR BLD: 66 % (ref 43–80)
NEUTS SEG NFR BLD: 9.36 K/UL (ref 1.8–7.3)
PARTIAL THROMBOPLASTIN TIME: 30.9 SEC (ref 24.5–35.1)
PLATELET # BLD AUTO: 405 K/UL (ref 130–450)
PMV BLD AUTO: 9.6 FL (ref 7–12)
POTASSIUM SERPL-SCNC: 4.2 MMOL/L (ref 3.5–5)
PROT SERPL-MCNC: 6.4 G/DL (ref 6.4–8.3)
PROTHROMBIN TIME: 10.9 SEC (ref 9.3–12.4)
RBC # BLD AUTO: 4.64 M/UL (ref 3.5–5.5)
SODIUM SERPL-SCNC: 141 MMOL/L (ref 132–146)
WBC OTHER # BLD: 14.3 K/UL (ref 4.5–11.5)

## 2025-02-03 PROCEDURE — G0378 HOSPITAL OBSERVATION PER HR: HCPCS

## 2025-02-03 PROCEDURE — 2500000003 HC RX 250 WO HCPCS: Performed by: PHYSICIAN ASSISTANT

## 2025-02-03 PROCEDURE — 94640 AIRWAY INHALATION TREATMENT: CPT

## 2025-02-03 PROCEDURE — 93971 EXTREMITY STUDY: CPT

## 2025-02-03 PROCEDURE — 6360000002 HC RX W HCPCS

## 2025-02-03 PROCEDURE — 85730 THROMBOPLASTIN TIME PARTIAL: CPT

## 2025-02-03 PROCEDURE — 96374 THER/PROPH/DIAG INJ IV PUSH: CPT

## 2025-02-03 PROCEDURE — 85025 COMPLETE CBC W/AUTO DIFF WBC: CPT

## 2025-02-03 PROCEDURE — 85610 PROTHROMBIN TIME: CPT

## 2025-02-03 PROCEDURE — 73502 X-RAY EXAM HIP UNI 2-3 VIEWS: CPT

## 2025-02-03 PROCEDURE — 6360000002 HC RX W HCPCS: Performed by: PHYSICIAN ASSISTANT

## 2025-02-03 PROCEDURE — 6360000002 HC RX W HCPCS: Performed by: EMERGENCY MEDICINE

## 2025-02-03 PROCEDURE — 6370000000 HC RX 637 (ALT 250 FOR IP): Performed by: PHYSICIAN ASSISTANT

## 2025-02-03 PROCEDURE — 99285 EMERGENCY DEPT VISIT HI MDM: CPT

## 2025-02-03 PROCEDURE — 80053 COMPREHEN METABOLIC PANEL: CPT

## 2025-02-03 PROCEDURE — 73501 X-RAY EXAM HIP UNI 1 VIEW: CPT

## 2025-02-03 PROCEDURE — 96375 TX/PRO/DX INJ NEW DRUG ADDON: CPT

## 2025-02-03 RX ORDER — ONDANSETRON 4 MG/1
4 TABLET, ORALLY DISINTEGRATING ORAL EVERY 8 HOURS PRN
Status: DISCONTINUED | OUTPATIENT
Start: 2025-02-03 | End: 2025-02-04 | Stop reason: SDUPTHER

## 2025-02-03 RX ORDER — SODIUM CHLORIDE 0.9 % (FLUSH) 0.9 %
10 SYRINGE (ML) INJECTION PRN
Status: DISCONTINUED | OUTPATIENT
Start: 2025-02-03 | End: 2025-02-06 | Stop reason: HOSPADM

## 2025-02-03 RX ORDER — FENTANYL CITRATE 50 UG/ML
50 INJECTION, SOLUTION INTRAMUSCULAR; INTRAVENOUS ONCE
Status: COMPLETED | OUTPATIENT
Start: 2025-02-03 | End: 2025-02-03

## 2025-02-03 RX ORDER — SODIUM CHLORIDE 0.9 % (FLUSH) 0.9 %
10 SYRINGE (ML) INJECTION EVERY 12 HOURS SCHEDULED
Status: DISCONTINUED | OUTPATIENT
Start: 2025-02-03 | End: 2025-02-06 | Stop reason: HOSPADM

## 2025-02-03 RX ORDER — ACETAMINOPHEN 325 MG/1
650 TABLET ORAL EVERY 6 HOURS PRN
Status: DISCONTINUED | OUTPATIENT
Start: 2025-02-03 | End: 2025-02-06 | Stop reason: HOSPADM

## 2025-02-03 RX ORDER — GABAPENTIN 300 MG/1
300 CAPSULE ORAL 2 TIMES DAILY
Status: DISCONTINUED | OUTPATIENT
Start: 2025-02-03 | End: 2025-02-06 | Stop reason: HOSPADM

## 2025-02-03 RX ORDER — ONDANSETRON 2 MG/ML
4 INJECTION INTRAMUSCULAR; INTRAVENOUS EVERY 6 HOURS PRN
Status: DISCONTINUED | OUTPATIENT
Start: 2025-02-03 | End: 2025-02-04 | Stop reason: SDUPTHER

## 2025-02-03 RX ORDER — MORPHINE SULFATE 2 MG/ML
2 INJECTION, SOLUTION INTRAMUSCULAR; INTRAVENOUS EVERY 4 HOURS PRN
Status: DISCONTINUED | OUTPATIENT
Start: 2025-02-03 | End: 2025-02-06 | Stop reason: HOSPADM

## 2025-02-03 RX ORDER — OXYCODONE AND ACETAMINOPHEN 5; 325 MG/1; MG/1
1 TABLET ORAL EVERY 4 HOURS PRN
Status: DISCONTINUED | OUTPATIENT
Start: 2025-02-03 | End: 2025-02-06 | Stop reason: HOSPADM

## 2025-02-03 RX ORDER — ASPIRIN 81 MG/1
81 TABLET ORAL 2 TIMES DAILY
Status: DISCONTINUED | OUTPATIENT
Start: 2025-02-03 | End: 2025-02-06 | Stop reason: HOSPADM

## 2025-02-03 RX ORDER — ACETAMINOPHEN 650 MG/1
650 SUPPOSITORY RECTAL EVERY 6 HOURS PRN
Status: DISCONTINUED | OUTPATIENT
Start: 2025-02-03 | End: 2025-02-06 | Stop reason: HOSPADM

## 2025-02-03 RX ORDER — ORPHENADRINE CITRATE 30 MG/ML
60 INJECTION INTRAMUSCULAR; INTRAVENOUS ONCE
Status: COMPLETED | OUTPATIENT
Start: 2025-02-03 | End: 2025-02-03

## 2025-02-03 RX ORDER — ATORVASTATIN CALCIUM 40 MG/1
40 TABLET, FILM COATED ORAL NIGHTLY
Status: DISCONTINUED | OUTPATIENT
Start: 2025-02-03 | End: 2025-02-06 | Stop reason: HOSPADM

## 2025-02-03 RX ORDER — POTASSIUM CHLORIDE 7.45 MG/ML
10 INJECTION INTRAVENOUS PRN
Status: DISCONTINUED | OUTPATIENT
Start: 2025-02-03 | End: 2025-02-06 | Stop reason: HOSPADM

## 2025-02-03 RX ORDER — ARFORMOTEROL TARTRATE 15 UG/2ML
15 SOLUTION RESPIRATORY (INHALATION)
Status: DISCONTINUED | OUTPATIENT
Start: 2025-02-03 | End: 2025-02-06 | Stop reason: HOSPADM

## 2025-02-03 RX ORDER — POTASSIUM CHLORIDE 1500 MG/1
40 TABLET, EXTENDED RELEASE ORAL PRN
Status: DISCONTINUED | OUTPATIENT
Start: 2025-02-03 | End: 2025-02-06 | Stop reason: HOSPADM

## 2025-02-03 RX ORDER — SENNOSIDES A AND B 8.6 MG/1
1 TABLET, FILM COATED ORAL DAILY PRN
Status: DISCONTINUED | OUTPATIENT
Start: 2025-02-03 | End: 2025-02-06 | Stop reason: HOSPADM

## 2025-02-03 RX ORDER — SODIUM CHLORIDE 9 MG/ML
INJECTION, SOLUTION INTRAVENOUS PRN
Status: DISCONTINUED | OUTPATIENT
Start: 2025-02-03 | End: 2025-02-06 | Stop reason: HOSPADM

## 2025-02-03 RX ORDER — MAGNESIUM SULFATE IN WATER 40 MG/ML
2000 INJECTION, SOLUTION INTRAVENOUS PRN
Status: DISCONTINUED | OUTPATIENT
Start: 2025-02-03 | End: 2025-02-06 | Stop reason: HOSPADM

## 2025-02-03 RX ADMIN — OXYCODONE HYDROCHLORIDE AND ACETAMINOPHEN 1 TABLET: 5; 325 TABLET ORAL at 22:33

## 2025-02-03 RX ADMIN — ORPHENADRINE CITRATE 60 MG: 30 INJECTION INTRAMUSCULAR; INTRAVENOUS at 19:10

## 2025-02-03 RX ADMIN — ARFORMOTEROL TARTRATE 15 MCG: 15 SOLUTION RESPIRATORY (INHALATION) at 22:48

## 2025-02-03 RX ADMIN — SODIUM CHLORIDE, PRESERVATIVE FREE 10 ML: 5 INJECTION INTRAVENOUS at 22:33

## 2025-02-03 RX ADMIN — FENTANYL CITRATE 50 MCG: 50 INJECTION INTRAMUSCULAR; INTRAVENOUS at 19:09

## 2025-02-03 RX ADMIN — ATORVASTATIN CALCIUM 40 MG: 40 TABLET, FILM COATED ORAL at 22:33

## 2025-02-03 RX ADMIN — FENTANYL CITRATE 50 MCG: 50 INJECTION INTRAMUSCULAR; INTRAVENOUS at 17:11

## 2025-02-03 RX ADMIN — IPRATROPIUM BROMIDE 0.5 MG: 0.5 SOLUTION RESPIRATORY (INHALATION) at 22:48

## 2025-02-03 RX ADMIN — PROPOFOL 90 MG: 10 INJECTION, EMULSION INTRAVENOUS at 18:05

## 2025-02-03 ASSESSMENT — PAIN DESCRIPTION - LOCATION
LOCATION: HIP

## 2025-02-03 ASSESSMENT — PAIN DESCRIPTION - ORIENTATION
ORIENTATION: RIGHT

## 2025-02-03 ASSESSMENT — PAIN SCALES - GENERAL
PAINLEVEL_OUTOF10: 10
PAINLEVEL_OUTOF10: 8
PAINLEVEL_OUTOF10: 4
PAINLEVEL_OUTOF10: 6

## 2025-02-03 ASSESSMENT — PAIN DESCRIPTION - DESCRIPTORS
DESCRIPTORS: ACHING;SORE;THROBBING
DESCRIPTORS: SPASM;TIGHTNESS
DESCRIPTORS: ACHING;DULL;JABBING

## 2025-02-03 ASSESSMENT — PAIN - FUNCTIONAL ASSESSMENT
PAIN_FUNCTIONAL_ASSESSMENT: ACTIVITIES ARE NOT PREVENTED
PAIN_FUNCTIONAL_ASSESSMENT: PREVENTS OR INTERFERES WITH MANY ACTIVE NOT PASSIVE ACTIVITIES
PAIN_FUNCTIONAL_ASSESSMENT: ACTIVITIES ARE NOT PREVENTED

## 2025-02-03 NOTE — CARE COORDINATION
Internal Medicine On-call Care Coordination Note    I was called by the ED physician because they recommended admission for this patient and I cover their PCP.  The history as I understand it after discussion with the ED physician is as follows:    S/p right BASHIR 1/4  Presents today with hip pain, found to have dislocation  Denies fall / trauma  ED spoke with ortho, planning to take to OR tomorrow  Decision made for admission    I placed admission orders.  Including:    NPO at [place]  Orthopedics consult  PRN pain control  SCDs, defer DVT ppx to surgery post op    Dr. Salter or his coverage will see the patient tomorrow for H&P.    Electronically signed by Zion Fernandez PA-C on 2/3/2025 at 6:42 PM    n/a
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
## 2025-02-04 ENCOUNTER — APPOINTMENT (OUTPATIENT)
Dept: GENERAL RADIOLOGY | Age: 70
DRG: 468 | End: 2025-02-04
Payer: COMMERCIAL

## 2025-02-04 ENCOUNTER — ANESTHESIA (OUTPATIENT)
Dept: OPERATING ROOM | Age: 70
DRG: 468 | End: 2025-02-04
Payer: COMMERCIAL

## 2025-02-04 ENCOUNTER — ANESTHESIA EVENT (OUTPATIENT)
Dept: OPERATING ROOM | Age: 70
DRG: 468 | End: 2025-02-04
Payer: COMMERCIAL

## 2025-02-04 PROBLEM — T84.020A DISLOCATION OF INTERNAL RIGHT HIP PROSTHESIS (HCC): Status: ACTIVE | Noted: 2025-02-04

## 2025-02-04 PROBLEM — J44.9 COPD (CHRONIC OBSTRUCTIVE PULMONARY DISEASE) (HCC): Status: ACTIVE | Noted: 2025-02-04

## 2025-02-04 LAB
ALBUMIN SERPL-MCNC: 3.2 G/DL (ref 3.5–5.2)
ALP SERPL-CCNC: 42 U/L (ref 35–104)
ALT SERPL-CCNC: 24 U/L (ref 0–32)
ANION GAP SERPL CALCULATED.3IONS-SCNC: 11 MMOL/L (ref 7–16)
AST SERPL-CCNC: 22 U/L (ref 0–31)
ATYPICAL LYMPHOCYTE ABSOLUTE COUNT: 0.38 K/UL (ref 0–0.46)
ATYPICAL LYMPHOCYTES: 3 % (ref 0–4)
BASOPHILS # BLD: 0 K/UL (ref 0–0.2)
BASOPHILS NFR BLD: 0 % (ref 0–2)
BILIRUB SERPL-MCNC: 0.2 MG/DL (ref 0–1.2)
BUN SERPL-MCNC: 12 MG/DL (ref 6–23)
CALCIUM SERPL-MCNC: 8.4 MG/DL (ref 8.6–10.2)
CHLORIDE SERPL-SCNC: 109 MMOL/L (ref 98–107)
CO2 SERPL-SCNC: 21 MMOL/L (ref 22–29)
CREAT SERPL-MCNC: 0.5 MG/DL (ref 0.5–1)
EOSINOPHIL # BLD: 0 K/UL (ref 0.05–0.5)
EOSINOPHILS RELATIVE PERCENT: 0 % (ref 0–6)
ERYTHROCYTE [DISTWIDTH] IN BLOOD BY AUTOMATED COUNT: 14.6 % (ref 11.5–15)
GFR, ESTIMATED: >90 ML/MIN/1.73M2
GLUCOSE SERPL-MCNC: 97 MG/DL (ref 74–99)
HCT VFR BLD AUTO: 40.8 % (ref 34–48)
HGB BLD-MCNC: 12.7 G/DL (ref 11.5–15.5)
LYMPHOCYTES NFR BLD: 3.88 K/UL (ref 1.5–4)
LYMPHOCYTES RELATIVE PERCENT: 27 % (ref 20–42)
MCH RBC QN AUTO: 29.1 PG (ref 26–35)
MCHC RBC AUTO-ENTMCNC: 31.1 G/DL (ref 32–34.5)
MCV RBC AUTO: 93.6 FL (ref 80–99.9)
MONOCYTES NFR BLD: 1 K/UL (ref 0.1–0.95)
MONOCYTES NFR BLD: 7 % (ref 2–12)
NEUTROPHILS NFR BLD: 64 % (ref 43–80)
NEUTS SEG NFR BLD: 9.14 K/UL (ref 1.8–7.3)
PLATELET # BLD AUTO: 365 K/UL (ref 130–450)
PMV BLD AUTO: 9.9 FL (ref 7–12)
POTASSIUM SERPL-SCNC: 3.6 MMOL/L (ref 3.5–5)
PROT SERPL-MCNC: 5.9 G/DL (ref 6.4–8.3)
RBC # BLD AUTO: 4.36 M/UL (ref 3.5–5.5)
RBC # BLD: NORMAL 10*6/UL
SODIUM SERPL-SCNC: 141 MMOL/L (ref 132–146)
WBC OTHER # BLD: 14.4 K/UL (ref 4.5–11.5)

## 2025-02-04 PROCEDURE — 6360000002 HC RX W HCPCS: Performed by: ORTHOPAEDIC SURGERY

## 2025-02-04 PROCEDURE — 80053 COMPREHEN METABOLIC PANEL: CPT

## 2025-02-04 PROCEDURE — 0SS90ZZ REPOSITION RIGHT HIP JOINT, OPEN APPROACH: ICD-10-PCS | Performed by: ORTHOPAEDIC SURGERY

## 2025-02-04 PROCEDURE — 2500000003 HC RX 250 WO HCPCS: Performed by: PHYSICIAN ASSISTANT

## 2025-02-04 PROCEDURE — 6370000000 HC RX 637 (ALT 250 FOR IP): Performed by: PHYSICIAN ASSISTANT

## 2025-02-04 PROCEDURE — 88300 SURGICAL PATH GROSS: CPT

## 2025-02-04 PROCEDURE — 0SPA0JZ REMOVAL OF SYNTHETIC SUBSTITUTE FROM RIGHT HIP JOINT, ACETABULAR SURFACE, OPEN APPROACH: ICD-10-PCS | Performed by: ORTHOPAEDIC SURGERY

## 2025-02-04 PROCEDURE — 6360000002 HC RX W HCPCS: Performed by: PHYSICIAN ASSISTANT

## 2025-02-04 PROCEDURE — 2580000003 HC RX 258

## 2025-02-04 PROCEDURE — 94640 AIRWAY INHALATION TREATMENT: CPT

## 2025-02-04 PROCEDURE — 6360000002 HC RX W HCPCS

## 2025-02-04 PROCEDURE — 0SRA0JZ REPLACEMENT OF RIGHT HIP JOINT, ACETABULAR SURFACE WITH SYNTHETIC SUBSTITUTE, OPEN APPROACH: ICD-10-PCS | Performed by: ORTHOPAEDIC SURGERY

## 2025-02-04 PROCEDURE — 36415 COLL VENOUS BLD VENIPUNCTURE: CPT

## 2025-02-04 PROCEDURE — 3700000001 HC ADD 15 MINUTES (ANESTHESIA): Performed by: ORTHOPAEDIC SURGERY

## 2025-02-04 PROCEDURE — 6370000000 HC RX 637 (ALT 250 FOR IP): Performed by: ORTHOPAEDIC SURGERY

## 2025-02-04 PROCEDURE — 73501 X-RAY EXAM HIP UNI 1 VIEW: CPT

## 2025-02-04 PROCEDURE — 7100000000 HC PACU RECOVERY - FIRST 15 MIN: Performed by: ORTHOPAEDIC SURGERY

## 2025-02-04 PROCEDURE — 3700000000 HC ANESTHESIA ATTENDED CARE: Performed by: ORTHOPAEDIC SURGERY

## 2025-02-04 PROCEDURE — 2709999900 HC NON-CHARGEABLE SUPPLY: Performed by: ORTHOPAEDIC SURGERY

## 2025-02-04 PROCEDURE — G0378 HOSPITAL OBSERVATION PER HR: HCPCS

## 2025-02-04 PROCEDURE — 3600000013 HC SURGERY LEVEL 3 ADDTL 15MIN: Performed by: ORTHOPAEDIC SURGERY

## 2025-02-04 PROCEDURE — 7100000001 HC PACU RECOVERY - ADDTL 15 MIN: Performed by: ORTHOPAEDIC SURGERY

## 2025-02-04 PROCEDURE — 85025 COMPLETE CBC W/AUTO DIFF WBC: CPT

## 2025-02-04 PROCEDURE — C1776 JOINT DEVICE (IMPLANTABLE): HCPCS | Performed by: ORTHOPAEDIC SURGERY

## 2025-02-04 PROCEDURE — 2580000003 HC RX 258: Performed by: ORTHOPAEDIC SURGERY

## 2025-02-04 PROCEDURE — 2500000003 HC RX 250 WO HCPCS

## 2025-02-04 PROCEDURE — 3600000003 HC SURGERY LEVEL 3 BASE: Performed by: ORTHOPAEDIC SURGERY

## 2025-02-04 DEVICE — IMPLANTABLE DEVICE
Type: IMPLANTABLE DEVICE | Site: HIP | Status: FUNCTIONAL
Brand: VIVACIT-E®

## 2025-02-04 DEVICE — BIOLOX® OPTION, HEAD, XL, Ø 28/+7, TAPER 12/14
Type: IMPLANTABLE DEVICE | Site: HIP | Status: FUNCTIONAL
Brand: BIOLOX® OPTION

## 2025-02-04 RX ORDER — ACETAMINOPHEN 500 MG
1000 TABLET ORAL ONCE
Status: DISCONTINUED | OUTPATIENT
Start: 2025-02-04 | End: 2025-02-06 | Stop reason: HOSPADM

## 2025-02-04 RX ORDER — ONDANSETRON 2 MG/ML
4 INJECTION INTRAMUSCULAR; INTRAVENOUS EVERY 6 HOURS PRN
Status: DISCONTINUED | OUTPATIENT
Start: 2025-02-04 | End: 2025-02-06 | Stop reason: HOSPADM

## 2025-02-04 RX ORDER — SENNA AND DOCUSATE SODIUM 50; 8.6 MG/1; MG/1
1 TABLET, FILM COATED ORAL 2 TIMES DAILY
Status: DISCONTINUED | OUTPATIENT
Start: 2025-02-04 | End: 2025-02-06 | Stop reason: HOSPADM

## 2025-02-04 RX ORDER — SODIUM CHLORIDE 9 MG/ML
INJECTION, SOLUTION INTRAVENOUS PRN
Status: DISCONTINUED | OUTPATIENT
Start: 2025-02-04 | End: 2025-02-06 | Stop reason: HOSPADM

## 2025-02-04 RX ORDER — DEXAMETHASONE SODIUM PHOSPHATE 10 MG/ML
8 INJECTION, SOLUTION INTRAMUSCULAR; INTRAVENOUS ONCE
Status: DISCONTINUED | OUTPATIENT
Start: 2025-02-04 | End: 2025-02-06 | Stop reason: HOSPADM

## 2025-02-04 RX ORDER — PROPOFOL 10 MG/ML
INJECTION, EMULSION INTRAVENOUS
Status: DISCONTINUED | OUTPATIENT
Start: 2025-02-04 | End: 2025-02-04 | Stop reason: SDUPTHER

## 2025-02-04 RX ORDER — DEXAMETHASONE SODIUM PHOSPHATE 4 MG/ML
INJECTION, SOLUTION INTRA-ARTICULAR; INTRALESIONAL; INTRAMUSCULAR; INTRAVENOUS; SOFT TISSUE
Status: DISCONTINUED | OUTPATIENT
Start: 2025-02-04 | End: 2025-02-04 | Stop reason: SDUPTHER

## 2025-02-04 RX ORDER — SODIUM CHLORIDE, SODIUM LACTATE, POTASSIUM CHLORIDE, CALCIUM CHLORIDE 600; 310; 30; 20 MG/100ML; MG/100ML; MG/100ML; MG/100ML
INJECTION, SOLUTION INTRAVENOUS CONTINUOUS
Status: DISCONTINUED | OUTPATIENT
Start: 2025-02-04 | End: 2025-02-06 | Stop reason: HOSPADM

## 2025-02-04 RX ORDER — LIDOCAINE HYDROCHLORIDE 20 MG/ML
INJECTION, SOLUTION EPIDURAL; INFILTRATION; INTRACAUDAL; PERINEURAL
Status: DISCONTINUED | OUTPATIENT
Start: 2025-02-04 | End: 2025-02-04 | Stop reason: SDUPTHER

## 2025-02-04 RX ORDER — DIPHENHYDRAMINE HYDROCHLORIDE 50 MG/ML
25 INJECTION INTRAMUSCULAR; INTRAVENOUS EVERY 6 HOURS PRN
Status: DISCONTINUED | OUTPATIENT
Start: 2025-02-04 | End: 2025-02-06 | Stop reason: HOSPADM

## 2025-02-04 RX ORDER — CEFAZOLIN SODIUM 1 G/3ML
INJECTION, POWDER, FOR SOLUTION INTRAMUSCULAR; INTRAVENOUS
Status: DISCONTINUED | OUTPATIENT
Start: 2025-02-04 | End: 2025-02-04 | Stop reason: SDUPTHER

## 2025-02-04 RX ORDER — SODIUM CHLORIDE 0.9 % (FLUSH) 0.9 %
5-40 SYRINGE (ML) INJECTION EVERY 12 HOURS SCHEDULED
Status: DISCONTINUED | OUTPATIENT
Start: 2025-02-04 | End: 2025-02-06 | Stop reason: HOSPADM

## 2025-02-04 RX ORDER — ACETAMINOPHEN 325 MG/1
650 TABLET ORAL EVERY 6 HOURS
Status: DISCONTINUED | OUTPATIENT
Start: 2025-02-04 | End: 2025-02-06 | Stop reason: HOSPADM

## 2025-02-04 RX ORDER — DIPHENHYDRAMINE HCL 25 MG
25 TABLET ORAL EVERY 6 HOURS PRN
Status: DISCONTINUED | OUTPATIENT
Start: 2025-02-04 | End: 2025-02-06 | Stop reason: HOSPADM

## 2025-02-04 RX ORDER — TRANEXAMIC ACID 10 MG/ML
INJECTION, SOLUTION INTRAVENOUS
Status: DISCONTINUED | OUTPATIENT
Start: 2025-02-04 | End: 2025-02-04 | Stop reason: SDUPTHER

## 2025-02-04 RX ORDER — ONDANSETRON 4 MG/1
4 TABLET, ORALLY DISINTEGRATING ORAL EVERY 8 HOURS PRN
Status: DISCONTINUED | OUTPATIENT
Start: 2025-02-04 | End: 2025-02-06 | Stop reason: HOSPADM

## 2025-02-04 RX ORDER — MIDAZOLAM HYDROCHLORIDE 1 MG/ML
INJECTION, SOLUTION INTRAMUSCULAR; INTRAVENOUS
Status: DISCONTINUED | OUTPATIENT
Start: 2025-02-04 | End: 2025-02-04 | Stop reason: SDUPTHER

## 2025-02-04 RX ORDER — ROCURONIUM BROMIDE 10 MG/ML
INJECTION, SOLUTION INTRAVENOUS
Status: DISCONTINUED | OUTPATIENT
Start: 2025-02-04 | End: 2025-02-04 | Stop reason: SDUPTHER

## 2025-02-04 RX ORDER — SODIUM CHLORIDE 0.9 % (FLUSH) 0.9 %
5-40 SYRINGE (ML) INJECTION PRN
Status: DISCONTINUED | OUTPATIENT
Start: 2025-02-04 | End: 2025-02-06 | Stop reason: HOSPADM

## 2025-02-04 RX ORDER — CEFAZOLIN 2 G/1
INJECTION, POWDER, FOR SOLUTION INTRAMUSCULAR; INTRAVENOUS
Status: COMPLETED
Start: 2025-02-04 | End: 2025-02-04

## 2025-02-04 RX ORDER — VANCOMYCIN HYDROCHLORIDE 1 G/20ML
INJECTION, POWDER, LYOPHILIZED, FOR SOLUTION INTRAVENOUS PRN
Status: DISCONTINUED | OUTPATIENT
Start: 2025-02-04 | End: 2025-02-04 | Stop reason: ALTCHOICE

## 2025-02-04 RX ORDER — FAMOTIDINE 20 MG/1
20 TABLET, FILM COATED ORAL 2 TIMES DAILY
Status: DISCONTINUED | OUTPATIENT
Start: 2025-02-04 | End: 2025-02-06 | Stop reason: HOSPADM

## 2025-02-04 RX ORDER — SODIUM CHLORIDE 9 MG/ML
INJECTION, SOLUTION INTRAVENOUS
Status: DISCONTINUED | OUTPATIENT
Start: 2025-02-04 | End: 2025-02-04 | Stop reason: SDUPTHER

## 2025-02-04 RX ORDER — TRANEXAMIC ACID 10 MG/ML
1000 INJECTION, SOLUTION INTRAVENOUS
Status: ACTIVE | OUTPATIENT
Start: 2025-02-04 | End: 2025-02-05

## 2025-02-04 RX ORDER — FENTANYL CITRATE 50 UG/ML
INJECTION, SOLUTION INTRAMUSCULAR; INTRAVENOUS
Status: DISCONTINUED | OUTPATIENT
Start: 2025-02-04 | End: 2025-02-04 | Stop reason: SDUPTHER

## 2025-02-04 RX ORDER — ONDANSETRON 2 MG/ML
INJECTION INTRAMUSCULAR; INTRAVENOUS
Status: DISCONTINUED | OUTPATIENT
Start: 2025-02-04 | End: 2025-02-04 | Stop reason: SDUPTHER

## 2025-02-04 RX ORDER — TRANEXAMIC ACID 10 MG/ML
INJECTION, SOLUTION INTRAVENOUS
Status: COMPLETED
Start: 2025-02-04 | End: 2025-02-04

## 2025-02-04 RX ADMIN — ACETAMINOPHEN 650 MG: 325 TABLET ORAL at 18:53

## 2025-02-04 RX ADMIN — MIDAZOLAM 2 MG: 1 INJECTION INTRAMUSCULAR; INTRAVENOUS at 15:37

## 2025-02-04 RX ADMIN — FENTANYL CITRATE 50 MCG: 50 INJECTION, SOLUTION INTRAMUSCULAR; INTRAVENOUS at 15:47

## 2025-02-04 RX ADMIN — SODIUM CHLORIDE, POTASSIUM CHLORIDE, SODIUM LACTATE AND CALCIUM CHLORIDE: 600; 310; 30; 20 INJECTION, SOLUTION INTRAVENOUS at 18:54

## 2025-02-04 RX ADMIN — ARFORMOTEROL TARTRATE 15 MCG: 15 SOLUTION RESPIRATORY (INHALATION) at 18:35

## 2025-02-04 RX ADMIN — LIDOCAINE HYDROCHLORIDE 100 MG: 20 INJECTION, SOLUTION EPIDURAL; INFILTRATION; INTRACAUDAL; PERINEURAL at 15:47

## 2025-02-04 RX ADMIN — ROCURONIUM BROMIDE 50 MG: 10 INJECTION, SOLUTION INTRAVENOUS at 15:47

## 2025-02-04 RX ADMIN — FAMOTIDINE 20 MG: 20 TABLET, FILM COATED ORAL at 21:51

## 2025-02-04 RX ADMIN — FENTANYL CITRATE 50 MCG: 50 INJECTION, SOLUTION INTRAMUSCULAR; INTRAVENOUS at 16:17

## 2025-02-04 RX ADMIN — OXYCODONE HYDROCHLORIDE AND ACETAMINOPHEN 1 TABLET: 5; 325 TABLET ORAL at 09:17

## 2025-02-04 RX ADMIN — CEFAZOLIN 2 G: 1 INJECTION, POWDER, FOR SOLUTION INTRAMUSCULAR; INTRAVENOUS at 15:37

## 2025-02-04 RX ADMIN — SUGAMMADEX 200 MG: 100 INJECTION, SOLUTION INTRAVENOUS at 16:15

## 2025-02-04 RX ADMIN — IPRATROPIUM BROMIDE 0.5 MG: 0.5 SOLUTION RESPIRATORY (INHALATION) at 12:16

## 2025-02-04 RX ADMIN — DEXAMETHASONE SODIUM PHOSPHATE 10 MG: 4 INJECTION, SOLUTION INTRAMUSCULAR; INTRAVENOUS at 16:03

## 2025-02-04 RX ADMIN — SODIUM CHLORIDE, PRESERVATIVE FREE 10 ML: 5 INJECTION INTRAVENOUS at 09:21

## 2025-02-04 RX ADMIN — OXYCODONE HYDROCHLORIDE AND ACETAMINOPHEN 1 TABLET: 5; 325 TABLET ORAL at 13:21

## 2025-02-04 RX ADMIN — ONDANSETRON 4 MG: 2 INJECTION, SOLUTION INTRAMUSCULAR; INTRAVENOUS at 16:03

## 2025-02-04 RX ADMIN — PROPOFOL 150 MG: 10 INJECTION, EMULSION INTRAVENOUS at 15:47

## 2025-02-04 RX ADMIN — DOCUSATE SODIUM 50 MG AND SENNOSIDES 8.6 MG 1 TABLET: 8.6; 5 TABLET, FILM COATED ORAL at 21:51

## 2025-02-04 RX ADMIN — TRANEXAMIC ACID 1000 MG: 10 INJECTION, SOLUTION INTRAVENOUS at 15:37

## 2025-02-04 RX ADMIN — SODIUM CHLORIDE: 9 INJECTION, SOLUTION INTRAVENOUS at 15:42

## 2025-02-04 RX ADMIN — ASPIRIN 81 MG: 81 TABLET, COATED ORAL at 21:51

## 2025-02-04 RX ADMIN — IPRATROPIUM BROMIDE 0.5 MG: 0.5 SOLUTION RESPIRATORY (INHALATION) at 18:35

## 2025-02-04 RX ADMIN — ATORVASTATIN CALCIUM 40 MG: 40 TABLET, FILM COATED ORAL at 21:50

## 2025-02-04 RX ADMIN — IPRATROPIUM BROMIDE 0.5 MG: 0.5 SOLUTION RESPIRATORY (INHALATION) at 07:44

## 2025-02-04 RX ADMIN — ARFORMOTEROL TARTRATE 15 MCG: 15 SOLUTION RESPIRATORY (INHALATION) at 07:44

## 2025-02-04 RX ADMIN — OXYCODONE HYDROCHLORIDE AND ACETAMINOPHEN 1 TABLET: 5; 325 TABLET ORAL at 21:50

## 2025-02-04 ASSESSMENT — PAIN DESCRIPTION - LOCATION
LOCATION: HIP

## 2025-02-04 ASSESSMENT — PAIN SCALES - GENERAL
PAINLEVEL_OUTOF10: 6
PAINLEVEL_OUTOF10: 10
PAINLEVEL_OUTOF10: 8
PAINLEVEL_OUTOF10: 6
PAINLEVEL_OUTOF10: 0

## 2025-02-04 ASSESSMENT — PAIN DESCRIPTION - ORIENTATION
ORIENTATION: RIGHT

## 2025-02-04 ASSESSMENT — LIFESTYLE VARIABLES: SMOKING_STATUS: 1

## 2025-02-04 ASSESSMENT — PAIN DESCRIPTION - DESCRIPTORS
DESCRIPTORS: ACHING;SORE;THROBBING
DESCRIPTORS: SORE;PRESSURE
DESCRIPTORS: ACHING

## 2025-02-04 ASSESSMENT — PAIN - FUNCTIONAL ASSESSMENT: PAIN_FUNCTIONAL_ASSESSMENT: ACTIVITIES ARE NOT PREVENTED

## 2025-02-04 NOTE — PROGRESS NOTES
Spiritual Health History and Assessment/Progress Note  [facility]    Initial Encounter, Attempted Encounter,  ,  ,      Name: Krista Johnson MRN: [id number]    Age: 69 y.o.     Sex: female   Language: English   Advent: Zoroastrianism   Hip dislocation, right, initial encounter ([facility])     Date: 2/4/2025                           Spiritual Assessment began in [facility] MED SURG        Referral/Consult From: Rounding   Encounter Overview/Reason: Initial Encounter, Attempted Encounter  Service Provided For: Patient    Shayla, Belief, Meaning:   Patient unable to assess at this time  Family/Friends No family/friends present      Importance and Influence:  Patient unable to assess at this time  Family/Friends No family/friends present    Community:  Patient feels well-supported. Support system includes: Extended family  Family/Friends No family/friends present    Assessment and Plan of Care:     Patient Interventions include: Other: Patient at rest and appears peaceful, did not disturb. Prayer is offered for the patient at the time.  Family/Friends Interventions include: No family/friends present    Patient Plan of Care: Spiritual Care available upon further referral  Family/Friends Plan of Care: No family/friends present    Electronically signed by Chaplain Eboni on 2/4/2025 at 2:08 PM

## 2025-02-04 NOTE — PROGRESS NOTES
Notified GLYNN Whitehead regarding pt having DNR paperwork at home but pt unsure of specifications, pt's family will bring in paperwork today,
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
## 2025-02-04 NOTE — PROGRESS NOTES
MEPILEX APPLIED TO COCCYX    Rest, ice and elevate areas of pain as needed for pain and swelling.   Wear the finger splint as needed.  Follow up with PCP and orthopaedics as discussed.  Return to the ER for worst headache of your life, persistent vomiting, as needed.

## 2025-02-04 NOTE — CARE COORDINATION
Met with patient about diagnosis and discharge plan of care. Pt admit for right hip dislocation. Pt just discharged post right hip arthroplasty after displaced fracture. Plan for repeat OR today. Pt lives home home alone. Pt has toilet riser with arms, wheeled walker. Active with [facility]-confirmed and JEFF[facility] orders completed. Pt only wants home at this time. PCP is Dr Morris. Will follow-o

## 2025-02-04 NOTE — ED NOTES
ED to Inpatient Handoff Report    Notified Zuri that electronic handoff available and patient ready for transport to room 727.    Safety Risks: Home safety issues, Difficulty with daily activities, and Risk of falls    Patient in Restraints: no    Constant Observer or Patient : no    Telemetry Monitoring Ordered :No      Cardiac Rhythm: Sinus rhythm    Order to transfer to unit without monitor:YES    Last MEWS: 2 Time completed: 2052amanda    Deterioration Index Score:   Predictive Model Details          26 (Normal)  Factor Value    Calculated 2/3/2025 20:53 44% Age 69 years old    Deterioration Index Model 27% Respiratory rate 21     13% WBC count abnormal (14.3 k/uL)     7% Pulse oximetry 92 %     5% Potassium 4.2 mmol/L     2% Sodium 141 mmol/L     1% Systolic 116     1% Hematocrit 43.2 %     0% Pulse 67     0% Temperature 98.8 °F (37.1 °C)        Vitals:    02/03/25 1909 02/03/25 2000 02/03/25 2038 02/03/25 2052   BP:  (!) 149/69  (!) 116/57   Pulse:  69 66 67   Resp: 20 21 19 21   Temp:    98.8 °F (37.1 °C)   TempSrc:       SpO2:  (!) 86% 90% 92%   Weight:       Height:             Opportunity for questions and clarification was provided.

## 2025-02-04 NOTE — ANESTHESIA PRE PROCEDURE
8.4 02/04/2025 04:45 AM    BILITOT 0.2 02/04/2025 04:45 AM    ALKPHOS 42 02/04/2025 04:45 AM    AST 22 02/04/2025 04:45 AM    ALT 24 02/04/2025 04:45 AM       POC Tests: No results for input(s): \"POCGLU\", \"POCNA\", \"POCK\", \"POCCL\", \"POCBUN\", \"POCHEMO\", \"POCHCT\" in the last 72 hours.    Coags:   Lab Results   Component Value Date/Time    PROTIME 10.9 02/03/2025 06:43 PM    INR 1.0 02/03/2025 06:43 PM    APTT 30.9 02/03/2025 06:43 PM       HCG (If Applicable): No results found for: \"PREGTESTUR\", \"PREGSERUM\", \"HCG\", \"HCGQUANT\"     ABGs: No results found for: \"PHART\", \"PO2ART\", \"NKS5NUS\", \"KKM5JNZ\", \"BEART\", \"H5HOERDD\"     Type & Screen (If Applicable):  No results found for: \"ABORH\", \"LABANTI\"    Drug/Infectious Status (If Applicable):  No results found for: \"HIV\", \"HEPCAB\"    COVID-19 Screening (If Applicable): No results found for: \"COVID19\"        Anesthesia Evaluation    Airway: Mallampati: II  TM distance: >3 FB   Neck ROM: full  Mouth opening: > = 3 FB   Dental:          Pulmonary:   (+)  COPD:      decreased breath sounds    current smoker                           Cardiovascular:    (+) hyperlipidemia                  Neuro/Psych:   (+) psychiatric history:depression/anxiety             GI/Hepatic/Renal:             Endo/Other:    (+) Diabetes (A1C 6.3)Type II DM, : arthritis:..                 Abdominal:             Vascular:   + PVD, aortic or cerebral.       Other Findings:       Anesthesia Plan      general     ASA 3       Induction: intravenous.    MIPS: Postoperative opioids intended and Prophylactic antiemetics administered.  Anesthetic plan and risks discussed with patient.      Plan discussed with AARON.                Meri Church DO   2/4/2025

## 2025-02-04 NOTE — PROGRESS NOTES
4 Eyes Skin Assessment     NAME:  Krista Johnson  YOB: 1955  MEDICAL RECORD NUMBER:  [id number]    The patient is being assessed for  Admission    I agree that at least one RN has performed a thorough Head to Toe Skin Assessment on the patient. ALL assessment sites listed below have been assessed.      Areas assessed by both nurses:    Head, Face, Ears, Shoulders, Back, Chest, Arms, Elbows, Hands, Sacrum. Buttock, Coccyx, Ischium, Legs. Feet and Heels, and Under Medical Devices         Does the Patient have a Wound? No noted wound(s)       Andrea Prevention initiated by RN: No  Wound Care Orders initiated by RN: No    Pressure Injury (Stage 3,4, Unstageable, DTI, NWPT, and Complex wounds) if present, place Wound referral order by RN under : No    New Ostomies, if present place, Ostomy referral order under : No     Nurse 1 eSignature: Electronically signed by Norberto Yeung RN on 2/4/25 at 2:15 AM EST    **SHARE this note so that the co-signing nurse can place an eSignature**    Nurse 2 eSignature: Electronically signed by Iris Zafar RN on 2/4/25 at 2:19 AM EST

## 2025-02-04 NOTE — ACP (ADVANCE CARE PLANNING)
Advance Care Planning   Healthcare Decision Maker:    Primary Decision Maker: Tracey Hemphill - Niece/Nephew - [phone]    Click here to complete Healthcare Decision Makers including selection of the Healthcare Decision Maker Relationship (ie \"Primary\").

## 2025-02-04 NOTE — BRIEF OP NOTE
Brief Postoperative Note      Patient: Krista Johnson  YOB: 1955  MRN: [id number]    Date of Procedure: 2/4/2025    Pre-Op Diagnosis Codes:      * Hip dislocation, right, initial encounter ([facility]) [S73.004A]    Post-Op Diagnosis: Same       Procedure(s):  OPEN REDUCTION RIGHT HIP DISLOCATION      ++MIRELA++ with acetabular revision - limited (reduced services)    Surgeon(s):  Chepe Rothman MD    Assistant:  Physician Assistant: Leanna Sawant PA-C    Anesthesia: General    Estimated Blood Loss (mL): less than 100     Complications: None    Specimens:   ID Type Source Tests Collected by Time Destination   B : EXPLANTED HARDWARE RIGHT HIP Hardware Hardware SURGICAL PATHOLOGY Chepe Rothman MD 2/4/2025 1622        Implants:  Implant Name Type Inv. Item Serial No.  Lot No. LRB No. Used Action   HEAD FEM [id number] NK L+7MM 12 14 TAPR HIP BIOLOX DELT OPT - [id number]  HEAD FEM [id number] NK L+7MM 12 14 TAPR HIP BIOLOX DELT OPT  [employer]- [id number] Right 1 Implanted   BEARING VIVACIT-E DM [id number] - [id number]  BEARING VIVACIT-E DM [id number]  [employer]-WD [id number] Right 1 Implanted         Drains:   [REMOVED] External Urinary Catheter (Removed)   Site Assessment Clean,dry & intact 01/03/25 2244   Placement Initiated 01/02/25 2200   Catheter Care Catheter/Wick replaced;Suction Canister/Tubing changed 01/02/25 2200   Perineal Care Yes 01/02/25 2200   Suction 40 mmgHg continuous 01/02/25 2200   Urine Color Yellow 01/02/25 2200   Urine Appearance Clear 01/02/25 2200   Output (mL) 1100 mL 01/03/25 0533       Findings:  Infection Present At Time Of Surgery (PATOS) (choose all levels that have infection present):  No infection present  Other Findings: Well aligned, well fixed implants    Electronically signed by Chepe Rothman MD on 2/4/2025 at 4:45 PM

## 2025-02-04 NOTE — PLAN OF CARE
Problem: Chronic Conditions and Co-morbidities  Goal: Patient's chronic conditions and co-morbidity symptoms are monitored and maintained or improved  Outcome: Progressing     Problem: Discharge Planning  Goal: Discharge to home or other facility with appropriate resources  Outcome: Progressing     Problem: Pain  Goal: Verbalizes/displays adequate comfort level or baseline comfort level  Outcome: Progressing     Problem: Skin/Tissue Integrity  Goal: Skin integrity remains intact  Description: 1.  Monitor for areas of redness and/or skin breakdown  2.  Assess vascular access sites hourly  3.  Every 4-6 hours minimum:  Change oxygen saturation probe site  4.  Every 4-6 hours:  If on nasal continuous positive airway pressure, respiratory therapy assess nares and determine need for appliance change or resting period  Outcome: Progressing     Problem: Safety - Adult  Goal: Free from fall injury  Outcome: Progressing     Problem: ABCDS Injury Assessment  Goal: Absence of physical injury  Outcome: Progressing

## 2025-02-04 NOTE — H&P
noted  Abdomen: soft, non-tender, nondistended, normal bowel sounds  Extremities: Pain noted to palpation of the right hip, right leg is shortened  Skin: Warm and dry, no rashes noted  Neurologic: Following commands, speech is clear    Labs-   Lab Results   Component Value Date    WBC 14.4 (H) 02/04/2025    HGB 12.7 02/04/2025    HCT 40.8 02/04/2025     02/04/2025     02/04/2025    K 3.6 02/04/2025     (H) 02/04/2025    CREATININE 0.5 02/04/2025    BUN 12 02/04/2025    CO2 21 (L) 02/04/2025    GLUCOSE 97 02/04/2025    ALT 24 02/04/2025    AST 22 02/04/2025    INR 1.0 02/03/2025    APTT 30.9 02/03/2025       Lab Results   Component Value Date    TROPHS 12 (H) 01/02/2025        Lab Results   Component Value Date    TSH 2.100 08/05/2022       Lab Results   Component Value Date    MG 2.1 01/06/2025       Recent Radiological Studies:  XR HIP RIGHT (1 VIEW)   Final Result   Redemonstration of superiorly dislocated femoral component in relation to the   acetabular component of right hip hemiarthroplasty, post 1st attempt at   closed reduction.         Vascular duplex lower extremity venous right   Final Result   No evidence of DVT in the right lower extremity.         XR HIP 2-3 VW W PELVIS RIGHT   Final Result   Dislocation of the right prosthetic hip posteriorly.      IMPRESSION:   Post reduction views.             Assessment  Active Hospital Problems    Diagnosis     Lumbar spondylolysis [M43.06]      Priority: Medium    PAD (peripheral artery disease) ([facility]) [I73.9]      Priority: Medium    HLD (hyperlipidemia) [E78.5]      Priority: Medium    Tobacco use [Z72.0]      Priority: Medium    COPD (chronic obstructive pulmonary disease) ([facility]) [J44.9]     Hip dislocation, right, initial encounter ([facility]) [S73.004A]     Closed displaced fracture of right femoral neck ([facility]) [S72.001A]     Centrilobular emphysema ([facility]) [J43.2]     Major depressive disorder, recurrent, mild [F33.0]     Tobacco dependence

## 2025-02-04 NOTE — PROGRESS NOTES
Physical Therapy  Facility/Department: [address] INTERMEDIATE MED SURG    Name: Krista Johnson  : 1955  MRN: [id number]  Date of Service: 2025    PT consult was received and eval was attempted.  After reviewing the chart pt has dislocation of R BASHIR and is expected to go to surgery today for reduction.  Will re-attempt PT eval another time.    Jake Almeida Jr., P.T.  License Number: [id number]

## 2025-02-05 LAB
ALBUMIN SERPL-MCNC: 3.4 G/DL (ref 3.5–5.2)
ALP SERPL-CCNC: 75 U/L (ref 35–104)
ALT SERPL-CCNC: 62 U/L (ref 0–32)
ANION GAP SERPL CALCULATED.3IONS-SCNC: 14 MMOL/L (ref 7–16)
AST SERPL-CCNC: 45 U/L (ref 0–31)
BASOPHILS # BLD: 0.03 K/UL (ref 0–0.2)
BASOPHILS NFR BLD: 0 % (ref 0–2)
BILIRUB SERPL-MCNC: 0.2 MG/DL (ref 0–1.2)
BUN SERPL-MCNC: 13 MG/DL (ref 6–23)
CALCIUM SERPL-MCNC: 8.8 MG/DL (ref 8.6–10.2)
CHLORIDE SERPL-SCNC: 101 MMOL/L (ref 98–107)
CO2 SERPL-SCNC: 21 MMOL/L (ref 22–29)
CREAT SERPL-MCNC: 0.5 MG/DL (ref 0.5–1)
EOSINOPHIL # BLD: 0 K/UL (ref 0.05–0.5)
EOSINOPHILS RELATIVE PERCENT: 0 % (ref 0–6)
ERYTHROCYTE [DISTWIDTH] IN BLOOD BY AUTOMATED COUNT: 14 % (ref 11.5–15)
GFR, ESTIMATED: >90 ML/MIN/1.73M2
GLUCOSE SERPL-MCNC: 252 MG/DL (ref 74–99)
HCT VFR BLD AUTO: 41.2 % (ref 34–48)
HGB BLD-MCNC: 13.2 G/DL (ref 11.5–15.5)
IMM GRANULOCYTES # BLD AUTO: 0.09 K/UL (ref 0–0.58)
IMM GRANULOCYTES NFR BLD: 1 % (ref 0–5)
LYMPHOCYTES NFR BLD: 1.08 K/UL (ref 1.5–4)
LYMPHOCYTES RELATIVE PERCENT: 7 % (ref 20–42)
MCH RBC QN AUTO: 29.9 PG (ref 26–35)
MCHC RBC AUTO-ENTMCNC: 32 G/DL (ref 32–34.5)
MCV RBC AUTO: 93.2 FL (ref 80–99.9)
MONOCYTES NFR BLD: 0.23 K/UL (ref 0.1–0.95)
MONOCYTES NFR BLD: 2 % (ref 2–12)
NEUTROPHILS NFR BLD: 91 % (ref 43–80)
NEUTS SEG NFR BLD: 13.84 K/UL (ref 1.8–7.3)
PLATELET # BLD AUTO: 355 K/UL (ref 130–450)
PMV BLD AUTO: 10.1 FL (ref 7–12)
POTASSIUM SERPL-SCNC: 4 MMOL/L (ref 3.5–5)
PROT SERPL-MCNC: 6.3 G/DL (ref 6.4–8.3)
RBC # BLD AUTO: 4.42 M/UL (ref 3.5–5.5)
SODIUM SERPL-SCNC: 136 MMOL/L (ref 132–146)
WBC OTHER # BLD: 15.3 K/UL (ref 4.5–11.5)

## 2025-02-05 PROCEDURE — 97161 PT EVAL LOW COMPLEX 20 MIN: CPT

## 2025-02-05 PROCEDURE — 6370000000 HC RX 637 (ALT 250 FOR IP): Performed by: ORTHOPAEDIC SURGERY

## 2025-02-05 PROCEDURE — 94640 AIRWAY INHALATION TREATMENT: CPT

## 2025-02-05 PROCEDURE — 97530 THERAPEUTIC ACTIVITIES: CPT

## 2025-02-05 PROCEDURE — 2500000003 HC RX 250 WO HCPCS: Performed by: ORTHOPAEDIC SURGERY

## 2025-02-05 PROCEDURE — G0378 HOSPITAL OBSERVATION PER HR: HCPCS

## 2025-02-05 PROCEDURE — 6360000002 HC RX W HCPCS: Performed by: ORTHOPAEDIC SURGERY

## 2025-02-05 PROCEDURE — 36415 COLL VENOUS BLD VENIPUNCTURE: CPT

## 2025-02-05 PROCEDURE — 97165 OT EVAL LOW COMPLEX 30 MIN: CPT

## 2025-02-05 PROCEDURE — 80053 COMPREHEN METABOLIC PANEL: CPT

## 2025-02-05 PROCEDURE — 97535 SELF CARE MNGMENT TRAINING: CPT

## 2025-02-05 PROCEDURE — 85025 COMPLETE CBC W/AUTO DIFF WBC: CPT

## 2025-02-05 RX ORDER — SENNOSIDES A AND B 8.6 MG/1
1 TABLET, FILM COATED ORAL DAILY PRN
Qty: 30 TABLET | Refills: 0 | Status: SHIPPED | OUTPATIENT
Start: 2025-02-05 | End: 2025-03-07

## 2025-02-05 RX ADMIN — ACETAMINOPHEN 650 MG: 325 TABLET ORAL at 00:10

## 2025-02-05 RX ADMIN — IPRATROPIUM BROMIDE 0.5 MG: 0.5 SOLUTION RESPIRATORY (INHALATION) at 21:53

## 2025-02-05 RX ADMIN — DOCUSATE SODIUM 50 MG AND SENNOSIDES 8.6 MG 1 TABLET: 8.6; 5 TABLET, FILM COATED ORAL at 20:03

## 2025-02-05 RX ADMIN — FAMOTIDINE 20 MG: 20 TABLET, FILM COATED ORAL at 20:04

## 2025-02-05 RX ADMIN — SODIUM CHLORIDE, PRESERVATIVE FREE 10 ML: 5 INJECTION INTRAVENOUS at 20:04

## 2025-02-05 RX ADMIN — OXYCODONE HYDROCHLORIDE AND ACETAMINOPHEN 1 TABLET: 5; 325 TABLET ORAL at 08:31

## 2025-02-05 RX ADMIN — WATER 2000 MG: 1 INJECTION INTRAMUSCULAR; INTRAVENOUS; SUBCUTANEOUS at 00:10

## 2025-02-05 RX ADMIN — ATORVASTATIN CALCIUM 40 MG: 40 TABLET, FILM COATED ORAL at 20:03

## 2025-02-05 RX ADMIN — ARFORMOTEROL TARTRATE 15 MCG: 15 SOLUTION RESPIRATORY (INHALATION) at 08:00

## 2025-02-05 RX ADMIN — IPRATROPIUM BROMIDE 0.5 MG: 0.5 SOLUTION RESPIRATORY (INHALATION) at 00:00

## 2025-02-05 RX ADMIN — ASPIRIN 81 MG: 81 TABLET, COATED ORAL at 08:32

## 2025-02-05 RX ADMIN — IPRATROPIUM BROMIDE 0.5 MG: 0.5 SOLUTION RESPIRATORY (INHALATION) at 08:00

## 2025-02-05 RX ADMIN — DOCUSATE SODIUM 50 MG AND SENNOSIDES 8.6 MG 1 TABLET: 8.6; 5 TABLET, FILM COATED ORAL at 08:31

## 2025-02-05 RX ADMIN — FAMOTIDINE 20 MG: 20 TABLET, FILM COATED ORAL at 08:32

## 2025-02-05 RX ADMIN — WATER 2000 MG: 1 INJECTION INTRAMUSCULAR; INTRAVENOUS; SUBCUTANEOUS at 07:19

## 2025-02-05 RX ADMIN — ARFORMOTEROL TARTRATE 15 MCG: 15 SOLUTION RESPIRATORY (INHALATION) at 21:53

## 2025-02-05 RX ADMIN — OXYCODONE HYDROCHLORIDE AND ACETAMINOPHEN 1 TABLET: 5; 325 TABLET ORAL at 18:12

## 2025-02-05 RX ADMIN — ASPIRIN 81 MG: 81 TABLET, COATED ORAL at 20:03

## 2025-02-05 RX ADMIN — IPRATROPIUM BROMIDE 0.5 MG: 0.5 SOLUTION RESPIRATORY (INHALATION) at 12:37

## 2025-02-05 RX ADMIN — OXYCODONE HYDROCHLORIDE AND ACETAMINOPHEN 1 TABLET: 5; 325 TABLET ORAL at 12:46

## 2025-02-05 RX ADMIN — OXYCODONE HYDROCHLORIDE AND ACETAMINOPHEN 1 TABLET: 5; 325 TABLET ORAL at 04:14

## 2025-02-05 RX ADMIN — GABAPENTIN 300 MG: 300 CAPSULE ORAL at 08:31

## 2025-02-05 ASSESSMENT — PAIN SCALES - GENERAL
PAINLEVEL_OUTOF10: 8
PAINLEVEL_OUTOF10: 9
PAINLEVEL_OUTOF10: 9
PAINLEVEL_OUTOF10: 8
PAINLEVEL_OUTOF10: 9

## 2025-02-05 ASSESSMENT — PAIN - FUNCTIONAL ASSESSMENT: PAIN_FUNCTIONAL_ASSESSMENT: ACTIVITIES ARE NOT PREVENTED

## 2025-02-05 ASSESSMENT — PAIN DESCRIPTION - DESCRIPTORS
DESCRIPTORS: ACHING;SORE;THROBBING
DESCRIPTORS: ACHING;SORE;THROBBING

## 2025-02-05 ASSESSMENT — PAIN DESCRIPTION - ORIENTATION
ORIENTATION: RIGHT
ORIENTATION: RIGHT

## 2025-02-05 ASSESSMENT — PAIN DESCRIPTION - LOCATION
LOCATION: HIP
LOCATION: HIP

## 2025-02-05 NOTE — ANESTHESIA POSTPROCEDURE EVALUATION
Department of Anesthesiology  Postprocedure Note    Patient: Krista Johnson  MRN: [id number]  YOB: 1955  Date of evaluation: 2/4/2025    Procedure Summary       Date: 02/04/25 Room / Location: [address]    Anesthesia Start: 1537 Anesthesia Stop: 1707    Procedure: OPEN REDUCTION RIGHT HIP DISLOCATION      ++MIRELA++ (Right: Hip) Diagnosis:       Hip dislocation, right, initial encounter (HCC)      (Hip dislocation, right, initial encounter (HCC) [S73.004A])    Surgeons: Chepe Rothman MD Responsible Provider: Meri Church DO    Anesthesia Type: general ASA Status: 3            Anesthesia Type: No value filed.    David Phase I: David Score: 9    David Phase II:      Anesthesia Post Evaluation    Patient location during evaluation: PACU  Patient participation: complete - patient participated  Level of consciousness: awake  Airway patency: patent  Nausea & Vomiting: no nausea and no vomiting  Cardiovascular status: hemodynamically stable  Respiratory status: acceptable  Hydration status: stable  Pain management: adequate    No notable events documented.
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
## 2025-02-05 NOTE — CARE COORDINATION
Updated plan of care. POD#1 ORIF of hip dislocation. Awaiting therapies. Pt wants home, ONLY. [facility] active with pt and JEFFY completed. Pt has all DME. Will follow-o

## 2025-02-05 NOTE — PROGRESS NOTES
Internal Medicine Progress Note    Patient's name: Krista Johnson  : 1955  Chief complaints (on day of admission): Hip Pain (Right hip pain, had hip surgery two weeks ago and not reports unable to put any weight on that leg.)  Admission date: 2/3/2025  Date of service: 2025   Room: [address]  Primary care physician: Bon Morris DO  Reason for visit: Follow-up for right hip dislocation    Subjective  Krista is seen lying in bed asleep in no distress. She does easily awaken to voice. Reports being tired but otherwise feels well. Does have some surgical pain but otherwise pain is much improved. She reports that she did go for a walk with therapy earlier this morning and did well, hoping to go home. She denies any nausea or vomiting. Denies any shortness of breath or difficulty breathing. No other issues or concerns from nursing.    Review of Systems  Full 10 point review of systems negative unless mentioned above.    Hospital Medications  Current Facility-Administered Medications   Medication Dose Route Frequency Provider Last Rate Last Admin    acetaminophen (TYLENOL) tablet 1,000 mg  1,000 mg Oral Once Leanna Sawant PA-C        dexAMETHasone (PF) (DECADRON) injection 8 mg  8 mg IntraVENous Once Leanna Sawant PA-SHANNON        lactated ringers infusion   IntraVENous Continuous Leanna Sawant PA-SHANNON        sodium chloride flush 0.9 % injection 5-40 mL  5-40 mL IntraVENous 2 times per day Leanna Sawant PA-C        sodium chloride flush 0.9 % injection 5-40 mL  5-40 mL IntraVENous PRN Leanna Sawant PA-C        0.9 % sodium chloride infusion   IntraVENous PRN Leanna Sawant, PA-C        sodium chloride flush 0.9 % injection 5-40 mL  5-40 mL IntraVENous 2 times per day Chepe Rothman MD        sodium chloride flush 0.9 % injection 5-40 mL  5-40 mL IntraVENous PRN Chepe Rothman MD        0.9 % sodium chloride infusion   IntraVENous PRN Chepe Rothman MD        acetaminophen (TYLENOL)

## 2025-02-05 NOTE — PROGRESS NOTES
[facility] Quality Flow/Interdisciplinary Rounds Progress Note        Quality Flow Rounds held on February 5, 2025    Disciplines Attending:  Bedside Nurse, , , Nursing Unit Leadership, and      Krista BONILLA Alex was admitted on 2/3/2025  2:58 PM    Anticipated Discharge Date:   2/5/2025    Disposition: Home with home health care    Andrea Score:  Andrea Scale Score: 18    [facility] RISK OF UNPLANNED READMISSION 2.0             0 Total Score        Discussed patient goal for the day, patient clinical progression, and barriers to discharge.  The following Goal(s) of the Day/Commitment(s) have been identified:  Occupational Therapy - Obtain Consult Order and Physical Therapy - Obtain Consult Order      Hanna Gray RN  February 5, 2025

## 2025-02-05 NOTE — PROGRESS NOTES
Occupational Therapy    OCCUPATIONAL THERAPY INITIAL EVALUATION    [facility]   [address], [place]         Date:2025                                                  Patient Name: Krista Johnson    MRN: [id number]    : 1955    Room: [address] [place] [address]      Evaluating OT: Marisa Jewell OTR/L   [id number]      Referring Provider:Chepe Rothman MD     Specific Provider Orders/Date:OT eval and treat 2025      Diagnosis:  Hip dislocation, right, initial encounter ([facility]) [S73.004A]  Closed posterior dislocation of right hip, initial encounter ([facility]) [S73.014A]    Procedure: OPEN REDUCTION RIGHT HIP DISLOCATION      ++MIRELA++ (Right: Hip)      Pertinent Medical History: R BASHIR 2025, chronic pain, asthma, 1st  and 2nd digit toe amputations L foot,      Precautions:  Fall Risk, FWBAT R LE, posterolateral hip precautions     Assessment of current deficits    [x] Functional mobility  [x]ADLs  [x] Strength               [x]Cognition    [x] Functional transfers   [x] IADLs         [x] Safety Awareness   [x]Endurance    [] Fine Coordination              [x] Balance      [] Vision/perception   []Sensation     []Gross Motor Coordination  [] ROM  [] Delirium                   [] Motor Control     OT PLAN OF CARE   OT POC based on physician orders, patient diagnosis and results of clinical assessment    Frequency/Duration  1-3 days/wk for  PRN   Specific OT Treatment Interventions to include:   ADL retraining/adapted techniques and AE recommendations to increase functional independence within precautions                    Energy conservation techniques to improve tolerance for selfcare routine   Functional transfer/mobility training/DME recommendations for increased independence, safety and fall prevention         Patient/family education to increase safety and functional independence             Environmental modifications for safe mobility and completion of

## 2025-02-05 NOTE — PLAN OF CARE
Problem: Chronic Conditions and Co-morbidities  Goal: Patient's chronic conditions and co-morbidity symptoms are monitored and maintained or improved  2/5/2025 1109 by Carmencita Feliciano RN  Outcome: Progressing     Problem: Discharge Planning  Goal: Discharge to home or other facility with appropriate resources  2/5/2025 1109 by Carmencita Feliciano RN  Outcome: Progressing     Problem: Pain  Goal: Verbalizes/displays adequate comfort level or baseline comfort level  2/5/2025 1109 by Carmencita Feliciano RN  Outcome: Progressing     Problem: Skin/Tissue Integrity  Goal: Skin integrity remains intact  Description: 1.  Monitor for areas of redness and/or skin breakdown  2.  Assess vascular access sites hourly  3.  Every 4-6 hours minimum:  Change oxygen saturation probe site  4.  Every 4-6 hours:  If on nasal continuous positive airway pressure, respiratory therapy assess nares and determine need for appliance change or resting period  2/5/2025 1109 by Carmencita Feliciano RN  Outcome: Progressing     Problem: Safety - Adult  Goal: Free from fall injury  2/5/2025 1109 by Carmencita Feliciano RN  Outcome: Progressing     Problem: ABCDS Injury Assessment  Goal: Absence of physical injury  2/5/2025 1109 by Carmencita Feliciano RN  Outcome: Progressing

## 2025-02-05 NOTE — PROGRESS NOTES
CLINICAL PHARMACY NOTE: MEDS TO BEDS    Total # of Prescriptions Filled: 1   The following medications were delivered to the patient:  Senna 8.6 mg    Additional Documentation:

## 2025-02-05 NOTE — OP NOTE
[place] [address]                            OPERATIVE REPORT      PATIENT NAME: JARRED BHAKTA              : 1955  MED REC NO: [id number]                        ROOM: [facility]  ACCOUNT NO: [id number]                       ADMIT DATE: 2025  PROVIDER: Chepe Rothman MD      DATE OF PROCEDURE:  2025    SURGEON:  Chepe Rothman MD    PREOPERATIVE DIAGNOSIS:  Right total hip posterior prosthetic hip dislocation.    POSTOPERATIVE DIAGNOSIS:  Right total hip posterior prosthetic hip dislocation.    PROCEDURE:  Open reduction, right hip posterior prosthetic hip dislocation with limited acetabular revision.    ASSISTANT:  Leanna Sawant PA-C.  No qualified surgical resident available.    ANESTHESIA:  General.    ESTIMATED BLOOD LOSS:  100.    SPECIMENS:  Explanted femoral head and dual mobility liner.    COMPLICATIONS:  None.    CONDITION:  Stable to recovery room.    IMPLANTS:  Ethel Biolox ceramic 28 mm femoral head, +7 mm neck with a 42 mm all-polyethylene dual mobility bearing for a size 52 mm acetabular shell.    INDICATIONS:  This patient is a 69-year-old woman about 4 to 5 weeks status post right hip replacement for femoral neck fracture.  Presented to the ER yesterday with a posterior hip dislocation.  No evidence of periprosthetic fracture.  Attempted closed reduction in the ER was unsuccessful.  Given the type of implant, I recommended an open reduction in the operating room.  The risks, benefits, alternatives, limitations to the surgical procedure  were discussed and informed consent obtained.    DESCRIPTION OF PROCEDURE:  The patient met in preoperative holding area.  Right hip was marked as correct operative site.  She was taken to the operating room where general anesthesia was administered.  Intravenous antibiotics and TXA were given per protocol.  She was positioned on her left side, secured on a

## 2025-02-05 NOTE — PROGRESS NOTES
RN call out to Dr. Mccormick's office re: clarify if patient is ok to d/c from ortho point of view.     Leanna returned the call and relayed that Dr. Mccormick will be in later today as Dr. Mccormick wanted patient to stay overnight and discharge tommrow.
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
## 2025-02-06 VITALS
RESPIRATION RATE: 18 BRPM | WEIGHT: 129 LBS | BODY MASS INDEX: 17.47 KG/M2 | DIASTOLIC BLOOD PRESSURE: 55 MMHG | HEART RATE: 64 BPM | TEMPERATURE: 97.5 F | OXYGEN SATURATION: 95 % | SYSTOLIC BLOOD PRESSURE: 139 MMHG | HEIGHT: 72 IN

## 2025-02-06 LAB
ALBUMIN SERPL-MCNC: 3.4 G/DL (ref 3.5–5.2)
ALP SERPL-CCNC: 85 U/L (ref 35–104)
ALT SERPL-CCNC: 33 U/L (ref 0–32)
ANION GAP SERPL CALCULATED.3IONS-SCNC: 11 MMOL/L (ref 7–16)
AST SERPL-CCNC: 21 U/L (ref 0–31)
BASOPHILS # BLD: 0.08 K/UL (ref 0–0.2)
BASOPHILS NFR BLD: 0 % (ref 0–2)
BILIRUB SERPL-MCNC: 0.2 MG/DL (ref 0–1.2)
BUN SERPL-MCNC: 13 MG/DL (ref 6–23)
CALCIUM SERPL-MCNC: 8.9 MG/DL (ref 8.6–10.2)
CHLORIDE SERPL-SCNC: 107 MMOL/L (ref 98–107)
CO2 SERPL-SCNC: 26 MMOL/L (ref 22–29)
CREAT SERPL-MCNC: 0.5 MG/DL (ref 0.5–1)
EOSINOPHIL # BLD: 0.08 K/UL (ref 0.05–0.5)
EOSINOPHILS RELATIVE PERCENT: 0 % (ref 0–6)
ERYTHROCYTE [DISTWIDTH] IN BLOOD BY AUTOMATED COUNT: 14.5 % (ref 11.5–15)
GFR, ESTIMATED: >90 ML/MIN/1.73M2
GLUCOSE SERPL-MCNC: 152 MG/DL (ref 74–99)
HCT VFR BLD AUTO: 39.8 % (ref 34–48)
HGB BLD-MCNC: 12.6 G/DL (ref 11.5–15.5)
IMM GRANULOCYTES # BLD AUTO: 0.13 K/UL (ref 0–0.58)
IMM GRANULOCYTES NFR BLD: 1 % (ref 0–5)
LYMPHOCYTES NFR BLD: 3.13 K/UL (ref 1.5–4)
LYMPHOCYTES RELATIVE PERCENT: 17 % (ref 20–42)
MCH RBC QN AUTO: 29.4 PG (ref 26–35)
MCHC RBC AUTO-ENTMCNC: 31.7 G/DL (ref 32–34.5)
MCV RBC AUTO: 92.8 FL (ref 80–99.9)
MONOCYTES NFR BLD: 1.02 K/UL (ref 0.1–0.95)
MONOCYTES NFR BLD: 6 % (ref 2–12)
NEUTROPHILS NFR BLD: 75 % (ref 43–80)
NEUTS SEG NFR BLD: 13.52 K/UL (ref 1.8–7.3)
PLATELET # BLD AUTO: 359 K/UL (ref 130–450)
PMV BLD AUTO: 10.4 FL (ref 7–12)
POTASSIUM SERPL-SCNC: 3.8 MMOL/L (ref 3.5–5)
PROT SERPL-MCNC: 6.4 G/DL (ref 6.4–8.3)
RBC # BLD AUTO: 4.29 M/UL (ref 3.5–5.5)
SODIUM SERPL-SCNC: 144 MMOL/L (ref 132–146)
WBC OTHER # BLD: 18 K/UL (ref 4.5–11.5)

## 2025-02-06 PROCEDURE — 97530 THERAPEUTIC ACTIVITIES: CPT

## 2025-02-06 PROCEDURE — 6360000002 HC RX W HCPCS: Performed by: ORTHOPAEDIC SURGERY

## 2025-02-06 PROCEDURE — 2500000003 HC RX 250 WO HCPCS: Performed by: ORTHOPAEDIC SURGERY

## 2025-02-06 PROCEDURE — 6370000000 HC RX 637 (ALT 250 FOR IP): Performed by: ORTHOPAEDIC SURGERY

## 2025-02-06 PROCEDURE — 36415 COLL VENOUS BLD VENIPUNCTURE: CPT

## 2025-02-06 PROCEDURE — G0378 HOSPITAL OBSERVATION PER HR: HCPCS

## 2025-02-06 PROCEDURE — 85025 COMPLETE CBC W/AUTO DIFF WBC: CPT

## 2025-02-06 PROCEDURE — 1200000000 HC SEMI PRIVATE

## 2025-02-06 PROCEDURE — 94640 AIRWAY INHALATION TREATMENT: CPT

## 2025-02-06 PROCEDURE — 80053 COMPREHEN METABOLIC PANEL: CPT

## 2025-02-06 RX ORDER — OXYCODONE AND ACETAMINOPHEN 5; 325 MG/1; MG/1
1 TABLET ORAL EVERY 6 HOURS PRN
Qty: 28 TABLET | Refills: 0 | Status: SHIPPED | OUTPATIENT
Start: 2025-02-06 | End: 2025-02-13

## 2025-02-06 RX ADMIN — GABAPENTIN 300 MG: 300 CAPSULE ORAL at 10:06

## 2025-02-06 RX ADMIN — OXYCODONE HYDROCHLORIDE AND ACETAMINOPHEN 1 TABLET: 5; 325 TABLET ORAL at 12:16

## 2025-02-06 RX ADMIN — FAMOTIDINE 20 MG: 20 TABLET, FILM COATED ORAL at 10:06

## 2025-02-06 RX ADMIN — ARFORMOTEROL TARTRATE 15 MCG: 15 SOLUTION RESPIRATORY (INHALATION) at 08:05

## 2025-02-06 RX ADMIN — IPRATROPIUM BROMIDE 0.5 MG: 0.5 SOLUTION RESPIRATORY (INHALATION) at 08:05

## 2025-02-06 RX ADMIN — ACETAMINOPHEN 650 MG: 325 TABLET ORAL at 10:06

## 2025-02-06 RX ADMIN — IPRATROPIUM BROMIDE 0.5 MG: 0.5 SOLUTION RESPIRATORY (INHALATION) at 12:36

## 2025-02-06 RX ADMIN — ASPIRIN 81 MG: 81 TABLET, COATED ORAL at 10:06

## 2025-02-06 RX ADMIN — OXYCODONE HYDROCHLORIDE AND ACETAMINOPHEN 1 TABLET: 5; 325 TABLET ORAL at 07:32

## 2025-02-06 RX ADMIN — OXYCODONE HYDROCHLORIDE AND ACETAMINOPHEN 1 TABLET: 5; 325 TABLET ORAL at 01:25

## 2025-02-06 RX ADMIN — SODIUM CHLORIDE, PRESERVATIVE FREE 10 ML: 5 INJECTION INTRAVENOUS at 10:08

## 2025-02-06 RX ADMIN — DOCUSATE SODIUM 50 MG AND SENNOSIDES 8.6 MG 1 TABLET: 8.6; 5 TABLET, FILM COATED ORAL at 10:06

## 2025-02-06 RX ADMIN — IPRATROPIUM BROMIDE 0.5 MG: 0.5 SOLUTION RESPIRATORY (INHALATION) at 02:26

## 2025-02-06 ASSESSMENT — PAIN DESCRIPTION - LOCATION
LOCATION: HIP

## 2025-02-06 ASSESSMENT — PAIN DESCRIPTION - ORIENTATION
ORIENTATION: RIGHT

## 2025-02-06 ASSESSMENT — PAIN DESCRIPTION - DESCRIPTORS
DESCRIPTORS: ACHING;SORE;THROBBING
DESCRIPTORS: ACHING;SORE;THROBBING
DESCRIPTORS: ACHING;SORE
DESCRIPTORS: ACHING;SORE

## 2025-02-06 ASSESSMENT — PAIN SCALES - GENERAL
PAINLEVEL_OUTOF10: 5
PAINLEVEL_OUTOF10: 8
PAINLEVEL_OUTOF10: 10
PAINLEVEL_OUTOF10: 9
PAINLEVEL_OUTOF10: 6

## 2025-02-06 NOTE — PROGRESS NOTES
Internal Medicine Progress Note    Patient's name: Krista Johnson  : 1955  Chief complaints (on day of admission): Hip Pain (Right hip pain, had hip surgery two weeks ago and not reports unable to put any weight on that leg.)  Admission date: 2/3/2025  Date of service: 2025   Room: [address]  Primary care physician: Bon Morris DO  Reason for visit: Follow-up for right hip dislocation    Subjective  Krista is seen lying in bed asleep, in no distress.  She does easily awaken to voice.  She reports being tired but otherwise feels well.  Denies any shortness of breath, cough or chest congestion.  Denies any fever or chills.  Denies any nausea or vomiting.  She is passing gas however has not had a bowel movement.  Continues to urinate without any difficulty.  She is hoping to go home today.  No other issues or concerns from nursing.    Review of Systems  Full 10 point review of systems negative unless mentioned above.    Hospital Medications  Current Facility-Administered Medications   Medication Dose Route Frequency Provider Last Rate Last Admin    acetaminophen (TYLENOL) tablet 1,000 mg  1,000 mg Oral Once Leanna Sawant PA-C        dexAMETHasone (PF) (DECADRON) injection 8 mg  8 mg IntraVENous Once Leanna Sawant PA-C        lactated ringers infusion   IntraVENous Continuous Leanna Sawant PA-C        sodium chloride flush 0.9 % injection 5-40 mL  5-40 mL IntraVENous 2 times per day Leanna Sawant PA-C        sodium chloride flush 0.9 % injection 5-40 mL  5-40 mL IntraVENous PRN Leanna Sawant PA-C        0.9 % sodium chloride infusion   IntraVENous PRN Leanna Sawant, PA-C        sodium chloride flush 0.9 % injection 5-40 mL  5-40 mL IntraVENous 2 times per day Chepe Rothman MD        sodium chloride flush 0.9 % injection 5-40 mL  5-40 mL IntraVENous PRN Chepe Rothman MD        0.9 % sodium chloride infusion   IntraVENous PRN Chepe Rothman MD        acetaminophen (TYLENOL)
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
## 2025-02-06 NOTE — PROGRESS NOTES
Occupational Therapy  OT BEDSIDE TREATMENT NOTE      Date:2025  Patient Name: Krista Johnson  MRN: [id number]  : 1955  Room: [address] [place], [place] [address]     Evaluating OT: Marisa Jewell OTR/L   [id number]       Referring Provider:Chepe Rothman MD     Specific Provider Orders/Date:OT eval and treat 2025       Diagnosis:  Hip dislocation, right, initial encounter ([facility]) [S73.004A]  Closed posterior dislocation of right hip, initial encounter ([facility]) [S73.014A]    Procedure: OPEN REDUCTION RIGHT HIP DISLOCATION      ++MIRELA++ (Right: Hip)      Pertinent Medical History: R BASHIR 2025, chronic pain, asthma, 1st  and 2nd digit toe amputations L foot,      Precautions:  Fall Risk, FWBAT R LE, posterolateral hip precautions      Assessment of current deficits    [x] Functional mobility            [x]ADLs           [x] Strength                  [x]Cognition    [x] Functional transfers          [x] IADLs         [x] Safety Awareness   [x]Endurance    [] Fine Coordination                         [x] Balance      [] Vision/perception   []Sensation      []Gross Motor Coordination             [] ROM           [] Delirium                   [] Motor Control      OT PLAN OF CARE   OT POC based on physician orders, patient diagnosis and results of clinical assessment     Frequency/Duration  1-3 days/wk for  PRN   Specific OT Treatment Interventions to include:   ADL retraining/adapted techniques and AE recommendations to increase functional independence within precautions                    Energy conservation techniques to improve tolerance for selfcare routine   Functional transfer/mobility training/DME recommendations for increased independence, safety and fall prevention         Patient/family education to increase safety and functional independence             Environmental modifications for safe mobility and completion of ADLs                             Therapeutic activity to improve functional performance during ADLs.

## 2025-02-06 NOTE — CARE COORDINATION
Addended by: NESS LINARES on: 7/26/2023 03:08 PM     Modules accepted: Orders     Updated plan of care. POD#2. Plan for discharge today. Notified expand [facility] of discharge. Orders completed. Niece to  patient-mjo

## 2025-02-06 NOTE — PROGRESS NOTES
Open reduction right hip with acetabular revision    Post op Day 2      S: Pain controlled.  Tolerating diet.  Positive BM.  Mobilizing safely with walker    O:     Vitals:    02/06/25 0732   BP:    Pulse:    Resp: 18   Temp:    SpO2:         Dressing c/d/i   NV exam -intact right lower extremity   Calf - soft/nontender     H/H:   Recent Labs     02/06/25  0430   HGB 12.6   HCT 39.8        PT/INR:   Recent Labs     02/03/25  1843   INR 1.0       A/P:   Stable   Con't PT/OT   Dressing change postoperative day #7             Aspirin for DVT prophylaxis   Patient on Norco long-term   D/C [id number] office 2 weeks.  See discharge instructions

## 2025-02-06 NOTE — PLAN OF CARE
Problem: Chronic Conditions and Co-morbidities  Goal: Patient's chronic conditions and co-morbidity symptoms are monitored and maintained or improved  2/5/2025 2144 by Marisabel Yeung RN  Outcome: Progressing  2/5/2025 1109 by Carmencita Feliciano RN  Outcome: Progressing     Problem: Discharge Planning  Goal: Discharge to home or other facility with appropriate resources  2/5/2025 2144 by Marisabel Yeung RN  Outcome: Progressing  2/5/2025 1109 by Carmencita Feliciano RN  Outcome: Progressing     Problem: Pain  Goal: Verbalizes/displays adequate comfort level or baseline comfort level  2/5/2025 2144 by Marisabel Yeung RN  Outcome: Progressing  2/5/2025 1109 by Carmencita Feliciano RN  Outcome: Progressing     Problem: Skin/Tissue Integrity  Goal: Skin integrity remains intact  Description: 1.  Monitor for areas of redness and/or skin breakdown  2.  Assess vascular access sites hourly  3.  Every 4-6 hours minimum:  Change oxygen saturation probe site  4.  Every 4-6 hours:  If on nasal continuous positive airway pressure, respiratory therapy assess nares and determine need for appliance change or resting period  2/5/2025 2144 by Marisabel Yeung RN  Outcome: Progressing  2/5/2025 1109 by Carmencita Feliciano RN  Outcome: Progressing     Problem: Safety - Adult  Goal: Free from fall injury  2/5/2025 2144 by Marisabel Yeung RN  Outcome: Progressing  2/5/2025 1109 by Carmencita Feliciano RN  Outcome: Progressing     Problem: ABCDS Injury Assessment  Goal: Absence of physical injury  2/5/2025 2144 by Marisabel Yeung RN  Outcome: Progressing  2/5/2025 1109 by Carmencita Feliciano RN  Outcome: Progressing

## 2025-02-06 NOTE — DISCHARGE INSTRUCTIONS
Discharge Instructions for Hip Revision- Dr. Rothman    WBAT R LE with walker,.  Posterior Hip Precautions sit in straight-backed chair,-okay to lay on nonoperative side with pillow between knees.  Do not lay on a low couch or low chair.  Aspirin for postoperative DVT prophylaxis x 30 days  May shower daily  Remove hip bandage on postoperative day #7-next Tuesday  HARSH hose x 2 weeks, off at night  Ice packs to hip for 30 minutes every 3-4 hours  Pillows between knees while in bed and for sleeping  Home health care, PT and nursing  Follow up office visit in 2 wks - [phone], Ext [phone]

## 2025-02-06 NOTE — PLAN OF CARE
Problem: Chronic Conditions and Co-morbidities  Goal: Patient's chronic conditions and co-morbidity symptoms are monitored and maintained or improved  Outcome: Adequate for Discharge     Problem: Discharge Planning  Goal: Discharge to home or other facility with appropriate resources  Outcome: Adequate for Discharge     Problem: Pain  Goal: Verbalizes/displays adequate comfort level or baseline comfort level  Outcome: Adequate for Discharge     Problem: Skin/Tissue Integrity  Goal: Skin integrity remains intact  Description: 1.  Monitor for areas of redness and/or skin breakdown  2.  Assess vascular access sites hourly  3.  Every 4-6 hours minimum:  Change oxygen saturation probe site  4.  Every 4-6 hours:  If on nasal continuous positive airway pressure, respiratory therapy assess nares and determine need for appliance change or resting period  Outcome: Adequate for Discharge     Problem: Safety - Adult  Goal: Free from fall injury  Outcome: Adequate for Discharge     Problem: ABCDS Injury Assessment  Goal: Absence of physical injury  Outcome: Adequate for Discharge

## 2025-02-06 NOTE — PROGRESS NOTES
[facility] Quality Flow/Interdisciplinary Rounds Progress Note        Quality Flow Rounds held on February 6, 2025    Disciplines Attending:  Bedside Nurse, , , and Nursing Unit Leadership    Krista Johnson was admitted on 2/3/2025  2:58 PM    Anticipated Discharge Date:   2/6/2025    Disposition: Home with home health     Andrea Score:  Andrea Scale Score: 19    [facility] RISK OF UNPLANNED READMISSION 2.0             15 Total Score        Discussed patient goal for the day, patient clinical progression, and barriers to discharge.  The following Goal(s) of the Day/Commitment(s) have been identified:  Discharge - Obtain Order. RN call out Leanna GOEMZ re: clarify if patient can leave without being rounded on.       Hanna Gray RN  February 6, 2025

## 2025-02-07 LAB — SURGICAL PATHOLOGY REPORT: NORMAL

## 2025-02-07 NOTE — PROGRESS NOTES
CLINICAL PHARMACY NOTE: MEDS TO BEDS    Total # of Prescriptions Filled: 1   The following medications were delivered to the patient:  Percocet 5/325    Additional Documentation:  To pt

## 2025-03-05 ENCOUNTER — OFFICE VISIT (OUTPATIENT)
Dept: PRIMARY CARE CLINIC | Age: 70
End: 2025-03-05
Payer: COMMERCIAL

## 2025-03-05 VITALS
DIASTOLIC BLOOD PRESSURE: 52 MMHG | HEART RATE: 102 BPM | WEIGHT: 127 LBS | TEMPERATURE: 97.4 F | OXYGEN SATURATION: 97 % | HEIGHT: 72 IN | BODY MASS INDEX: 17.2 KG/M2 | SYSTOLIC BLOOD PRESSURE: 112 MMHG

## 2025-03-05 DIAGNOSIS — Z72.0 TOBACCO USE: ICD-10-CM

## 2025-03-05 DIAGNOSIS — Z87.828 HX OF CLOSED DISLOCATION OF HIP: ICD-10-CM

## 2025-03-05 DIAGNOSIS — Z87.81 HISTORY OF FRACTURE OF RIGHT HIP: Primary | ICD-10-CM

## 2025-03-05 DIAGNOSIS — M54.50 LUMBAR PAIN: ICD-10-CM

## 2025-03-05 DIAGNOSIS — M43.06 LUMBAR SPONDYLOLYSIS: ICD-10-CM

## 2025-03-05 DIAGNOSIS — E78.5 HYPERLIPIDEMIA, UNSPECIFIED HYPERLIPIDEMIA TYPE: ICD-10-CM

## 2025-03-05 DIAGNOSIS — J43.8 OTHER EMPHYSEMA (HCC): ICD-10-CM

## 2025-03-05 PROCEDURE — 1159F MED LIST DOCD IN RCRD: CPT | Performed by: FAMILY MEDICINE

## 2025-03-05 PROCEDURE — 99214 OFFICE O/P EST MOD 30 MIN: CPT | Performed by: FAMILY MEDICINE

## 2025-03-05 PROCEDURE — 1123F ACP DISCUSS/DSCN MKR DOCD: CPT | Performed by: FAMILY MEDICINE

## 2025-03-05 RX ORDER — OXYCODONE AND ACETAMINOPHEN 10; 325 MG/1; MG/1
1 TABLET ORAL EVERY 6 HOURS PRN
COMMUNITY
Start: 2025-02-13

## 2025-03-05 RX ORDER — UMECLIDINIUM BROMIDE AND VILANTEROL TRIFENATATE 62.5; 25 UG/1; UG/1
1 POWDER RESPIRATORY (INHALATION) DAILY
Qty: 1 EACH | Refills: 1 | Status: SHIPPED | OUTPATIENT
Start: 2025-03-05

## 2025-03-05 RX ORDER — GABAPENTIN 300 MG/1
300 CAPSULE ORAL 2 TIMES DAILY
Qty: 180 CAPSULE | Refills: 0 | Status: SHIPPED | OUTPATIENT
Start: 2025-03-05 | End: 2025-06-03

## 2025-03-05 RX ORDER — ATORVASTATIN CALCIUM 40 MG/1
40 TABLET, FILM COATED ORAL NIGHTLY
Qty: 90 TABLET | Refills: 3 | Status: SHIPPED | OUTPATIENT
Start: 2025-03-05

## 2025-03-05 ASSESSMENT — PATIENT HEALTH QUESTIONNAIRE - PHQ9
7. TROUBLE CONCENTRATING ON THINGS, SUCH AS READING THE NEWSPAPER OR WATCHING TELEVISION: NOT AT ALL
3. TROUBLE FALLING OR STAYING ASLEEP: NOT AT ALL
SUM OF ALL RESPONSES TO PHQ QUESTIONS 1-9: 2
10. IF YOU CHECKED OFF ANY PROBLEMS, HOW DIFFICULT HAVE THESE PROBLEMS MADE IT FOR YOU TO DO YOUR WORK, TAKE CARE OF THINGS AT HOME, OR GET ALONG WITH OTHER PEOPLE: SOMEWHAT DIFFICULT
6. FEELING BAD ABOUT YOURSELF - OR THAT YOU ARE A FAILURE OR HAVE LET YOURSELF OR YOUR FAMILY DOWN: NOT AT ALL
4. FEELING TIRED OR HAVING LITTLE ENERGY: NOT AT ALL
SUM OF ALL RESPONSES TO PHQ QUESTIONS 1-9: 2
8. MOVING OR SPEAKING SO SLOWLY THAT OTHER PEOPLE COULD HAVE NOTICED. OR THE OPPOSITE, BEING SO FIGETY OR RESTLESS THAT YOU HAVE BEEN MOVING AROUND A LOT MORE THAN USUAL: NOT AT ALL
SUM OF ALL RESPONSES TO PHQ QUESTIONS 1-9: 2
5. POOR APPETITE OR OVEREATING: NOT AT ALL
2. FEELING DOWN, DEPRESSED OR HOPELESS: SEVERAL DAYS
SUM OF ALL RESPONSES TO PHQ QUESTIONS 1-9: 2
9. THOUGHTS THAT YOU WOULD BE BETTER OFF DEAD, OR OF HURTING YOURSELF: NOT AT ALL
1. LITTLE INTEREST OR PLEASURE IN DOING THINGS: SEVERAL DAYS

## 2025-03-05 ASSESSMENT — ENCOUNTER SYMPTOMS
EYES NEGATIVE: 1
ALLERGIC/IMMUNOLOGIC NEGATIVE: 1
GASTROINTESTINAL NEGATIVE: 1
RESPIRATORY NEGATIVE: 1

## 2025-03-05 NOTE — PROGRESS NOTES
Assessment:  Krista was seen today for medication refill, anxiety, depression and annual exam.    Diagnoses and all orders for this visit:    History of fracture of right hip  -     CBC with Auto Differential; Future  -     Comprehensive Metabolic Panel; Future  -     T4; Future  -     Lipid Panel; Future  -     Hemoglobin A1C; Future  -     TSH; Future    Lumbar pain    Lumbar spondylolysis  -     CBC with Auto Differential; Future  -     Comprehensive Metabolic Panel; Future  -     T4; Future  -     Lipid Panel; Future  -     Hemoglobin A1C; Future  -     TSH; Future    Hx of closed dislocation of hip  -     CBC with Auto Differential; Future  -     Comprehensive Metabolic Panel; Future  -     T4; Future  -     Lipid Panel; Future  -     Hemoglobin A1C; Future  -     TSH; Future    Tobacco use    Hyperlipidemia, unspecified hyperlipidemia type  -     CBC with Auto Differential; Future  -     Comprehensive Metabolic Panel; Future  -     T4; Future  -     Lipid Panel; Future  -     Hemoglobin A1C; Future  -     TSH; Future    Other emphysema (HCC)  -     CBC with Auto Differential; Future  -     Comprehensive Metabolic Panel; Future  -     T4; Future  -     Lipid Panel; Future  -     Hemoglobin A1C; Future  -     TSH; Future    Other orders  -     umeclidinium-vilanterol (ANORO ELLIPTA) 62.5-25 MCG/ACT inhaler; Inhale 1 puff into the lungs daily  -     gabapentin (NEURONTIN) 300 MG capsule; Take 1 capsule by mouth 2 times daily for 90 days. Intended supply: 90 days  -     atorvastatin (LIPITOR) 40 MG tablet; Take 1 tablet by mouth nightly            Return in about 6 weeks (around 4/16/2025).      Bon Morris,     Note was generated with the assistance of voice recognition software.  Document was reviewed however may contain grammatical errors.

## 2025-04-17 ENCOUNTER — OFFICE VISIT (OUTPATIENT)
Dept: PRIMARY CARE CLINIC | Age: 70
End: 2025-04-17
Payer: COMMERCIAL

## 2025-04-17 VITALS
WEIGHT: 130 LBS | TEMPERATURE: 97.7 F | OXYGEN SATURATION: 93 % | SYSTOLIC BLOOD PRESSURE: 130 MMHG | HEART RATE: 79 BPM | DIASTOLIC BLOOD PRESSURE: 70 MMHG | BODY MASS INDEX: 17.61 KG/M2 | HEIGHT: 72 IN

## 2025-04-17 DIAGNOSIS — J43.8 OTHER EMPHYSEMA (HCC): Primary | ICD-10-CM

## 2025-04-17 DIAGNOSIS — J43.2 CENTRILOBULAR EMPHYSEMA (HCC): ICD-10-CM

## 2025-04-17 DIAGNOSIS — Z87.828 HX OF CLOSED DISLOCATION OF HIP: ICD-10-CM

## 2025-04-17 DIAGNOSIS — F33.0 MAJOR DEPRESSIVE DISORDER, RECURRENT, MILD: ICD-10-CM

## 2025-04-17 DIAGNOSIS — M43.06 LUMBAR SPONDYLOLYSIS: ICD-10-CM

## 2025-04-17 DIAGNOSIS — Z87.81 HISTORY OF FRACTURE OF RIGHT HIP: ICD-10-CM

## 2025-04-17 DIAGNOSIS — I73.9 PAD (PERIPHERAL ARTERY DISEASE): ICD-10-CM

## 2025-04-17 DIAGNOSIS — Z72.0 TOBACCO USE: ICD-10-CM

## 2025-04-17 PROBLEM — F33.9 MAJOR DEPRESSIVE DISORDER, RECURRENT, UNSPECIFIED: Status: RESOLVED | Noted: 2023-07-27 | Resolved: 2025-04-17

## 2025-04-17 PROBLEM — F33.1 MAJOR DEPRESSIVE DISORDER, RECURRENT, MODERATE (HCC): Status: RESOLVED | Noted: 2023-07-27 | Resolved: 2025-04-17

## 2025-04-17 PROCEDURE — 1123F ACP DISCUSS/DSCN MKR DOCD: CPT | Performed by: FAMILY MEDICINE

## 2025-04-17 PROCEDURE — 1159F MED LIST DOCD IN RCRD: CPT | Performed by: FAMILY MEDICINE

## 2025-04-17 PROCEDURE — 99214 OFFICE O/P EST MOD 30 MIN: CPT | Performed by: FAMILY MEDICINE

## 2025-04-17 RX ORDER — BUPROPION HYDROCHLORIDE 150 MG/1
150 TABLET ORAL EVERY MORNING
Qty: 30 TABLET | Refills: 3 | Status: SHIPPED | OUTPATIENT
Start: 2025-04-17

## 2025-04-17 RX ORDER — ALBUTEROL SULFATE 90 UG/1
2 INHALANT RESPIRATORY (INHALATION) 4 TIMES DAILY PRN
Qty: 18 G | Refills: 5 | Status: SHIPPED | OUTPATIENT
Start: 2025-04-17

## 2025-04-17 ASSESSMENT — ENCOUNTER SYMPTOMS
GASTROINTESTINAL NEGATIVE: 1
EYES NEGATIVE: 1
SHORTNESS OF BREATH: 1
ALLERGIC/IMMUNOLOGIC NEGATIVE: 1
COUGH: 1

## 2025-04-17 NOTE — PROGRESS NOTES
25     Krista Johnson    : 1955 Sex: female   Age: 69 y.o.      Chief Complaint   Patient presents with    Follow-up       Prior to Admission medications    Medication Sig Start Date End Date Taking? Authorizing Provider   buPROPion (WELLBUTRIN XL) 150 MG extended release tablet Take 1 tablet by mouth every morning 25  Yes Bon Morris DO   albuterol sulfate HFA (VENTOLIN HFA) 108 (90 Base) MCG/ACT inhaler Inhale 2 puffs into the lungs 4 times daily as needed for Wheezing 25  Yes Bon Morris DO   oxyCODONE-acetaminophen (PERCOCET)  MG per tablet Take 1 tablet by mouth every 6 hours as needed for Pain. 25   Provider, MD Aylin   umeclidinium-vilanterol (ANORO ELLIPTA) 62.5-25 MCG/ACT inhaler Inhale 1 puff into the lungs daily 3/5/25   Bon Morris DO   gabapentin (NEURONTIN) 300 MG capsule Take 1 capsule by mouth 2 times daily for 90 days. Intended supply: 90 days 3/5/25 6/3/25  Bon Morris DO   atorvastatin (LIPITOR) 40 MG tablet Take 1 tablet by mouth nightly 3/5/25   Bon Morris DO   aspirin 81 MG EC tablet Take 1 tablet by mouth in the morning and at bedtime for 56 doses 1/6/25 3/5/25  Bryan Salter DO          HPI: Patient is seen today issues of emphysema chronic lung disease chronic smoker.  Recent history of right hip repair and seems to be doing fairly well.  Some mild anxiety depression that has recurred and she is agreeable to trying Wellbutrin which we initiated today.  Hopefully this will be helpful with her cigarette use and reduce the desire.  Currently at a pack a day.  Will plan to follow-up in about 6 weeks.  I did prescribe albuterol in addition to her current use of an oral Ellipta and pulmonary referral today.          Review of Systems   Constitutional:  Positive for fatigue.   HENT: Negative.     Eyes: Negative.    Respiratory:  Positive for cough and shortness of breath.    Gastrointestinal: Negative.

## 2025-04-28 ENCOUNTER — TELEPHONE (OUTPATIENT)
Dept: PRIMARY CARE CLINIC | Age: 70
End: 2025-04-28

## 2025-04-28 NOTE — TELEPHONE ENCOUNTER
Prefer to wait till follow-up and will prescribe at that time if needed    may need additional testing as well.

## 2025-04-28 NOTE — TELEPHONE ENCOUNTER
Patient calling states you discussed placing referral for neuropathy pain in her feet. No referral in chart

## 2025-06-02 ENCOUNTER — OFFICE VISIT (OUTPATIENT)
Dept: PRIMARY CARE CLINIC | Age: 70
End: 2025-06-02
Payer: COMMERCIAL

## 2025-06-02 VITALS
OXYGEN SATURATION: 91 % | TEMPERATURE: 97.5 F | HEIGHT: 72 IN | RESPIRATION RATE: 18 BRPM | SYSTOLIC BLOOD PRESSURE: 120 MMHG | WEIGHT: 140 LBS | BODY MASS INDEX: 18.96 KG/M2 | HEART RATE: 71 BPM | DIASTOLIC BLOOD PRESSURE: 60 MMHG

## 2025-06-02 DIAGNOSIS — I73.9 PAD (PERIPHERAL ARTERY DISEASE): ICD-10-CM

## 2025-06-02 DIAGNOSIS — M79.671 FOOT PAIN, BILATERAL: Primary | ICD-10-CM

## 2025-06-02 DIAGNOSIS — R73.01 IMPAIRED FASTING GLUCOSE: ICD-10-CM

## 2025-06-02 DIAGNOSIS — E78.5 HYPERLIPIDEMIA, UNSPECIFIED HYPERLIPIDEMIA TYPE: ICD-10-CM

## 2025-06-02 DIAGNOSIS — M79.672 FOOT PAIN, BILATERAL: Primary | ICD-10-CM

## 2025-06-02 DIAGNOSIS — J43.2 CENTRILOBULAR EMPHYSEMA (HCC): ICD-10-CM

## 2025-06-02 PROCEDURE — 4004F PT TOBACCO SCREEN RCVD TLK: CPT | Performed by: FAMILY MEDICINE

## 2025-06-02 PROCEDURE — 99214 OFFICE O/P EST MOD 30 MIN: CPT | Performed by: FAMILY MEDICINE

## 2025-06-02 PROCEDURE — 1090F PRES/ABSN URINE INCON ASSESS: CPT | Performed by: FAMILY MEDICINE

## 2025-06-02 PROCEDURE — 3023F SPIROM DOC REV: CPT | Performed by: FAMILY MEDICINE

## 2025-06-02 PROCEDURE — G8427 DOCREV CUR MEDS BY ELIG CLIN: HCPCS | Performed by: FAMILY MEDICINE

## 2025-06-02 PROCEDURE — G8400 PT W/DXA NO RESULTS DOC: HCPCS | Performed by: FAMILY MEDICINE

## 2025-06-02 PROCEDURE — G8420 CALC BMI NORM PARAMETERS: HCPCS | Performed by: FAMILY MEDICINE

## 2025-06-02 PROCEDURE — 1123F ACP DISCUSS/DSCN MKR DOCD: CPT | Performed by: FAMILY MEDICINE

## 2025-06-02 PROCEDURE — 3017F COLORECTAL CA SCREEN DOC REV: CPT | Performed by: FAMILY MEDICINE

## 2025-06-02 RX ORDER — ATORVASTATIN CALCIUM 40 MG/1
40 TABLET, FILM COATED ORAL NIGHTLY
Qty: 90 TABLET | Refills: 3 | Status: SHIPPED | OUTPATIENT
Start: 2025-06-02

## 2025-06-02 RX ORDER — DULOXETIN HYDROCHLORIDE 30 MG/1
30 CAPSULE, DELAYED RELEASE ORAL DAILY
Qty: 30 CAPSULE | Refills: 5 | Status: SHIPPED | OUTPATIENT
Start: 2025-06-02

## 2025-06-02 ASSESSMENT — ENCOUNTER SYMPTOMS
EYES NEGATIVE: 1
RESPIRATORY NEGATIVE: 1
GASTROINTESTINAL NEGATIVE: 1
ALLERGIC/IMMUNOLOGIC NEGATIVE: 1
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
## 2025-06-30 ENCOUNTER — OFFICE VISIT (OUTPATIENT)
Dept: PRIMARY CARE CLINIC | Age: 70
End: 2025-06-30
Payer: COMMERCIAL

## 2025-06-30 VITALS
OXYGEN SATURATION: 90 % | SYSTOLIC BLOOD PRESSURE: 100 MMHG | DIASTOLIC BLOOD PRESSURE: 52 MMHG | RESPIRATION RATE: 18 BRPM | BODY MASS INDEX: 19.53 KG/M2 | WEIGHT: 144 LBS | HEART RATE: 82 BPM | TEMPERATURE: 97.8 F

## 2025-06-30 DIAGNOSIS — R73.02 IMPAIRED GLUCOSE TOLERANCE: ICD-10-CM

## 2025-06-30 DIAGNOSIS — R20.2 NUMBNESS AND TINGLING OF BOTH FEET: ICD-10-CM

## 2025-06-30 DIAGNOSIS — I73.9 PAD (PERIPHERAL ARTERY DISEASE): ICD-10-CM

## 2025-06-30 DIAGNOSIS — R20.0 NUMBNESS AND TINGLING OF BOTH FEET: ICD-10-CM

## 2025-06-30 DIAGNOSIS — J43.2 CENTRILOBULAR EMPHYSEMA (HCC): Primary | ICD-10-CM

## 2025-06-30 DIAGNOSIS — M79.671 FOOT PAIN, BILATERAL: ICD-10-CM

## 2025-06-30 DIAGNOSIS — M79.672 FOOT PAIN, BILATERAL: ICD-10-CM

## 2025-06-30 DIAGNOSIS — M79.2 FOOT NEURALGIA: ICD-10-CM

## 2025-06-30 PROCEDURE — G8420 CALC BMI NORM PARAMETERS: HCPCS | Performed by: FAMILY MEDICINE

## 2025-06-30 PROCEDURE — 3023F SPIROM DOC REV: CPT | Performed by: FAMILY MEDICINE

## 2025-06-30 PROCEDURE — 3017F COLORECTAL CA SCREEN DOC REV: CPT | Performed by: FAMILY MEDICINE

## 2025-06-30 PROCEDURE — G8400 PT W/DXA NO RESULTS DOC: HCPCS | Performed by: FAMILY MEDICINE

## 2025-06-30 PROCEDURE — 99214 OFFICE O/P EST MOD 30 MIN: CPT | Performed by: FAMILY MEDICINE

## 2025-06-30 PROCEDURE — 1090F PRES/ABSN URINE INCON ASSESS: CPT | Performed by: FAMILY MEDICINE

## 2025-06-30 PROCEDURE — 1123F ACP DISCUSS/DSCN MKR DOCD: CPT | Performed by: FAMILY MEDICINE

## 2025-06-30 PROCEDURE — 4004F PT TOBACCO SCREEN RCVD TLK: CPT | Performed by: FAMILY MEDICINE

## 2025-06-30 PROCEDURE — G8427 DOCREV CUR MEDS BY ELIG CLIN: HCPCS | Performed by: FAMILY MEDICINE

## 2025-06-30 RX ORDER — GABAPENTIN 300 MG/1
300 CAPSULE ORAL 3 TIMES DAILY
Qty: 90 CAPSULE | Refills: 5 | Status: SHIPPED | OUTPATIENT
Start: 2025-06-30 | End: 2025-12-27

## 2025-06-30 ASSESSMENT — ENCOUNTER SYMPTOMS
GASTROINTESTINAL NEGATIVE: 1
RESPIRATORY NEGATIVE: 1
ALLERGIC/IMMUNOLOGIC NEGATIVE: 1
EYES NEGATIVE: 1

## 2025-09-02 ENCOUNTER — APPOINTMENT (OUTPATIENT)
Dept: CT IMAGING | Age: 70
End: 2025-09-02
Attending: STUDENT IN AN ORGANIZED HEALTH CARE EDUCATION/TRAINING PROGRAM
Payer: COMMERCIAL

## 2025-09-02 ENCOUNTER — APPOINTMENT (OUTPATIENT)
Dept: GENERAL RADIOLOGY | Age: 70
End: 2025-09-02
Payer: COMMERCIAL

## 2025-09-02 ENCOUNTER — HOSPITAL ENCOUNTER (INPATIENT)
Age: 70
LOS: 4 days | Discharge: HOME | End: 2025-09-06
Admitting: INTERNAL MEDICINE
Payer: COMMERCIAL

## 2025-09-02 ENCOUNTER — APPOINTMENT (OUTPATIENT)
Dept: CT IMAGING | Age: 70
End: 2025-09-02
Payer: COMMERCIAL

## 2025-09-02 ENCOUNTER — APPOINTMENT (OUTPATIENT)
Dept: ULTRASOUND IMAGING | Age: 70
End: 2025-09-02
Payer: COMMERCIAL

## 2025-09-02 DIAGNOSIS — R41.82 ALTERED MENTAL STATUS, UNSPECIFIED ALTERED MENTAL STATUS TYPE: Primary | ICD-10-CM

## 2025-09-02 DIAGNOSIS — I63.9 ACUTE ISCHEMIC STROKE (HCC): ICD-10-CM

## 2025-09-02 DIAGNOSIS — I65.21 STENOSIS OF RIGHT CAROTID ARTERY: ICD-10-CM

## 2025-09-02 DIAGNOSIS — M79.2 FOOT NEURALGIA: ICD-10-CM

## 2025-09-02 DIAGNOSIS — E87.6 HYPOKALEMIA: ICD-10-CM

## 2025-09-02 DIAGNOSIS — I63.9 CEREBROVASCULAR ACCIDENT (CVA), UNSPECIFIED MECHANISM (HCC): ICD-10-CM

## 2025-09-02 PROBLEM — R09.02 HYPOXIA: Status: ACTIVE | Noted: 2025-09-02

## 2025-09-02 PROBLEM — G45.9 TIA (TRANSIENT ISCHEMIC ATTACK): Status: ACTIVE | Noted: 2025-09-02

## 2025-09-02 PROBLEM — R47.01 APHASIA: Status: ACTIVE | Noted: 2025-09-02

## 2025-09-02 LAB
ANION GAP SERPL CALCULATED.3IONS-SCNC: 15 MMOL/L (ref 7–16)
B.E.: 0.2 MMOL/L (ref -3–3)
B.E.: 1.4 MMOL/L (ref -3–3)
BACTERIA URNS QL MICRO: ABNORMAL
BASOPHILS # BLD: 0 K/UL (ref 0–0.2)
BASOPHILS NFR BLD: 0 % (ref 0–2)
BILIRUB UR QL STRIP: NEGATIVE
BUN SERPL-MCNC: 30 MG/DL (ref 8–23)
CALCIUM SERPL-MCNC: 8.2 MG/DL (ref 8.8–10.2)
CHLORIDE SERPL-SCNC: 104 MMOL/L (ref 98–107)
CHP ED QC CHECK: NORMAL
CLARITY UR: ABNORMAL
CO2 SERPL-SCNC: 24 MMOL/L (ref 22–29)
COHB: 2.5 % (ref 0–1.5)
COHB: 5.4 % (ref 0–1.5)
COLOR UR: YELLOW
CREAT SERPL-MCNC: 1.6 MG/DL (ref 0.5–1)
CRITICAL: ABNORMAL
CRITICAL: ABNORMAL
DATE ANALYZED: ABNORMAL
DATE ANALYZED: ABNORMAL
DATE OF COLLECTION: ABNORMAL
DATE OF COLLECTION: ABNORMAL
EKG ATRIAL RATE: 89 BPM
EKG P AXIS: 73 DEGREES
EKG P-R INTERVAL: 144 MS
EKG Q-T INTERVAL: 474 MS
EKG QRS DURATION: 88 MS
EKG QTC CALCULATION (BAZETT): 576 MS
EKG R AXIS: 67 DEGREES
EKG T AXIS: 72 DEGREES
EKG VENTRICULAR RATE: 89 BPM
EOSINOPHIL # BLD: 0 K/UL (ref 0.05–0.5)
EOSINOPHILS RELATIVE PERCENT: 0 % (ref 0–6)
ERYTHROCYTE [DISTWIDTH] IN BLOOD BY AUTOMATED COUNT: 17.8 % (ref 11.5–15)
GFR, ESTIMATED: 36 ML/MIN/1.73M2
GLUCOSE BLD-MCNC: 154 MG/DL
GLUCOSE BLD-MCNC: 154 MG/DL (ref 74–99)
GLUCOSE SERPL-MCNC: 179 MG/DL (ref 74–99)
GLUCOSE UR STRIP-MCNC: NEGATIVE MG/DL
HCO3: 25.2 MMOL/L (ref 22–26)
HCO3: 27.6 MMOL/L (ref 22–26)
HCT VFR BLD AUTO: 47.3 % (ref 34–48)
HGB BLD-MCNC: 14.8 G/DL (ref 11.5–15.5)
HGB UR QL STRIP.AUTO: ABNORMAL
HHB: 3.9 % (ref 0–5)
HHB: 6.7 % (ref 0–5)
INR PPP: 1.4
KETONES UR STRIP-MCNC: NEGATIVE MG/DL
LAB: ABNORMAL
LAB: ABNORMAL
LEUKOCYTE ESTERASE UR QL STRIP: ABNORMAL
LYMPHOCYTES NFR BLD: 0.84 K/UL (ref 1.5–4)
LYMPHOCYTES RELATIVE PERCENT: 9 % (ref 20–42)
Lab: 1308
Lab: 2020
MCH RBC QN AUTO: 28.4 PG (ref 26–35)
MCHC RBC AUTO-ENTMCNC: 31.3 G/DL (ref 32–34.5)
MCV RBC AUTO: 90.6 FL (ref 80–99.9)
METHB: 0.2 % (ref 0–1.5)
METHB: 0.3 % (ref 0–1.5)
MODE: ABNORMAL
MODE: ABNORMAL
MONOCYTES NFR BLD: 0.51 K/UL (ref 0.1–0.95)
MONOCYTES NFR BLD: 5 % (ref 2–12)
NEUTROPHILS NFR BLD: 86 % (ref 43–80)
NEUTS SEG NFR BLD: 8.35 K/UL (ref 1.8–7.3)
NITRITE UR QL STRIP: NEGATIVE
O2 SATURATION: 93.1 % (ref 92–98.5)
O2 SATURATION: 95.9 % (ref 92–98.5)
O2HB: 90.4 % (ref 94–97)
O2HB: 90.6 % (ref 94–97)
OPERATOR ID: 1868
OPERATOR ID: ABNORMAL
PARTIAL THROMBOPLASTIN TIME: 33.5 SEC (ref 24.5–35.1)
PATIENT TEMP: 37 C
PATIENT TEMP: 37 C
PCO2: 42 MMHG (ref 35–45)
PCO2: 49.1 MMHG (ref 35–45)
PH BLOOD GAS: 7.37 (ref 7.35–7.45)
PH BLOOD GAS: 7.4 (ref 7.35–7.45)
PH UR STRIP: 6.5 [PH] (ref 5–8)
PLATELET # BLD AUTO: 186 K/UL (ref 130–450)
PMV BLD AUTO: 9.7 FL (ref 7–12)
PO2: 65.6 MMHG (ref 75–100)
PO2: 82.8 MMHG (ref 75–100)
POTASSIUM SERPL-SCNC: 2.8 MMOL/L (ref 3.5–5.1)
PROT UR STRIP-MCNC: 100 MG/DL
PROTHROMBIN TIME: 15.1 SEC (ref 9.3–12.4)
RBC # BLD AUTO: 5.22 M/UL (ref 3.5–5.5)
RBC # BLD: ABNORMAL 10*6/UL
RBC #/AREA URNS HPF: ABNORMAL /HPF
SODIUM SERPL-SCNC: 143 MMOL/L (ref 136–145)
SOURCE, BLOOD GAS: ABNORMAL
SOURCE, BLOOD GAS: ABNORMAL
SP GR UR STRIP: 1.01 (ref 1–1.03)
THB: 15.1 G/DL (ref 11.5–15.5)
THB: 16.2 G/DL (ref 11.5–15.5)
TIME ANALYZED: 1323
TIME ANALYZED: 2027
UROBILINOGEN UR STRIP-ACNC: 1 EU/DL (ref 0–1)
WBC #/AREA URNS HPF: ABNORMAL /HPF
WBC OTHER # BLD: 9.7 K/UL (ref 4.5–11.5)

## 2025-09-02 PROCEDURE — 70496 CT ANGIOGRAPHY HEAD: CPT

## 2025-09-02 PROCEDURE — 85025 COMPLETE CBC W/AUTO DIFF WBC: CPT

## 2025-09-02 PROCEDURE — 85610 PROTHROMBIN TIME: CPT

## 2025-09-02 PROCEDURE — 94640 AIRWAY INHALATION TREATMENT: CPT

## 2025-09-02 PROCEDURE — 71045 X-RAY EXAM CHEST 1 VIEW: CPT

## 2025-09-02 PROCEDURE — 94660 CPAP INITIATION&MGMT: CPT

## 2025-09-02 PROCEDURE — 80048 BASIC METABOLIC PNL TOTAL CA: CPT

## 2025-09-02 PROCEDURE — 2500000003 HC RX 250 WO HCPCS: Performed by: INTERNAL MEDICINE

## 2025-09-02 PROCEDURE — 70498 CT ANGIOGRAPHY NECK: CPT

## 2025-09-02 PROCEDURE — 99223 1ST HOSP IP/OBS HIGH 75: CPT | Performed by: INTERNAL MEDICINE

## 2025-09-02 PROCEDURE — 82805 BLOOD GASES W/O2 SATURATION: CPT

## 2025-09-02 PROCEDURE — 2140000000 HC CCU INTERMEDIATE R&B

## 2025-09-02 PROCEDURE — 99285 EMERGENCY DEPT VISIT HI MDM: CPT

## 2025-09-02 PROCEDURE — 0042T CT BRAIN PERFUSION: CPT

## 2025-09-02 PROCEDURE — 6370000000 HC RX 637 (ALT 250 FOR IP): Performed by: INTERNAL MEDICINE

## 2025-09-02 PROCEDURE — 93010 ELECTROCARDIOGRAM REPORT: CPT | Performed by: INTERNAL MEDICINE

## 2025-09-02 PROCEDURE — 99223 1ST HOSP IP/OBS HIGH 75: CPT | Performed by: PSYCHIATRY & NEUROLOGY

## 2025-09-02 PROCEDURE — 2580000003 HC RX 258

## 2025-09-02 PROCEDURE — 6360000002 HC RX W HCPCS: Performed by: INTERNAL MEDICINE

## 2025-09-02 PROCEDURE — 82962 GLUCOSE BLOOD TEST: CPT

## 2025-09-02 PROCEDURE — 70450 CT HEAD/BRAIN W/O DYE: CPT

## 2025-09-02 PROCEDURE — 6360000004 HC RX CONTRAST MEDICATION: Performed by: RADIOLOGY

## 2025-09-02 PROCEDURE — 87086 URINE CULTURE/COLONY COUNT: CPT

## 2025-09-02 PROCEDURE — 81001 URINALYSIS AUTO W/SCOPE: CPT

## 2025-09-02 PROCEDURE — 93880 EXTRACRANIAL BILAT STUDY: CPT

## 2025-09-02 PROCEDURE — 6370000000 HC RX 637 (ALT 250 FOR IP)

## 2025-09-02 PROCEDURE — 87077 CULTURE AEROBIC IDENTIFY: CPT

## 2025-09-02 PROCEDURE — 93005 ELECTROCARDIOGRAM TRACING: CPT

## 2025-09-02 PROCEDURE — 85730 THROMBOPLASTIN TIME PARTIAL: CPT

## 2025-09-02 RX ORDER — SODIUM CHLORIDE 0.9 % (FLUSH) 0.9 %
5-40 SYRINGE (ML) INJECTION EVERY 12 HOURS SCHEDULED
Status: DISCONTINUED | OUTPATIENT
Start: 2025-09-02 | End: 2025-09-06 | Stop reason: HOSPADM

## 2025-09-02 RX ORDER — CLOPIDOGREL 300 MG/1
300 TABLET, FILM COATED ORAL ONCE
Status: COMPLETED | OUTPATIENT
Start: 2025-09-02 | End: 2025-09-02

## 2025-09-02 RX ORDER — ENOXAPARIN SODIUM 100 MG/ML
40 INJECTION SUBCUTANEOUS DAILY
Status: DISCONTINUED | OUTPATIENT
Start: 2025-09-03 | End: 2025-09-06 | Stop reason: HOSPADM

## 2025-09-02 RX ORDER — ASPIRIN 81 MG/1
81 TABLET ORAL DAILY
Status: DISCONTINUED | OUTPATIENT
Start: 2025-09-03 | End: 2025-09-06 | Stop reason: HOSPADM

## 2025-09-02 RX ORDER — ACETAMINOPHEN 325 MG/1
650 TABLET ORAL EVERY 6 HOURS PRN
Status: DISCONTINUED | OUTPATIENT
Start: 2025-09-02 | End: 2025-09-06 | Stop reason: HOSPADM

## 2025-09-02 RX ORDER — MAGNESIUM SULFATE IN WATER 40 MG/ML
2000 INJECTION, SOLUTION INTRAVENOUS PRN
Status: DISCONTINUED | OUTPATIENT
Start: 2025-09-02 | End: 2025-09-06 | Stop reason: HOSPADM

## 2025-09-02 RX ORDER — CALCIUM CARBONATE 500 MG/1
500 TABLET, CHEWABLE ORAL 3 TIMES DAILY PRN
Status: DISCONTINUED | OUTPATIENT
Start: 2025-09-02 | End: 2025-09-06 | Stop reason: HOSPADM

## 2025-09-02 RX ORDER — POTASSIUM CHLORIDE 7.45 MG/ML
10 INJECTION INTRAVENOUS PRN
Status: DISCONTINUED | OUTPATIENT
Start: 2025-09-02 | End: 2025-09-06 | Stop reason: HOSPADM

## 2025-09-02 RX ORDER — HYDRALAZINE HYDROCHLORIDE 20 MG/ML
10 INJECTION INTRAMUSCULAR; INTRAVENOUS EVERY 6 HOURS PRN
Status: DISCONTINUED | OUTPATIENT
Start: 2025-09-02 | End: 2025-09-06 | Stop reason: HOSPADM

## 2025-09-02 RX ORDER — ONDANSETRON 2 MG/ML
4 INJECTION INTRAMUSCULAR; INTRAVENOUS EVERY 6 HOURS PRN
Status: DISCONTINUED | OUTPATIENT
Start: 2025-09-02 | End: 2025-09-06 | Stop reason: HOSPADM

## 2025-09-02 RX ORDER — SODIUM CHLORIDE 0.9 % (FLUSH) 0.9 %
5-40 SYRINGE (ML) INJECTION PRN
Status: DISCONTINUED | OUTPATIENT
Start: 2025-09-02 | End: 2025-09-06 | Stop reason: HOSPADM

## 2025-09-02 RX ORDER — ATORVASTATIN CALCIUM 40 MG/1
40 TABLET, FILM COATED ORAL NIGHTLY
Status: DISCONTINUED | OUTPATIENT
Start: 2025-09-02 | End: 2025-09-06 | Stop reason: HOSPADM

## 2025-09-02 RX ORDER — GABAPENTIN 300 MG/1
300 CAPSULE ORAL 3 TIMES DAILY
Status: DISCONTINUED | OUTPATIENT
Start: 2025-09-02 | End: 2025-09-06 | Stop reason: HOSPADM

## 2025-09-02 RX ORDER — 0.9 % SODIUM CHLORIDE 0.9 %
1000 INTRAVENOUS SOLUTION INTRAVENOUS ONCE
Status: COMPLETED | OUTPATIENT
Start: 2025-09-02 | End: 2025-09-03

## 2025-09-02 RX ORDER — SODIUM CHLORIDE 9 MG/ML
INJECTION, SOLUTION INTRAVENOUS PRN
Status: DISCONTINUED | OUTPATIENT
Start: 2025-09-02 | End: 2025-09-06 | Stop reason: HOSPADM

## 2025-09-02 RX ORDER — SENNOSIDES 8.6 MG
325 CAPSULE ORAL ONCE
Status: COMPLETED | OUTPATIENT
Start: 2025-09-02 | End: 2025-09-02

## 2025-09-02 RX ORDER — ALBUTEROL SULFATE 0.83 MG/ML
2.5 SOLUTION RESPIRATORY (INHALATION) 4 TIMES DAILY PRN
Status: DISCONTINUED | OUTPATIENT
Start: 2025-09-02 | End: 2025-09-06 | Stop reason: HOSPADM

## 2025-09-02 RX ORDER — POLYETHYLENE GLYCOL 3350 17 G/17G
17 POWDER, FOR SOLUTION ORAL DAILY PRN
Status: DISCONTINUED | OUTPATIENT
Start: 2025-09-02 | End: 2025-09-06 | Stop reason: HOSPADM

## 2025-09-02 RX ORDER — ONDANSETRON 4 MG/1
4 TABLET, ORALLY DISINTEGRATING ORAL EVERY 8 HOURS PRN
Status: DISCONTINUED | OUTPATIENT
Start: 2025-09-02 | End: 2025-09-06 | Stop reason: HOSPADM

## 2025-09-02 RX ORDER — IOPAMIDOL 755 MG/ML
105 INJECTION, SOLUTION INTRAVASCULAR
Status: COMPLETED | OUTPATIENT
Start: 2025-09-02 | End: 2025-09-02

## 2025-09-02 RX ORDER — POTASSIUM CHLORIDE 1500 MG/1
40 TABLET, EXTENDED RELEASE ORAL PRN
Status: DISCONTINUED | OUTPATIENT
Start: 2025-09-02 | End: 2025-09-06 | Stop reason: HOSPADM

## 2025-09-02 RX ORDER — ARFORMOTEROL TARTRATE 15 UG/2ML
15 SOLUTION RESPIRATORY (INHALATION)
Status: DISCONTINUED | OUTPATIENT
Start: 2025-09-02 | End: 2025-09-06 | Stop reason: HOSPADM

## 2025-09-02 RX ORDER — IOPAMIDOL 755 MG/ML
75 INJECTION, SOLUTION INTRAVASCULAR
Status: COMPLETED | OUTPATIENT
Start: 2025-09-02 | End: 2025-09-02

## 2025-09-02 RX ORDER — BENZONATATE 100 MG/1
100 CAPSULE ORAL 3 TIMES DAILY PRN
Status: DISCONTINUED | OUTPATIENT
Start: 2025-09-02 | End: 2025-09-06 | Stop reason: HOSPADM

## 2025-09-02 RX ORDER — ACETAMINOPHEN 650 MG/1
650 SUPPOSITORY RECTAL EVERY 6 HOURS PRN
Status: DISCONTINUED | OUTPATIENT
Start: 2025-09-02 | End: 2025-09-06 | Stop reason: HOSPADM

## 2025-09-02 RX ADMIN — ARFORMOTEROL TARTRATE 15 MCG: 15 SOLUTION RESPIRATORY (INHALATION) at 21:07

## 2025-09-02 RX ADMIN — ACETAMINOPHEN 650 MG: 325 TABLET ORAL at 19:50

## 2025-09-02 RX ADMIN — IOPAMIDOL 105 ML: 755 INJECTION, SOLUTION INTRAVENOUS at 12:39

## 2025-09-02 RX ADMIN — SODIUM CHLORIDE, PRESERVATIVE FREE 10 ML: 5 INJECTION INTRAVENOUS at 22:21

## 2025-09-02 RX ADMIN — ASPIRIN 325 MG: 325 TABLET, COATED ORAL at 13:31

## 2025-09-02 RX ADMIN — IPRATROPIUM BROMIDE 0.5 MG: 0.5 SOLUTION RESPIRATORY (INHALATION) at 21:11

## 2025-09-02 RX ADMIN — IOPAMIDOL 75 ML: 755 INJECTION, SOLUTION INTRAVENOUS at 19:15

## 2025-09-02 RX ADMIN — SODIUM CHLORIDE 1000 ML: 0.9 INJECTION, SOLUTION INTRAVENOUS at 22:29

## 2025-09-02 RX ADMIN — ATORVASTATIN CALCIUM 40 MG: 40 TABLET, FILM COATED ORAL at 20:37

## 2025-09-02 RX ADMIN — GABAPENTIN 300 MG: 300 CAPSULE ORAL at 20:37

## 2025-09-02 RX ADMIN — CLOPIDOGREL BISULFATE 300 MG: 300 TABLET, FILM COATED ORAL at 13:30

## 2025-09-02 RX ADMIN — POTASSIUM BICARBONATE 40 MEQ: 782 TABLET, EFFERVESCENT ORAL at 13:30

## 2025-09-02 ASSESSMENT — PAIN DESCRIPTION - LOCATION
LOCATION: LEG

## 2025-09-02 ASSESSMENT — PAIN DESCRIPTION - DESCRIPTORS: DESCRIPTORS: ACHING;THROBBING

## 2025-09-02 ASSESSMENT — PAIN SCALES - GENERAL
PAINLEVEL_OUTOF10: 0
PAINLEVEL_OUTOF10: 9

## 2025-09-02 ASSESSMENT — LIFESTYLE VARIABLES
HOW MANY STANDARD DRINKS CONTAINING ALCOHOL DO YOU HAVE ON A TYPICAL DAY: PATIENT DOES NOT DRINK
HOW OFTEN DO YOU HAVE A DRINK CONTAINING ALCOHOL: NEVER

## 2025-09-02 ASSESSMENT — PAIN - FUNCTIONAL ASSESSMENT
PAIN_FUNCTIONAL_ASSESSMENT: 0-10
PAIN_FUNCTIONAL_ASSESSMENT: 0-10

## 2025-09-02 ASSESSMENT — PAIN DESCRIPTION - ORIENTATION
ORIENTATION: RIGHT;LEFT

## 2025-09-03 ENCOUNTER — APPOINTMENT (OUTPATIENT)
Dept: MRI IMAGING | Age: 70
End: 2025-09-03
Payer: COMMERCIAL

## 2025-09-03 ENCOUNTER — APPOINTMENT (OUTPATIENT)
Dept: GENERAL RADIOLOGY | Age: 70
End: 2025-09-03
Payer: COMMERCIAL

## 2025-09-03 ENCOUNTER — APPOINTMENT (OUTPATIENT)
Dept: CT IMAGING | Age: 70
End: 2025-09-03
Payer: COMMERCIAL

## 2025-09-03 LAB
ALBUMIN SERPL-MCNC: 2.9 G/DL (ref 3.5–5.2)
ALP SERPL-CCNC: 33 U/L (ref 35–104)
ALT SERPL-CCNC: <5 U/L (ref 0–35)
ANION GAP SERPL CALCULATED.3IONS-SCNC: 11 MMOL/L (ref 7–16)
AST SERPL-CCNC: 17 U/L (ref 0–35)
B PARAP IS1001 DNA NPH QL NAA+NON-PROBE: NOT DETECTED
B PERT DNA SPEC QL NAA+PROBE: NOT DETECTED
BILIRUB SERPL-MCNC: 0.8 MG/DL (ref 0–1.2)
BNP SERPL-MCNC: 1292 PG/ML (ref 0–125)
BUN SERPL-MCNC: 34 MG/DL (ref 8–23)
C PNEUM DNA NPH QL NAA+NON-PROBE: NOT DETECTED
CALCIUM SERPL-MCNC: 7.8 MG/DL (ref 8.8–10.2)
CHLORIDE SERPL-SCNC: 104 MMOL/L (ref 98–107)
CHOLEST SERPL-MCNC: 125 MG/DL
CO2 SERPL-SCNC: 27 MMOL/L (ref 22–29)
CREAT SERPL-MCNC: 1.5 MG/DL (ref 0.5–1)
FLUAV RNA NPH QL NAA+NON-PROBE: NOT DETECTED
FLUBV RNA NPH QL NAA+NON-PROBE: NOT DETECTED
GFR, ESTIMATED: 37 ML/MIN/1.73M2
GLUCOSE SERPL-MCNC: 159 MG/DL (ref 74–99)
HADV DNA NPH QL NAA+NON-PROBE: NOT DETECTED
HBA1C MFR BLD: 7.1 % (ref 4–5.6)
HCOV 229E RNA NPH QL NAA+NON-PROBE: NOT DETECTED
HCOV HKU1 RNA NPH QL NAA+NON-PROBE: NOT DETECTED
HCOV NL63 RNA NPH QL NAA+NON-PROBE: NOT DETECTED
HCOV OC43 RNA NPH QL NAA+NON-PROBE: NOT DETECTED
HDLC SERPL-MCNC: 18 MG/DL
HMPV RNA NPH QL NAA+NON-PROBE: NOT DETECTED
HPIV1 RNA NPH QL NAA+NON-PROBE: NOT DETECTED
HPIV2 RNA NPH QL NAA+NON-PROBE: NOT DETECTED
HPIV3 RNA NPH QL NAA+NON-PROBE: NOT DETECTED
HPIV4 RNA NPH QL NAA+NON-PROBE: NOT DETECTED
L PNEUMO1 AG UR QL IA.RAPID: NEGATIVE
LDLC SERPL CALC-MCNC: 79 MG/DL
M PNEUMO DNA NPH QL NAA+NON-PROBE: NOT DETECTED
MAGNESIUM SERPL-MCNC: 2.1 MG/DL (ref 1.6–2.4)
PHOSPHATE SERPL-MCNC: 4.5 MG/DL (ref 2.5–4.5)
POTASSIUM SERPL-SCNC: 3.3 MMOL/L (ref 3.5–5.1)
PROCALCITONIN SERPL-MCNC: 1.06 NG/ML (ref 0–0.08)
PROT SERPL-MCNC: 6.2 G/DL (ref 6.4–8.3)
RSV RNA NPH QL NAA+NON-PROBE: NOT DETECTED
RV+EV RNA NPH QL NAA+NON-PROBE: NOT DETECTED
S PNEUM AG SPEC QL: NEGATIVE
SARS-COV-2 RNA NPH QL NAA+NON-PROBE: NOT DETECTED
SODIUM SERPL-SCNC: 142 MMOL/L (ref 136–145)
SPECIMEN DESCRIPTION: NORMAL
SPECIMEN SOURCE: NORMAL
TRIGL SERPL-MCNC: 141 MG/DL
TSH SERPL DL<=0.05 MIU/L-ACNC: 1.34 UIU/ML (ref 0.27–4.2)
VLDLC SERPL CALC-MCNC: 28 MG/DL

## 2025-09-03 PROCEDURE — 2140000000 HC CCU INTERMEDIATE R&B

## 2025-09-03 PROCEDURE — 6370000000 HC RX 637 (ALT 250 FOR IP): Performed by: NURSE PRACTITIONER

## 2025-09-03 PROCEDURE — 2500000003 HC RX 250 WO HCPCS: Performed by: STUDENT IN AN ORGANIZED HEALTH CARE EDUCATION/TRAINING PROGRAM

## 2025-09-03 PROCEDURE — 94660 CPAP INITIATION&MGMT: CPT

## 2025-09-03 PROCEDURE — 84145 PROCALCITONIN (PCT): CPT

## 2025-09-03 PROCEDURE — 2700000000 HC OXYGEN THERAPY PER DAY

## 2025-09-03 PROCEDURE — 6360000002 HC RX W HCPCS: Performed by: INTERNAL MEDICINE

## 2025-09-03 PROCEDURE — 97165 OT EVAL LOW COMPLEX 30 MIN: CPT

## 2025-09-03 PROCEDURE — 87899 AGENT NOS ASSAY W/OPTIC: CPT

## 2025-09-03 PROCEDURE — 94640 AIRWAY INHALATION TREATMENT: CPT

## 2025-09-03 PROCEDURE — 71045 X-RAY EXAM CHEST 1 VIEW: CPT

## 2025-09-03 PROCEDURE — 80061 LIPID PANEL: CPT

## 2025-09-03 PROCEDURE — 83735 ASSAY OF MAGNESIUM: CPT

## 2025-09-03 PROCEDURE — 97530 THERAPEUTIC ACTIVITIES: CPT

## 2025-09-03 PROCEDURE — 87449 NOS EACH ORGANISM AG IA: CPT

## 2025-09-03 PROCEDURE — 80053 COMPREHEN METABOLIC PANEL: CPT

## 2025-09-03 PROCEDURE — 2500000003 HC RX 250 WO HCPCS: Performed by: INTERNAL MEDICINE

## 2025-09-03 PROCEDURE — 71250 CT THORAX DX C-: CPT

## 2025-09-03 PROCEDURE — 83036 HEMOGLOBIN GLYCOSYLATED A1C: CPT

## 2025-09-03 PROCEDURE — 87040 BLOOD CULTURE FOR BACTERIA: CPT

## 2025-09-03 PROCEDURE — 84443 ASSAY THYROID STIM HORMONE: CPT

## 2025-09-03 PROCEDURE — 92523 SPEECH SOUND LANG COMPREHEN: CPT

## 2025-09-03 PROCEDURE — 2580000003 HC RX 258: Performed by: NURSE PRACTITIONER

## 2025-09-03 PROCEDURE — 92610 EVALUATE SWALLOWING FUNCTION: CPT

## 2025-09-03 PROCEDURE — 2580000003 HC RX 258: Performed by: STUDENT IN AN ORGANIZED HEALTH CARE EDUCATION/TRAINING PROGRAM

## 2025-09-03 PROCEDURE — 83880 ASSAY OF NATRIURETIC PEPTIDE: CPT

## 2025-09-03 PROCEDURE — 36415 COLL VENOUS BLD VENIPUNCTURE: CPT

## 2025-09-03 PROCEDURE — 6370000000 HC RX 637 (ALT 250 FOR IP): Performed by: STUDENT IN AN ORGANIZED HEALTH CARE EDUCATION/TRAINING PROGRAM

## 2025-09-03 PROCEDURE — 6370000000 HC RX 637 (ALT 250 FOR IP): Performed by: INTERNAL MEDICINE

## 2025-09-03 PROCEDURE — 99232 SBSQ HOSP IP/OBS MODERATE 35: CPT | Performed by: STUDENT IN AN ORGANIZED HEALTH CARE EDUCATION/TRAINING PROGRAM

## 2025-09-03 PROCEDURE — 6360000002 HC RX W HCPCS: Performed by: STUDENT IN AN ORGANIZED HEALTH CARE EDUCATION/TRAINING PROGRAM

## 2025-09-03 PROCEDURE — 0202U NFCT DS 22 TRGT SARS-COV-2: CPT

## 2025-09-03 PROCEDURE — 70551 MRI BRAIN STEM W/O DYE: CPT

## 2025-09-03 PROCEDURE — 97161 PT EVAL LOW COMPLEX 20 MIN: CPT

## 2025-09-03 PROCEDURE — 84100 ASSAY OF PHOSPHORUS: CPT

## 2025-09-03 RX ORDER — SODIUM CHLORIDE 9 MG/ML
INJECTION, SOLUTION INTRAVENOUS ONCE
Status: COMPLETED | OUTPATIENT
Start: 2025-09-03 | End: 2025-09-03

## 2025-09-03 RX ORDER — GUAIFENESIN 400 MG/1
400 TABLET ORAL 3 TIMES DAILY
Status: DISCONTINUED | OUTPATIENT
Start: 2025-09-03 | End: 2025-09-06 | Stop reason: HOSPADM

## 2025-09-03 RX ORDER — MIDODRINE HYDROCHLORIDE 5 MG/1
5 TABLET ORAL
Status: DISCONTINUED | OUTPATIENT
Start: 2025-09-03 | End: 2025-09-04

## 2025-09-03 RX ORDER — SODIUM CHLORIDE, SODIUM LACTATE, POTASSIUM CHLORIDE, CALCIUM CHLORIDE 600; 310; 30; 20 MG/100ML; MG/100ML; MG/100ML; MG/100ML
INJECTION, SOLUTION INTRAVENOUS CONTINUOUS
Status: ACTIVE | OUTPATIENT
Start: 2025-09-03 | End: 2025-09-04

## 2025-09-03 RX ORDER — BUDESONIDE 0.5 MG/2ML
0.5 INHALANT ORAL
Status: DISCONTINUED | OUTPATIENT
Start: 2025-09-03 | End: 2025-09-06 | Stop reason: HOSPADM

## 2025-09-03 RX ORDER — MIDODRINE HYDROCHLORIDE 5 MG/1
5 TABLET ORAL ONCE
Status: COMPLETED | OUTPATIENT
Start: 2025-09-03 | End: 2025-09-03

## 2025-09-03 RX ORDER — 0.9 % SODIUM CHLORIDE 0.9 %
500 INTRAVENOUS SOLUTION INTRAVENOUS ONCE
Status: COMPLETED | OUTPATIENT
Start: 2025-09-03 | End: 2025-09-03

## 2025-09-03 RX ORDER — GUAIFENESIN 600 MG/1
600 TABLET, EXTENDED RELEASE ORAL 2 TIMES DAILY
Status: DISCONTINUED | OUTPATIENT
Start: 2025-09-03 | End: 2025-09-03 | Stop reason: CLARIF

## 2025-09-03 RX ADMIN — IPRATROPIUM BROMIDE 0.5 MG: 0.5 SOLUTION RESPIRATORY (INHALATION) at 12:23

## 2025-09-03 RX ADMIN — BUDESONIDE 500 MCG: 0.5 SUSPENSION RESPIRATORY (INHALATION) at 20:51

## 2025-09-03 RX ADMIN — SODIUM CHLORIDE, PRESERVATIVE FREE 10 ML: 5 INJECTION INTRAVENOUS at 22:27

## 2025-09-03 RX ADMIN — ATORVASTATIN CALCIUM 40 MG: 40 TABLET, FILM COATED ORAL at 22:27

## 2025-09-03 RX ADMIN — SODIUM CHLORIDE: 0.9 INJECTION, SOLUTION INTRAVENOUS at 09:51

## 2025-09-03 RX ADMIN — GUAIFENESIN 400 MG: 400 TABLET ORAL at 14:39

## 2025-09-03 RX ADMIN — WATER 40 MG: 1 INJECTION INTRAMUSCULAR; INTRAVENOUS; SUBCUTANEOUS at 09:38

## 2025-09-03 RX ADMIN — GABAPENTIN 300 MG: 300 CAPSULE ORAL at 22:27

## 2025-09-03 RX ADMIN — SODIUM CHLORIDE 500 ML: 0.9 INJECTION, SOLUTION INTRAVENOUS at 04:28

## 2025-09-03 RX ADMIN — POTASSIUM CHLORIDE 40 MEQ: 1500 TABLET, EXTENDED RELEASE ORAL at 06:02

## 2025-09-03 RX ADMIN — DOXYCYCLINE 100 MG: 100 INJECTION, POWDER, LYOPHILIZED, FOR SOLUTION INTRAVENOUS at 09:57

## 2025-09-03 RX ADMIN — IPRATROPIUM BROMIDE 0.5 MG: 0.5 SOLUTION RESPIRATORY (INHALATION) at 20:51

## 2025-09-03 RX ADMIN — GUAIFENESIN 400 MG: 400 TABLET ORAL at 22:27

## 2025-09-03 RX ADMIN — ARFORMOTEROL TARTRATE 15 MCG: 15 SOLUTION RESPIRATORY (INHALATION) at 07:52

## 2025-09-03 RX ADMIN — METHYLPREDNISOLONE SODIUM SUCCINATE 40 MG: 40 INJECTION, POWDER, LYOPHILIZED, FOR SOLUTION INTRAMUSCULAR; INTRAVENOUS at 22:27

## 2025-09-03 RX ADMIN — MIDODRINE HYDROCHLORIDE 5 MG: 5 TABLET ORAL at 16:56

## 2025-09-03 RX ADMIN — MIDODRINE HYDROCHLORIDE 5 MG: 5 TABLET ORAL at 00:12

## 2025-09-03 RX ADMIN — ENOXAPARIN SODIUM 40 MG: 100 INJECTION SUBCUTANEOUS at 08:00

## 2025-09-03 RX ADMIN — IPRATROPIUM BROMIDE 0.5 MG: 0.5 SOLUTION RESPIRATORY (INHALATION) at 01:20

## 2025-09-03 RX ADMIN — ASPIRIN 81 MG: 81 TABLET, COATED ORAL at 08:00

## 2025-09-03 RX ADMIN — GABAPENTIN 300 MG: 300 CAPSULE ORAL at 14:15

## 2025-09-03 RX ADMIN — IPRATROPIUM BROMIDE 0.5 MG: 0.5 SOLUTION RESPIRATORY (INHALATION) at 07:52

## 2025-09-03 RX ADMIN — ACETAMINOPHEN 650 MG: 325 TABLET ORAL at 05:52

## 2025-09-03 RX ADMIN — SODIUM CHLORIDE, PRESERVATIVE FREE 10 ML: 5 INJECTION INTRAVENOUS at 08:00

## 2025-09-03 RX ADMIN — SODIUM CHLORIDE, SODIUM LACTATE, POTASSIUM CHLORIDE, AND CALCIUM CHLORIDE: .6; .31; .03; .02 INJECTION, SOLUTION INTRAVENOUS at 11:51

## 2025-09-03 RX ADMIN — GUAIFENESIN 400 MG: 400 TABLET ORAL at 11:49

## 2025-09-03 RX ADMIN — GABAPENTIN 300 MG: 300 CAPSULE ORAL at 08:00

## 2025-09-03 RX ADMIN — ARFORMOTEROL TARTRATE 15 MCG: 15 SOLUTION RESPIRATORY (INHALATION) at 20:51

## 2025-09-03 RX ADMIN — WATER 1000 MG: 1 INJECTION INTRAMUSCULAR; INTRAVENOUS; SUBCUTANEOUS at 09:37

## 2025-09-03 RX ADMIN — DOXYCYCLINE 100 MG: 100 INJECTION, POWDER, LYOPHILIZED, FOR SOLUTION INTRAVENOUS at 22:31

## 2025-09-03 ASSESSMENT — PAIN SCALES - GENERAL
PAINLEVEL_OUTOF10: 0

## 2025-09-04 ENCOUNTER — APPOINTMENT (OUTPATIENT)
Age: 70
End: 2025-09-04
Payer: COMMERCIAL

## 2025-09-04 PROBLEM — I63.9 ACUTE ISCHEMIC STROKE (HCC): Status: ACTIVE | Noted: 2025-09-04

## 2025-09-04 PROBLEM — R47.01 APHASIA: Status: RESOLVED | Noted: 2025-09-02 | Resolved: 2025-09-04

## 2025-09-04 PROBLEM — G45.9 TIA (TRANSIENT ISCHEMIC ATTACK): Status: RESOLVED | Noted: 2025-09-02 | Resolved: 2025-09-04

## 2025-09-04 LAB
ALBUMIN SERPL-MCNC: 2.9 G/DL (ref 3.5–5.2)
ALP SERPL-CCNC: 49 U/L (ref 35–104)
ALT SERPL-CCNC: 11 U/L (ref 0–35)
ANION GAP SERPL CALCULATED.3IONS-SCNC: 12 MMOL/L (ref 7–16)
AST SERPL-CCNC: 37 U/L (ref 0–35)
BASOPHILS # BLD: 0.01 K/UL (ref 0–0.2)
BASOPHILS NFR BLD: 0 % (ref 0–2)
BILIRUB SERPL-MCNC: 0.3 MG/DL (ref 0–1.2)
BUN SERPL-MCNC: 28 MG/DL (ref 8–23)
CALCIUM SERPL-MCNC: 8.4 MG/DL (ref 8.8–10.2)
CHLORIDE SERPL-SCNC: 102 MMOL/L (ref 98–107)
CO2 SERPL-SCNC: 25 MMOL/L (ref 22–29)
CREAT SERPL-MCNC: 1.1 MG/DL (ref 0.5–1)
ECHO BSA: 1.78 M2
ECHO EST RA PRESSURE: 3 MMHG
ECHO LA DIAMETER INDEX: 2.71 CM/M2
ECHO LA DIAMETER: 4.8 CM
ECHO LA VOL A-L A2C: 81 ML (ref 22–52)
ECHO LA VOL A-L A4C: 78 ML (ref 22–52)
ECHO LA VOL BP: 81 ML (ref 22–52)
ECHO LA VOL MOD A2C: 80 ML (ref 22–52)
ECHO LA VOL MOD A4C: 76 ML (ref 22–52)
ECHO LA VOL/BSA BIPLANE: 46 ML/M2 (ref 16–34)
ECHO LA VOLUME AREA LENGTH: 83 ML
ECHO LA VOLUME INDEX A-L A2C: 46 ML/M2 (ref 16–34)
ECHO LA VOLUME INDEX A-L A4C: 44 ML/M2 (ref 16–34)
ECHO LA VOLUME INDEX AREA LENGTH: 47 ML/M2 (ref 16–34)
ECHO LA VOLUME INDEX MOD A2C: 45 ML/M2 (ref 16–34)
ECHO LA VOLUME INDEX MOD A4C: 43 ML/M2 (ref 16–34)
ECHO LV EF PHYSICIAN: 65 %
ECHO LV FRACTIONAL SHORTENING: 43 % (ref 28–44)
ECHO LV INTERNAL DIMENSION DIASTOLE INDEX: 2.66 CM/M2
ECHO LV INTERNAL DIMENSION DIASTOLIC: 4.7 CM (ref 3.9–5.3)
ECHO LV INTERNAL DIMENSION SYSTOLIC INDEX: 1.53 CM/M2
ECHO LV INTERNAL DIMENSION SYSTOLIC: 2.7 CM
ECHO LV IVSD: 1.2 CM (ref 0.6–0.9)
ECHO LV IVSS: 2 CM
ECHO LV MASS 2D: 212 G (ref 67–162)
ECHO LV MASS INDEX 2D: 119.8 G/M2 (ref 43–95)
ECHO LV POSTERIOR WALL DIASTOLIC: 1.2 CM (ref 0.6–0.9)
ECHO LV POSTERIOR WALL SYSTOLIC: 1.6 CM
ECHO LV RELATIVE WALL THICKNESS RATIO: 0.51
ECHO MV A VELOCITY: 0.84 M/S
ECHO MV E DECELERATION TIME (DT): 221.1 MS
ECHO MV E VELOCITY: 1.11 M/S
ECHO MV E/A RATIO: 1.32
ECHO RV INTERNAL DIMENSION: 3.1 CM
ECHO RV TAPSE: 2.1 CM (ref 1.7–?)
EOSINOPHIL # BLD: 0 K/UL (ref 0.05–0.5)
EOSINOPHILS RELATIVE PERCENT: 0 % (ref 0–6)
ERYTHROCYTE [DISTWIDTH] IN BLOOD BY AUTOMATED COUNT: 18.2 % (ref 11.5–15)
GFR, ESTIMATED: 56 ML/MIN/1.73M2
GLUCOSE BLD-MCNC: 258 MG/DL (ref 74–99)
GLUCOSE BLD-MCNC: 370 MG/DL (ref 74–99)
GLUCOSE BLD-MCNC: 392 MG/DL (ref 74–99)
GLUCOSE SERPL-MCNC: 306 MG/DL (ref 74–99)
HCT VFR BLD AUTO: 48.2 % (ref 34–48)
HGB BLD-MCNC: 14.3 G/DL (ref 11.5–15.5)
IMM GRANULOCYTES # BLD AUTO: 0.04 K/UL (ref 0–0.58)
IMM GRANULOCYTES NFR BLD: 1 % (ref 0–5)
LYMPHOCYTES NFR BLD: 0.37 K/UL (ref 1.5–4)
LYMPHOCYTES RELATIVE PERCENT: 6 % (ref 20–42)
MCH RBC QN AUTO: 27.7 PG (ref 26–35)
MCHC RBC AUTO-ENTMCNC: 29.7 G/DL (ref 32–34.5)
MCV RBC AUTO: 93.4 FL (ref 80–99.9)
MICROORGANISM SPEC CULT: ABNORMAL
MONOCYTES NFR BLD: 0.18 K/UL (ref 0.1–0.95)
MONOCYTES NFR BLD: 3 % (ref 2–12)
NEUTROPHILS NFR BLD: 90 % (ref 43–80)
NEUTS SEG NFR BLD: 5.17 K/UL (ref 1.8–7.3)
PLATELET # BLD AUTO: 198 K/UL (ref 130–450)
PMV BLD AUTO: 10.7 FL (ref 7–12)
POTASSIUM SERPL-SCNC: 4.2 MMOL/L (ref 3.5–5.1)
PROT SERPL-MCNC: 7 G/DL (ref 6.4–8.3)
RBC # BLD AUTO: 5.16 M/UL (ref 3.5–5.5)
RBC # BLD: ABNORMAL 10*6/UL
SERVICE CMNT-IMP: ABNORMAL
SODIUM SERPL-SCNC: 138 MMOL/L (ref 136–145)
SPECIMEN DESCRIPTION: ABNORMAL
WBC OTHER # BLD: 5.8 K/UL (ref 4.5–11.5)

## 2025-09-04 PROCEDURE — 6360000002 HC RX W HCPCS: Performed by: INTERNAL MEDICINE

## 2025-09-04 PROCEDURE — 6370000000 HC RX 637 (ALT 250 FOR IP): Performed by: STUDENT IN AN ORGANIZED HEALTH CARE EDUCATION/TRAINING PROGRAM

## 2025-09-04 PROCEDURE — 97129 THER IVNTJ 1ST 15 MIN: CPT

## 2025-09-04 PROCEDURE — 99232 SBSQ HOSP IP/OBS MODERATE 35: CPT | Performed by: NURSE PRACTITIONER

## 2025-09-04 PROCEDURE — 2580000003 HC RX 258: Performed by: STUDENT IN AN ORGANIZED HEALTH CARE EDUCATION/TRAINING PROGRAM

## 2025-09-04 PROCEDURE — 93321 DOPPLER ECHO F-UP/LMTD STD: CPT

## 2025-09-04 PROCEDURE — 94660 CPAP INITIATION&MGMT: CPT

## 2025-09-04 PROCEDURE — 2500000003 HC RX 250 WO HCPCS: Performed by: INTERNAL MEDICINE

## 2025-09-04 PROCEDURE — 6370000000 HC RX 637 (ALT 250 FOR IP): Performed by: NURSE PRACTITIONER

## 2025-09-04 PROCEDURE — 6370000000 HC RX 637 (ALT 250 FOR IP): Performed by: INTERNAL MEDICINE

## 2025-09-04 PROCEDURE — 36415 COLL VENOUS BLD VENIPUNCTURE: CPT

## 2025-09-04 PROCEDURE — 2700000000 HC OXYGEN THERAPY PER DAY

## 2025-09-04 PROCEDURE — 2500000003 HC RX 250 WO HCPCS: Performed by: STUDENT IN AN ORGANIZED HEALTH CARE EDUCATION/TRAINING PROGRAM

## 2025-09-04 PROCEDURE — 85025 COMPLETE CBC W/AUTO DIFF WBC: CPT

## 2025-09-04 PROCEDURE — 94640 AIRWAY INHALATION TREATMENT: CPT

## 2025-09-04 PROCEDURE — 93306 TTE W/DOPPLER COMPLETE: CPT | Performed by: INTERNAL MEDICINE

## 2025-09-04 PROCEDURE — 99232 SBSQ HOSP IP/OBS MODERATE 35: CPT | Performed by: INTERNAL MEDICINE

## 2025-09-04 PROCEDURE — 97130 THER IVNTJ EA ADDL 15 MIN: CPT

## 2025-09-04 PROCEDURE — 2140000000 HC CCU INTERMEDIATE R&B

## 2025-09-04 PROCEDURE — 6360000002 HC RX W HCPCS: Performed by: STUDENT IN AN ORGANIZED HEALTH CARE EDUCATION/TRAINING PROGRAM

## 2025-09-04 PROCEDURE — 80053 COMPREHEN METABOLIC PANEL: CPT

## 2025-09-04 PROCEDURE — 82962 GLUCOSE BLOOD TEST: CPT

## 2025-09-04 RX ORDER — DIPHENHYDRAMINE HCL 25 MG
25 TABLET ORAL ONCE
Status: COMPLETED | OUTPATIENT
Start: 2025-09-04 | End: 2025-09-04

## 2025-09-04 RX ORDER — INSULIN LISPRO 100 [IU]/ML
0-4 INJECTION, SOLUTION INTRAVENOUS; SUBCUTANEOUS
Status: DISCONTINUED | OUTPATIENT
Start: 2025-09-04 | End: 2025-09-06 | Stop reason: HOSPADM

## 2025-09-04 RX ORDER — INSULIN LISPRO 100 [IU]/ML
5 INJECTION, SOLUTION INTRAVENOUS; SUBCUTANEOUS ONCE
Status: COMPLETED | OUTPATIENT
Start: 2025-09-04 | End: 2025-09-04

## 2025-09-04 RX ORDER — GLUCAGON 1 MG/ML
1 KIT INJECTION PRN
Status: DISCONTINUED | OUTPATIENT
Start: 2025-09-04 | End: 2025-09-06 | Stop reason: HOSPADM

## 2025-09-04 RX ORDER — CEFDINIR 300 MG/1
300 CAPSULE ORAL EVERY 12 HOURS SCHEDULED
Status: DISCONTINUED | OUTPATIENT
Start: 2025-09-04 | End: 2025-09-04

## 2025-09-04 RX ORDER — CEFDINIR 300 MG/1
300 CAPSULE ORAL EVERY 12 HOURS SCHEDULED
Status: DISCONTINUED | OUTPATIENT
Start: 2025-09-04 | End: 2025-09-06 | Stop reason: HOSPADM

## 2025-09-04 RX ORDER — MIDODRINE HYDROCHLORIDE 2.5 MG/1
2.5 TABLET ORAL
Status: DISCONTINUED | OUTPATIENT
Start: 2025-09-04 | End: 2025-09-05

## 2025-09-04 RX ORDER — DOXYCYCLINE 100 MG/1
100 CAPSULE ORAL EVERY 12 HOURS SCHEDULED
Status: DISCONTINUED | OUTPATIENT
Start: 2025-09-04 | End: 2025-09-06 | Stop reason: HOSPADM

## 2025-09-04 RX ORDER — SODIUM CHLORIDE 9 MG/ML
INJECTION, SOLUTION INTRAVENOUS CONTINUOUS
Status: DISCONTINUED | OUTPATIENT
Start: 2025-09-05 | End: 2025-09-05

## 2025-09-04 RX ORDER — PREDNISONE 10 MG/1
TABLET ORAL
Qty: 18 TABLET | Refills: 0 | Status: SHIPPED | OUTPATIENT
Start: 2025-09-05

## 2025-09-04 RX ORDER — CLOPIDOGREL BISULFATE 75 MG/1
75 TABLET ORAL DAILY
Status: DISCONTINUED | OUTPATIENT
Start: 2025-09-04 | End: 2025-09-06 | Stop reason: HOSPADM

## 2025-09-04 RX ORDER — DEXTROSE MONOHYDRATE 100 MG/ML
INJECTION, SOLUTION INTRAVENOUS CONTINUOUS PRN
Status: DISCONTINUED | OUTPATIENT
Start: 2025-09-04 | End: 2025-09-06 | Stop reason: HOSPADM

## 2025-09-04 RX ADMIN — SODIUM CHLORIDE, PRESERVATIVE FREE 10 ML: 5 INJECTION INTRAVENOUS at 09:22

## 2025-09-04 RX ADMIN — GABAPENTIN 300 MG: 300 CAPSULE ORAL at 09:21

## 2025-09-04 RX ADMIN — IPRATROPIUM BROMIDE 0.5 MG: 0.5 SOLUTION RESPIRATORY (INHALATION) at 20:26

## 2025-09-04 RX ADMIN — ARFORMOTEROL TARTRATE 15 MCG: 15 SOLUTION RESPIRATORY (INHALATION) at 12:13

## 2025-09-04 RX ADMIN — DIPHENHYDRAMINE HYDROCHLORIDE 25 MG: 25 TABLET ORAL at 21:54

## 2025-09-04 RX ADMIN — MIDODRINE HYDROCHLORIDE 5 MG: 5 TABLET ORAL at 09:22

## 2025-09-04 RX ADMIN — GUAIFENESIN 400 MG: 400 TABLET ORAL at 09:21

## 2025-09-04 RX ADMIN — ENOXAPARIN SODIUM 40 MG: 100 INJECTION SUBCUTANEOUS at 09:22

## 2025-09-04 RX ADMIN — METHYLPREDNISOLONE SODIUM SUCCINATE 40 MG: 40 INJECTION, POWDER, LYOPHILIZED, FOR SOLUTION INTRAMUSCULAR; INTRAVENOUS at 11:52

## 2025-09-04 RX ADMIN — INSULIN LISPRO 2 UNITS: 100 INJECTION, SOLUTION INTRAVENOUS; SUBCUTANEOUS at 12:21

## 2025-09-04 RX ADMIN — GUAIFENESIN 400 MG: 400 TABLET ORAL at 21:36

## 2025-09-04 RX ADMIN — GABAPENTIN 300 MG: 300 CAPSULE ORAL at 15:39

## 2025-09-04 RX ADMIN — BUDESONIDE 500 MCG: 0.5 SUSPENSION RESPIRATORY (INHALATION) at 12:13

## 2025-09-04 RX ADMIN — DOXYCYCLINE 100 MG: 100 INJECTION, POWDER, LYOPHILIZED, FOR SOLUTION INTRAVENOUS at 09:32

## 2025-09-04 RX ADMIN — CLOPIDOGREL BISULFATE 75 MG: 75 TABLET, FILM COATED ORAL at 12:21

## 2025-09-04 RX ADMIN — GABAPENTIN 300 MG: 300 CAPSULE ORAL at 21:36

## 2025-09-04 RX ADMIN — GUAIFENESIN 400 MG: 400 TABLET ORAL at 15:39

## 2025-09-04 RX ADMIN — CEFDINIR 300 MG: 300 CAPSULE ORAL at 21:54

## 2025-09-04 RX ADMIN — INSULIN LISPRO 5 UNITS: 100 INJECTION, SOLUTION INTRAVENOUS; SUBCUTANEOUS at 18:25

## 2025-09-04 RX ADMIN — IPRATROPIUM BROMIDE 0.5 MG: 0.5 SOLUTION RESPIRATORY (INHALATION) at 00:51

## 2025-09-04 RX ADMIN — WATER 1000 MG: 1 INJECTION INTRAMUSCULAR; INTRAVENOUS; SUBCUTANEOUS at 09:22

## 2025-09-04 RX ADMIN — ATORVASTATIN CALCIUM 40 MG: 40 TABLET, FILM COATED ORAL at 21:36

## 2025-09-04 RX ADMIN — METHYLPREDNISOLONE SODIUM SUCCINATE 40 MG: 40 INJECTION, POWDER, LYOPHILIZED, FOR SOLUTION INTRAMUSCULAR; INTRAVENOUS at 21:41

## 2025-09-04 RX ADMIN — INSULIN LISPRO 4 UNITS: 100 INJECTION, SOLUTION INTRAVENOUS; SUBCUTANEOUS at 18:26

## 2025-09-04 RX ADMIN — ASPIRIN 81 MG: 81 TABLET, COATED ORAL at 09:21

## 2025-09-04 RX ADMIN — BUDESONIDE 500 MCG: 0.5 SUSPENSION RESPIRATORY (INHALATION) at 20:26

## 2025-09-04 RX ADMIN — DOXYCYCLINE HYCLATE 100 MG: 100 CAPSULE ORAL at 21:36

## 2025-09-04 RX ADMIN — MIDODRINE HYDROCHLORIDE 2.5 MG: 2.5 TABLET ORAL at 11:52

## 2025-09-04 RX ADMIN — SODIUM CHLORIDE, PRESERVATIVE FREE 10 ML: 5 INJECTION INTRAVENOUS at 21:37

## 2025-09-04 RX ADMIN — IPRATROPIUM BROMIDE 0.5 MG: 0.5 SOLUTION RESPIRATORY (INHALATION) at 12:13

## 2025-09-04 RX ADMIN — ARFORMOTEROL TARTRATE 15 MCG: 15 SOLUTION RESPIRATORY (INHALATION) at 20:26

## 2025-09-04 RX ADMIN — INSULIN LISPRO 4 UNITS: 100 INJECTION, SOLUTION INTRAVENOUS; SUBCUTANEOUS at 21:55

## 2025-09-04 ASSESSMENT — PAIN SCALES - GENERAL
PAINLEVEL_OUTOF10: 0
PAINLEVEL_OUTOF10: 0

## 2025-09-05 ENCOUNTER — APPOINTMENT (OUTPATIENT)
Dept: INTERVENTIONAL RADIOLOGY/VASCULAR | Age: 70
End: 2025-09-05
Payer: COMMERCIAL

## 2025-09-05 PROBLEM — I67.2 INTRACRANIAL ATHEROSCLEROSIS: Status: ACTIVE | Noted: 2025-09-05

## 2025-09-05 PROBLEM — R41.82 ALTERED MENTAL STATUS: Status: RESOLVED | Noted: 2025-09-02 | Resolved: 2025-09-05

## 2025-09-05 LAB
ANION GAP SERPL CALCULATED.3IONS-SCNC: 10 MMOL/L (ref 7–16)
BASOPHILS # BLD: 0 K/UL (ref 0–0.2)
BASOPHILS NFR BLD: 0 % (ref 0–2)
BUN SERPL-MCNC: 26 MG/DL (ref 8–23)
CALCIUM SERPL-MCNC: 8.4 MG/DL (ref 8.8–10.2)
CHLORIDE SERPL-SCNC: 102 MMOL/L (ref 98–107)
CO2 SERPL-SCNC: 24 MMOL/L (ref 22–29)
CREAT SERPL-MCNC: 1 MG/DL (ref 0.5–1)
EOSINOPHIL # BLD: 0 K/UL (ref 0.05–0.5)
EOSINOPHILS RELATIVE PERCENT: 0 % (ref 0–6)
ERYTHROCYTE [DISTWIDTH] IN BLOOD BY AUTOMATED COUNT: 17.3 % (ref 11.5–15)
GFR, ESTIMATED: 59 ML/MIN/1.73M2
GLUCOSE BLD-MCNC: 130 MG/DL (ref 74–99)
GLUCOSE BLD-MCNC: 223 MG/DL (ref 74–99)
GLUCOSE BLD-MCNC: 240 MG/DL (ref 74–99)
GLUCOSE SERPL-MCNC: 253 MG/DL (ref 74–99)
HCT VFR BLD AUTO: 44.5 % (ref 34–48)
HGB BLD-MCNC: 13.7 G/DL (ref 11.5–15.5)
LYMPHOCYTES NFR BLD: 0.42 K/UL (ref 1.5–4)
LYMPHOCYTES RELATIVE PERCENT: 5 % (ref 20–42)
MCH RBC QN AUTO: 27.7 PG (ref 26–35)
MCHC RBC AUTO-ENTMCNC: 30.8 G/DL (ref 32–34.5)
MCV RBC AUTO: 89.9 FL (ref 80–99.9)
MONOCYTES NFR BLD: 0.07 K/UL (ref 0.1–0.95)
MONOCYTES NFR BLD: 1 % (ref 2–12)
NEUTROPHILS NFR BLD: 94 % (ref 43–80)
NEUTS SEG NFR BLD: 7.61 K/UL (ref 1.8–7.3)
PLATELET # BLD AUTO: 213 K/UL (ref 130–450)
PMV BLD AUTO: 10.6 FL (ref 7–12)
POTASSIUM SERPL-SCNC: 4 MMOL/L (ref 3.5–5.1)
RBC # BLD AUTO: 4.95 M/UL (ref 3.5–5.5)
RBC # BLD: ABNORMAL 10*6/UL
SODIUM SERPL-SCNC: 136 MMOL/L (ref 136–145)
WBC OTHER # BLD: 8.1 K/UL (ref 4.5–11.5)

## 2025-09-05 PROCEDURE — 6370000000 HC RX 637 (ALT 250 FOR IP): Performed by: NURSE PRACTITIONER

## 2025-09-05 PROCEDURE — 2500000003 HC RX 250 WO HCPCS: Performed by: INTERNAL MEDICINE

## 2025-09-05 PROCEDURE — C1725 CATH, TRANSLUMIN NON-LASER: HCPCS

## 2025-09-05 PROCEDURE — 85347 COAGULATION TIME ACTIVATED: CPT

## 2025-09-05 PROCEDURE — 6370000000 HC RX 637 (ALT 250 FOR IP): Performed by: INTERNAL MEDICINE

## 2025-09-05 PROCEDURE — 97530 THERAPEUTIC ACTIVITIES: CPT

## 2025-09-05 PROCEDURE — 2000000000 HC ICU R&B

## 2025-09-05 PROCEDURE — 97129 THER IVNTJ 1ST 15 MIN: CPT

## 2025-09-05 PROCEDURE — 82962 GLUCOSE BLOOD TEST: CPT

## 2025-09-05 PROCEDURE — 6370000000 HC RX 637 (ALT 250 FOR IP): Performed by: STUDENT IN AN ORGANIZED HEALTH CARE EDUCATION/TRAINING PROGRAM

## 2025-09-05 PROCEDURE — 99232 SBSQ HOSP IP/OBS MODERATE 35: CPT | Performed by: NURSE PRACTITIONER

## 2025-09-05 PROCEDURE — 2500000003 HC RX 250 WO HCPCS: Performed by: STUDENT IN AN ORGANIZED HEALTH CARE EDUCATION/TRAINING PROGRAM

## 2025-09-05 PROCEDURE — 6360000002 HC RX W HCPCS: Performed by: INTERNAL MEDICINE

## 2025-09-05 PROCEDURE — 80048 BASIC METABOLIC PNL TOTAL CA: CPT

## 2025-09-05 PROCEDURE — 6360000002 HC RX W HCPCS: Performed by: STUDENT IN AN ORGANIZED HEALTH CARE EDUCATION/TRAINING PROGRAM

## 2025-09-05 PROCEDURE — 99232 SBSQ HOSP IP/OBS MODERATE 35: CPT | Performed by: INTERNAL MEDICINE

## 2025-09-05 PROCEDURE — 36415 COLL VENOUS BLD VENIPUNCTURE: CPT

## 2025-09-05 PROCEDURE — 75710 ARTERY X-RAYS ARM/LEG: CPT

## 2025-09-05 PROCEDURE — 85025 COMPLETE CBC W/AUTO DIFF WBC: CPT

## 2025-09-05 PROCEDURE — 94640 AIRWAY INHALATION TREATMENT: CPT

## 2025-09-05 PROCEDURE — 94660 CPAP INITIATION&MGMT: CPT

## 2025-09-05 PROCEDURE — 76937 US GUIDE VASCULAR ACCESS: CPT

## 2025-09-05 PROCEDURE — 37215 TRANSCATH STENT CCA W/EPS: CPT

## 2025-09-05 PROCEDURE — 97535 SELF CARE MNGMENT TRAINING: CPT

## 2025-09-05 PROCEDURE — 97130 THER IVNTJ EA ADDL 15 MIN: CPT

## 2025-09-05 PROCEDURE — 6360000004 HC RX CONTRAST MEDICATION: Performed by: STUDENT IN AN ORGANIZED HEALTH CARE EDUCATION/TRAINING PROGRAM

## 2025-09-05 PROCEDURE — 2060000000 HC ICU INTERMEDIATE R&B

## 2025-09-05 PROCEDURE — 2580000003 HC RX 258: Performed by: STUDENT IN AN ORGANIZED HEALTH CARE EDUCATION/TRAINING PROGRAM

## 2025-09-05 PROCEDURE — 99153 MOD SED SAME PHYS/QHP EA: CPT | Performed by: STUDENT IN AN ORGANIZED HEALTH CARE EDUCATION/TRAINING PROGRAM

## 2025-09-05 RX ORDER — HYDRALAZINE HYDROCHLORIDE 20 MG/ML
INJECTION INTRAMUSCULAR; INTRAVENOUS PRN
Status: COMPLETED | OUTPATIENT
Start: 2025-09-05 | End: 2025-09-05

## 2025-09-05 RX ORDER — FENTANYL CITRATE 50 UG/ML
INJECTION, SOLUTION INTRAMUSCULAR; INTRAVENOUS PRN
Status: COMPLETED | OUTPATIENT
Start: 2025-09-05 | End: 2025-09-05

## 2025-09-05 RX ORDER — IOPAMIDOL 612 MG/ML
INJECTION, SOLUTION INTRAVASCULAR PRN
Status: COMPLETED | OUTPATIENT
Start: 2025-09-05 | End: 2025-09-05

## 2025-09-05 RX ORDER — PREDNISONE 20 MG/1
30 TABLET ORAL DAILY
Status: DISCONTINUED | OUTPATIENT
Start: 2025-09-06 | End: 2025-09-06 | Stop reason: HOSPADM

## 2025-09-05 RX ORDER — SODIUM CHLORIDE 9 MG/ML
INJECTION, SOLUTION INTRAVENOUS CONTINUOUS
Status: ACTIVE | OUTPATIENT
Start: 2025-09-05 | End: 2025-09-06

## 2025-09-05 RX ORDER — LIDOCAINE HYDROCHLORIDE 20 MG/ML
INJECTION, SOLUTION INFILTRATION; PERINEURAL PRN
Status: COMPLETED | OUTPATIENT
Start: 2025-09-05 | End: 2025-09-05

## 2025-09-05 RX ORDER — NITROGLYCERIN 20 MG/100ML
INJECTION INTRAVENOUS PRN
Status: COMPLETED | OUTPATIENT
Start: 2025-09-05 | End: 2025-09-05

## 2025-09-05 RX ORDER — MIDAZOLAM HYDROCHLORIDE 1 MG/ML
INJECTION, SOLUTION INTRAMUSCULAR; INTRAVENOUS PRN
Status: COMPLETED | OUTPATIENT
Start: 2025-09-05 | End: 2025-09-05

## 2025-09-05 RX ORDER — VERAPAMIL HYDROCHLORIDE 2.5 MG/ML
INJECTION INTRAVENOUS PRN
Status: COMPLETED | OUTPATIENT
Start: 2025-09-05 | End: 2025-09-05

## 2025-09-05 RX ORDER — GLYCOPYRROLATE 0.2 MG/ML
INJECTION INTRAMUSCULAR; INTRAVENOUS PRN
Status: COMPLETED | OUTPATIENT
Start: 2025-09-05 | End: 2025-09-05

## 2025-09-05 RX ORDER — HEPARIN SODIUM 10000 [USP'U]/ML
INJECTION, SOLUTION INTRAVENOUS; SUBCUTANEOUS PRN
Status: COMPLETED | OUTPATIENT
Start: 2025-09-05 | End: 2025-09-05

## 2025-09-05 RX ORDER — HEPARIN SODIUM 1000 [USP'U]/ML
INJECTION, SOLUTION INTRAVENOUS; SUBCUTANEOUS PRN
Status: COMPLETED | OUTPATIENT
Start: 2025-09-05 | End: 2025-09-05

## 2025-09-05 RX ORDER — ATROPINE SULFATE 0.1 MG/ML
INJECTION INTRAVENOUS PRN
Status: COMPLETED | OUTPATIENT
Start: 2025-09-05 | End: 2025-09-05

## 2025-09-05 RX ADMIN — DOXYCYCLINE HYCLATE 100 MG: 100 CAPSULE ORAL at 21:20

## 2025-09-05 RX ADMIN — ARFORMOTEROL TARTRATE 15 MCG: 15 SOLUTION RESPIRATORY (INHALATION) at 07:51

## 2025-09-05 RX ADMIN — Medication 1000 ML: at 18:29

## 2025-09-05 RX ADMIN — Medication 5000 UNITS: at 18:29

## 2025-09-05 RX ADMIN — Medication 1000 ML: at 18:28

## 2025-09-05 RX ADMIN — DOXYCYCLINE HYCLATE 100 MG: 100 CAPSULE ORAL at 07:44

## 2025-09-05 RX ADMIN — HEPARIN SODIUM 500 UNITS: 1000 INJECTION, SOLUTION INTRAVENOUS; SUBCUTANEOUS at 19:34

## 2025-09-05 RX ADMIN — MIDODRINE HYDROCHLORIDE 2.5 MG: 2.5 TABLET ORAL at 07:44

## 2025-09-05 RX ADMIN — VERAPAMIL HYDROCHLORIDE 2.5 MG: 2.5 INJECTION INTRAVENOUS at 18:38

## 2025-09-05 RX ADMIN — HYDRALAZINE HYDROCHLORIDE 10 MG: 20 INJECTION INTRAMUSCULAR; INTRAVENOUS at 18:20

## 2025-09-05 RX ADMIN — IOPAMIDOL 75 ML: 612 INJECTION, SOLUTION INTRAVENOUS at 20:25

## 2025-09-05 RX ADMIN — PREDNISONE 30 MG: 20 TABLET ORAL at 07:44

## 2025-09-05 RX ADMIN — ATORVASTATIN CALCIUM 40 MG: 40 TABLET, FILM COATED ORAL at 21:20

## 2025-09-05 RX ADMIN — SODIUM CHLORIDE: 0.9 INJECTION, SOLUTION INTRAVENOUS at 21:25

## 2025-09-05 RX ADMIN — HEPARIN SODIUM 4000 UNITS: 1000 INJECTION, SOLUTION INTRAVENOUS; SUBCUTANEOUS at 18:39

## 2025-09-05 RX ADMIN — GUAIFENESIN 400 MG: 400 TABLET ORAL at 14:18

## 2025-09-05 RX ADMIN — FENTANYL CITRATE 50 MCG: 50 INJECTION, SOLUTION INTRAMUSCULAR; INTRAVENOUS at 19:22

## 2025-09-05 RX ADMIN — NITROGLYCERIN 200 MCG: 20 INJECTION INTRAVENOUS at 18:38

## 2025-09-05 RX ADMIN — FENTANYL CITRATE 50 MCG: 50 INJECTION, SOLUTION INTRAMUSCULAR; INTRAVENOUS at 18:27

## 2025-09-05 RX ADMIN — SODIUM CHLORIDE, PRESERVATIVE FREE 10 ML: 5 INJECTION INTRAVENOUS at 07:48

## 2025-09-05 RX ADMIN — CLOPIDOGREL BISULFATE 75 MG: 75 TABLET, FILM COATED ORAL at 07:44

## 2025-09-05 RX ADMIN — SODIUM CHLORIDE, PRESERVATIVE FREE 10 ML: 5 INJECTION INTRAVENOUS at 21:26

## 2025-09-05 RX ADMIN — GABAPENTIN 300 MG: 300 CAPSULE ORAL at 07:44

## 2025-09-05 RX ADMIN — BUDESONIDE 500 MCG: 0.5 SUSPENSION RESPIRATORY (INHALATION) at 07:51

## 2025-09-05 RX ADMIN — IPRATROPIUM BROMIDE 0.5 MG: 0.5 SOLUTION RESPIRATORY (INHALATION) at 12:11

## 2025-09-05 RX ADMIN — CEFDINIR 300 MG: 300 CAPSULE ORAL at 07:45

## 2025-09-05 RX ADMIN — GABAPENTIN 300 MG: 300 CAPSULE ORAL at 21:20

## 2025-09-05 RX ADMIN — INSULIN LISPRO 1 UNITS: 100 INJECTION, SOLUTION INTRAVENOUS; SUBCUTANEOUS at 06:20

## 2025-09-05 RX ADMIN — SODIUM CHLORIDE: 9 INJECTION, SOLUTION INTRAVENOUS at 00:15

## 2025-09-05 RX ADMIN — MIDAZOLAM 1 MG: 1 INJECTION INTRAMUSCULAR; INTRAVENOUS at 19:23

## 2025-09-05 RX ADMIN — IPRATROPIUM BROMIDE 0.5 MG: 0.5 SOLUTION RESPIRATORY (INHALATION) at 04:12

## 2025-09-05 RX ADMIN — GABAPENTIN 300 MG: 300 CAPSULE ORAL at 14:18

## 2025-09-05 RX ADMIN — GUAIFENESIN 400 MG: 400 TABLET ORAL at 21:20

## 2025-09-05 RX ADMIN — IPRATROPIUM BROMIDE 0.5 MG: 0.5 SOLUTION RESPIRATORY (INHALATION) at 07:51

## 2025-09-05 RX ADMIN — MIDAZOLAM 1 MG: 1 INJECTION INTRAMUSCULAR; INTRAVENOUS at 18:27

## 2025-09-05 RX ADMIN — HEPARIN SODIUM 1000 UNITS: 1000 INJECTION, SOLUTION INTRAVENOUS; SUBCUTANEOUS at 20:05

## 2025-09-05 RX ADMIN — LIDOCAINE HYDROCHLORIDE 3 ML: 20 INJECTION, SOLUTION INFILTRATION; PERINEURAL at 18:31

## 2025-09-05 RX ADMIN — GLYCOPYRROLATE 0.4 MG: 0.2 INJECTION, SOLUTION INTRAMUSCULAR; INTRAVENOUS at 19:41

## 2025-09-05 RX ADMIN — ASPIRIN 81 MG: 81 TABLET, COATED ORAL at 07:44

## 2025-09-05 RX ADMIN — GUAIFENESIN 400 MG: 400 TABLET ORAL at 07:45

## 2025-09-05 RX ADMIN — ATROPINE SULFATE 0.4 MG: 0.1 INJECTION, SOLUTION ENDOTRACHEAL; INTRAMUSCULAR; INTRAVENOUS; SUBCUTANEOUS at 19:40

## 2025-09-05 ASSESSMENT — PAIN SCALES - GENERAL
PAINLEVEL_OUTOF10: 0
PAINLEVEL_OUTOF10: 0

## 2025-09-06 ENCOUNTER — APPOINTMENT (OUTPATIENT)
Dept: GENERAL RADIOLOGY | Age: 70
End: 2025-09-06
Payer: COMMERCIAL

## 2025-09-06 VITALS
SYSTOLIC BLOOD PRESSURE: 162 MMHG | WEIGHT: 167.99 LBS | DIASTOLIC BLOOD PRESSURE: 119 MMHG | OXYGEN SATURATION: 99 % | BODY MASS INDEX: 27 KG/M2 | TEMPERATURE: 98.3 F | HEIGHT: 66 IN | RESPIRATION RATE: 19 BRPM | HEART RATE: 64 BPM

## 2025-09-06 LAB
ACTIVATED CLOTTING TIME, LOW RANGE: 205 SEC
ACTIVATED CLOTTING TIME, LOW RANGE: 237 SEC
ANION GAP SERPL CALCULATED.3IONS-SCNC: 10 MMOL/L (ref 7–16)
ANION GAP SERPL CALCULATED.3IONS-SCNC: 12 MMOL/L (ref 7–16)
BASOPHILS # BLD: 0.01 K/UL (ref 0–0.2)
BASOPHILS NFR BLD: 0 % (ref 0–2)
BNP SERPL-MCNC: 3638 PG/ML (ref 0–125)
BUN SERPL-MCNC: 21 MG/DL (ref 8–23)
BUN SERPL-MCNC: 21 MG/DL (ref 8–23)
CALCIUM SERPL-MCNC: 8.4 MG/DL (ref 8.8–10.2)
CALCIUM SERPL-MCNC: 8.7 MG/DL (ref 8.8–10.2)
CHLORIDE SERPL-SCNC: 106 MMOL/L (ref 98–107)
CHLORIDE SERPL-SCNC: 106 MMOL/L (ref 98–107)
CO2 SERPL-SCNC: 23 MMOL/L (ref 22–29)
CO2 SERPL-SCNC: 27 MMOL/L (ref 22–29)
CREAT SERPL-MCNC: 0.8 MG/DL (ref 0.5–1)
CREAT SERPL-MCNC: 0.9 MG/DL (ref 0.5–1)
EOSINOPHIL # BLD: 0.01 K/UL (ref 0.05–0.5)
EOSINOPHILS RELATIVE PERCENT: 0 % (ref 0–6)
ERYTHROCYTE [DISTWIDTH] IN BLOOD BY AUTOMATED COUNT: 17.5 % (ref 11.5–15)
GFR, ESTIMATED: 73 ML/MIN/1.73M2
GFR, ESTIMATED: 80 ML/MIN/1.73M2
GLUCOSE BLD-MCNC: 94 MG/DL (ref 74–99)
GLUCOSE BLD-MCNC: 95 MG/DL (ref 74–99)
GLUCOSE SERPL-MCNC: 96 MG/DL (ref 74–99)
GLUCOSE SERPL-MCNC: 97 MG/DL (ref 74–99)
HCT VFR BLD AUTO: 44.8 % (ref 34–48)
HGB BLD-MCNC: 13.5 G/DL (ref 11.5–15.5)
IMM GRANULOCYTES # BLD AUTO: 0.05 K/UL (ref 0–0.58)
IMM GRANULOCYTES NFR BLD: 1 % (ref 0–5)
LYMPHOCYTES NFR BLD: 1.29 K/UL (ref 1.5–4)
LYMPHOCYTES RELATIVE PERCENT: 13 % (ref 20–42)
MAGNESIUM SERPL-MCNC: 1.7 MG/DL (ref 1.6–2.4)
MAGNESIUM SERPL-MCNC: 1.8 MG/DL (ref 1.6–2.4)
MCH RBC QN AUTO: 27.2 PG (ref 26–35)
MCHC RBC AUTO-ENTMCNC: 30.1 G/DL (ref 32–34.5)
MCV RBC AUTO: 90.1 FL (ref 80–99.9)
MONOCYTES NFR BLD: 0.89 K/UL (ref 0.1–0.95)
MONOCYTES NFR BLD: 9 % (ref 2–12)
NEUTROPHILS NFR BLD: 78 % (ref 43–80)
NEUTS SEG NFR BLD: 7.78 K/UL (ref 1.8–7.3)
PLATELET # BLD AUTO: 216 K/UL (ref 130–450)
PMV BLD AUTO: 10.7 FL (ref 7–12)
POTASSIUM SERPL-SCNC: 3.3 MMOL/L (ref 3.5–5.1)
POTASSIUM SERPL-SCNC: 3.4 MMOL/L (ref 3.5–5.1)
RBC # BLD AUTO: 4.97 M/UL (ref 3.5–5.5)
SODIUM SERPL-SCNC: 141 MMOL/L (ref 136–145)
SODIUM SERPL-SCNC: 142 MMOL/L (ref 136–145)
WBC OTHER # BLD: 10 K/UL (ref 4.5–11.5)

## 2025-09-06 PROCEDURE — 83735 ASSAY OF MAGNESIUM: CPT

## 2025-09-06 PROCEDURE — 6370000000 HC RX 637 (ALT 250 FOR IP): Performed by: INTERNAL MEDICINE

## 2025-09-06 PROCEDURE — 94640 AIRWAY INHALATION TREATMENT: CPT

## 2025-09-06 PROCEDURE — 2500000003 HC RX 250 WO HCPCS: Performed by: INTERNAL MEDICINE

## 2025-09-06 PROCEDURE — 6360000002 HC RX W HCPCS: Performed by: INTERNAL MEDICINE

## 2025-09-06 PROCEDURE — 71045 X-RAY EXAM CHEST 1 VIEW: CPT

## 2025-09-06 PROCEDURE — 6360000002 HC RX W HCPCS: Performed by: STUDENT IN AN ORGANIZED HEALTH CARE EDUCATION/TRAINING PROGRAM

## 2025-09-06 PROCEDURE — 94660 CPAP INITIATION&MGMT: CPT

## 2025-09-06 PROCEDURE — 80048 BASIC METABOLIC PNL TOTAL CA: CPT

## 2025-09-06 PROCEDURE — 82962 GLUCOSE BLOOD TEST: CPT

## 2025-09-06 PROCEDURE — 83880 ASSAY OF NATRIURETIC PEPTIDE: CPT

## 2025-09-06 PROCEDURE — 6370000000 HC RX 637 (ALT 250 FOR IP): Performed by: STUDENT IN AN ORGANIZED HEALTH CARE EDUCATION/TRAINING PROGRAM

## 2025-09-06 PROCEDURE — 85025 COMPLETE CBC W/AUTO DIFF WBC: CPT

## 2025-09-06 PROCEDURE — 2700000000 HC OXYGEN THERAPY PER DAY

## 2025-09-06 PROCEDURE — 6370000000 HC RX 637 (ALT 250 FOR IP): Performed by: NURSE PRACTITIONER

## 2025-09-06 RX ORDER — DOXYCYCLINE 100 MG/1
100 CAPSULE ORAL EVERY 12 HOURS SCHEDULED
Qty: 8 CAPSULE | Refills: 0 | Status: SHIPPED | OUTPATIENT
Start: 2025-09-06 | End: 2025-09-10

## 2025-09-06 RX ORDER — CLOPIDOGREL BISULFATE 75 MG/1
75 TABLET ORAL DAILY
Qty: 90 TABLET | Refills: 0 | Status: SHIPPED | OUTPATIENT
Start: 2025-09-07

## 2025-09-06 RX ORDER — CEFDINIR 300 MG/1
300 CAPSULE ORAL EVERY 12 HOURS SCHEDULED
Qty: 9 CAPSULE | Refills: 0 | Status: SHIPPED | OUTPATIENT
Start: 2025-09-06 | End: 2025-09-11

## 2025-09-06 RX ORDER — ASPIRIN 81 MG/1
81 TABLET ORAL DAILY
Qty: 90 TABLET | Refills: 0 | Status: SHIPPED | OUTPATIENT
Start: 2025-09-07

## 2025-09-06 RX ADMIN — IPRATROPIUM BROMIDE 0.5 MG: 0.5 SOLUTION RESPIRATORY (INHALATION) at 04:03

## 2025-09-06 RX ADMIN — ASPIRIN 81 MG: 81 TABLET, COATED ORAL at 09:32

## 2025-09-06 RX ADMIN — BUDESONIDE 500 MCG: 0.5 SUSPENSION RESPIRATORY (INHALATION) at 08:32

## 2025-09-06 RX ADMIN — DOXYCYCLINE HYCLATE 100 MG: 100 CAPSULE ORAL at 09:30

## 2025-09-06 RX ADMIN — CEFDINIR 300 MG: 300 CAPSULE ORAL at 00:09

## 2025-09-06 RX ADMIN — GUAIFENESIN 400 MG: 400 TABLET ORAL at 13:18

## 2025-09-06 RX ADMIN — GABAPENTIN 300 MG: 300 CAPSULE ORAL at 13:18

## 2025-09-06 RX ADMIN — ARFORMOTEROL TARTRATE 15 MCG: 15 SOLUTION RESPIRATORY (INHALATION) at 08:32

## 2025-09-06 RX ADMIN — IPRATROPIUM BROMIDE 0.5 MG: 0.5 SOLUTION RESPIRATORY (INHALATION) at 08:32

## 2025-09-06 RX ADMIN — ENOXAPARIN SODIUM 40 MG: 100 INJECTION SUBCUTANEOUS at 09:33

## 2025-09-06 RX ADMIN — SODIUM CHLORIDE, PRESERVATIVE FREE 10 ML: 5 INJECTION INTRAVENOUS at 09:00

## 2025-09-06 RX ADMIN — CLOPIDOGREL BISULFATE 75 MG: 75 TABLET, FILM COATED ORAL at 09:32

## 2025-09-06 RX ADMIN — GUAIFENESIN 400 MG: 400 TABLET ORAL at 09:48

## 2025-09-06 RX ADMIN — ACETAMINOPHEN 650 MG: 325 TABLET ORAL at 04:36

## 2025-09-06 RX ADMIN — PREDNISONE 30 MG: 20 TABLET ORAL at 09:33

## 2025-09-06 RX ADMIN — IPRATROPIUM BROMIDE 0.5 MG: 0.5 SOLUTION RESPIRATORY (INHALATION) at 13:56

## 2025-09-06 RX ADMIN — POTASSIUM CHLORIDE 40 MEQ: 1500 TABLET, EXTENDED RELEASE ORAL at 09:32

## 2025-09-06 RX ADMIN — GABAPENTIN 300 MG: 300 CAPSULE ORAL at 09:30

## 2025-09-06 RX ADMIN — CEFDINIR 300 MG: 300 CAPSULE ORAL at 13:32

## 2025-09-06 ASSESSMENT — PAIN SCALES - GENERAL
PAINLEVEL_OUTOF10: 2
PAINLEVEL_OUTOF10: 9
PAINLEVEL_OUTOF10: 0
PAINLEVEL_OUTOF10: 0

## 2025-09-06 ASSESSMENT — PAIN DESCRIPTION - ORIENTATION: ORIENTATION: RIGHT

## 2025-09-06 ASSESSMENT — PAIN - FUNCTIONAL ASSESSMENT
PAIN_FUNCTIONAL_ASSESSMENT: 0-10
PAIN_FUNCTIONAL_ASSESSMENT: ACTIVITIES ARE NOT PREVENTED
PAIN_FUNCTIONAL_ASSESSMENT: 0-10

## 2025-09-06 ASSESSMENT — PAIN DESCRIPTION - LOCATION: LOCATION: HEAD

## 2025-09-07 LAB
MICROORGANISM SPEC CULT: NORMAL
SERVICE CMNT-IMP: NORMAL
SPECIMEN DESCRIPTION: NORMAL

## (undated) DEVICE — NEEDLE FLTR 18GA L1.5IN MEM THK5UM BLNT DISP

## (undated) DEVICE — 4-PORT MANIFOLD: Brand: NEPTUNE 2

## (undated) DEVICE — DRAPE,EXTREMITY,89X128,STERILE: Brand: MEDLINE

## (undated) DEVICE — TOWEL,OR,DSP,ST,BLUE,STD,6/PK,12PK/CS: Brand: MEDLINE

## (undated) DEVICE — GLOVE ORTHO 7   MSG9470

## (undated) DEVICE — DOUBLE BASIN SET: Brand: MEDLINE INDUSTRIES, INC.

## (undated) DEVICE — STRIP,CLOSURE,WOUND,MEDI-STRIP,1/2X4: Brand: MEDLINE

## (undated) DEVICE — SYRINGE 20ML LL S/C 50

## (undated) DEVICE — HANDPIECE SET WITH COAXIAL HIGH FLOW TIP AND SUCTION TUBE: Brand: INTERPULSE

## (undated) DEVICE — KIT SURG W7XL11IN 2 PKT UNTREATED NA

## (undated) DEVICE — BNDG,ELSTC,MATRIX,STRL,3"X5YD,LF,HOOK&LP: Brand: MEDLINE

## (undated) DEVICE — 1000 S-DRAPE TOWEL DRAPE 10/BX: Brand: STERI-DRAPE™

## (undated) DEVICE — CONVERTORS STOCKINETTE: Brand: CONVERTORS

## (undated) DEVICE — GLOVE SURG SZ 65 L12IN FNGR THK79MIL GRN LTX FREE

## (undated) DEVICE — GOWN,SIRUS,POLYRNF,BRTHSLV,XL,30/CS: Brand: MEDLINE

## (undated) DEVICE — SYRINGE MED 30ML STD CLR PLAS LUERLOCK TIP N CTRL DISP

## (undated) DEVICE — SOLUTION IV 1000ML 0.9% SOD CHL PH 5 INJ USP VIAFLX PLAS

## (undated) DEVICE — BLADE,STAINLESS-STEEL,15,STRL,DISPOSABLE: Brand: MEDLINE

## (undated) DEVICE — DRAPE,REIN 53X77,STERILE: Brand: MEDLINE

## (undated) DEVICE — DRAPE,TOP,102X53,STERILE: Brand: MEDLINE

## (undated) DEVICE — GLOVE ORTHO 8   MSG9480

## (undated) DEVICE — PACK PROCEDURE SURG GEN CUST

## (undated) DEVICE — GAUZE,SPONGE,4"X4",8PLY,STRL,LF,10/TRAY: Brand: MEDLINE

## (undated) DEVICE — ZIMMER® STERILE DISPOSABLE TOURNIQUET CUFF WITH PLC, DUAL PORT, SINGLE BLADDER, 18 IN. (46 CM)

## (undated) DEVICE — MARKER,SKIN,WI/RULER AND LABELS: Brand: MEDLINE

## (undated) DEVICE — BASIC SINGLE BASIN 1-LF: Brand: MEDLINE INDUSTRIES, INC.

## (undated) DEVICE — Device

## (undated) DEVICE — BANDAGE,GAUZE,CONFORMING,4"X75",STRL,LF: Brand: MEDLINE

## (undated) DEVICE — GLOVE SURG SZ 65 THK91MIL LTX FREE SYN POLYISOPRENE

## (undated) DEVICE — RECIPROCATING BLADE HEAVY DUTY, OFFSET  (77.5 X 1.23 X 11.0MM)

## (undated) DEVICE — BLADE ES L6IN ELASTOMERIC COAT EXT DURABLE BEND UPTO 90DEG

## (undated) DEVICE — BLADE,STAINLESS-STEEL,10,STRL,DISPOSABLE: Brand: MEDLINE

## (undated) DEVICE — NEEDLE HYPO 25GA L1.5IN BLU POLYPR HUB S STL REG BVL STR

## (undated) DEVICE — NEEDLE HYPO 23GA L1.5IN TURQ POLYPR HUB S STL THN WALL IM

## (undated) DEVICE — BANDAGE,GAUZE,4.5"X4.1YD,STERILE,LF: Brand: MEDLINE

## (undated) DEVICE — TUBING, SUCTION, 9/32" X 10', STRAIGHT: Brand: MEDLINE

## (undated) DEVICE — APPLICATOR MEDICATED 26 CC SOLUTION HI LT ORNG CHLORAPREP

## (undated) DEVICE — SOLUTION IRRIG 3000ML 0.9% SOD CHL USP UROMATIC PLAS CONT

## (undated) DEVICE — 3M™ IOBAN™ 2 ANTIMICROBIAL INCISE DRAPE 6650EZ: Brand: IOBAN™ 2

## (undated) DEVICE — GLOVE SURG SZ 8 CRM LTX FREE POLYISOPRENE POLYMER BEAD ANTI

## (undated) DEVICE — BANDAGE,GAUZE,CONFORMING,3"X75",STRL,LF: Brand: MEDLINE

## (undated) DEVICE — COVER HNDL LT DISP

## (undated) DEVICE — C-ARM: Brand: UNBRANDED

## (undated) DEVICE — BANDAGE COMPR W4INXL10YD WHITE/BEIGE E MTRX HK LOOP CLSR

## (undated) DEVICE — DRESSING,GAUZE,XEROFORM,CURAD,1"X8",ST: Brand: CURAD

## (undated) DEVICE — 3M™ STERI-DRAPE™ U-DRAPE 1015: Brand: STERI-DRAPE™

## (undated) DEVICE — TUBING SUCT 12FR MAL ALUM SHFT FN CAP VENT UNIV CONN W/ OBT

## (undated) DEVICE — SINGLE USE DEVICE INTENDED TO COVER EXPOSED ENDS OF ORTHOPEDIC PIN AND K-WIRES TO HELP PROTECT THE EXPOSED WIRE FROM SNAGGING ON CLOTHING.: Brand: OXBORO™ PIN COVER

## (undated) DEVICE — DRAPE CARM MINI FOR IMAG SYS INSIGHT FLROSCN

## (undated) DEVICE — BNDG,ELSTC,MATRIX,STRL,4"X5YD,LF,HOOK&LP: Brand: MEDLINE

## (undated) DEVICE — DRESSING,GAUZE,PETROLATUM,CURAD,1"X8",ST: Brand: CURAD

## (undated) DEVICE — CHLORAPREP 26ML ORANGE

## (undated) DEVICE — GOWN,SIRUS,FABRNF,XL,20/CS: Brand: MEDLINE

## (undated) DEVICE — INTENDED FOR TISSUE SEPARATION, AND OTHER PROCEDURES THAT REQUIRE A SHARP SURGICAL BLADE TO PUNCTURE OR CUT.: Brand: BARD-PARKER ® STAINLESS STEEL BLADES

## (undated) DEVICE — SPONGE LAP W18XL18IN WHT COT 4 PLY FLD STRUNG RADPQ DISP ST 2 PER PACK

## (undated) DEVICE — PILLOW POS W15XH6XL22IN RASPBERRY FOAM ABD W/ STRP DISP FOR

## (undated) DEVICE — ELECTRODE PT RET AD L9FT HI MOIST COND ADH HYDRGEL CORDED

## (undated) DEVICE — STERILE PVP: Brand: MEDLINE INDUSTRIES, INC.

## (undated) DEVICE — K WIRE FIX L6IN DIA0.045IN 1600645] MICROAIRE SURGICAL INSTRUMENTS INC]: Type: IMPLANTABLE DEVICE | Site: FOOT | Status: NON-FUNCTIONAL

## (undated) DEVICE — SOLUTION IV IRRIG POUR BRL 0.9% SODIUM CHL 2F7124

## (undated) DEVICE — BLADE CLIPPER GEN PURP NS

## (undated) DEVICE — LOWER EXT KNEE DRAPE: Brand: MEDLINE INDUSTRIES, INC.

## (undated) DEVICE — NEEDLE,22GX1.5",REG,BEVEL: Brand: MEDLINE

## (undated) DEVICE — DRESSING HYDROFIBER AQUACEL AG ADVANTAGE 3.5X10 IN

## (undated) DEVICE — BANDAGE,GAUZE,BULKEE II,4.5"X4.1YD,STRL: Brand: MEDLINE

## (undated) DEVICE — BONE PREPARATION KIT: Brand: BIOPREP

## (undated) DEVICE — GLOVE ORANGE PI 8   MSG9080

## (undated) DEVICE — TRAP,MUCUS SPECIMEN,40CC: Brand: MEDLINE

## (undated) DEVICE — SHOE CAST HK  LOOP CLOSURE LG 11-13 IN M BLK PROCARE